# Patient Record
Sex: FEMALE | Race: WHITE | NOT HISPANIC OR LATINO | Employment: FULL TIME | URBAN - METROPOLITAN AREA
[De-identification: names, ages, dates, MRNs, and addresses within clinical notes are randomized per-mention and may not be internally consistent; named-entity substitution may affect disease eponyms.]

---

## 2017-07-05 ENCOUNTER — ALLSCRIPTS OFFICE VISIT (OUTPATIENT)
Dept: OTHER | Facility: OTHER | Age: 22
End: 2017-07-05

## 2017-07-05 DIAGNOSIS — Z34.81 ENCOUNTER FOR SUPERVISION OF OTHER NORMAL PREGNANCY, FIRST TRIMESTER: ICD-10-CM

## 2017-08-02 ENCOUNTER — ALLSCRIPTS OFFICE VISIT (OUTPATIENT)
Dept: OTHER | Facility: OTHER | Age: 22
End: 2017-08-02

## 2017-08-04 ENCOUNTER — GENERIC CONVERSION - ENCOUNTER (OUTPATIENT)
Dept: OTHER | Facility: OTHER | Age: 22
End: 2017-08-04

## 2017-08-04 ENCOUNTER — ALLSCRIPTS OFFICE VISIT (OUTPATIENT)
Dept: PERINATAL CARE | Facility: CLINIC | Age: 22
End: 2017-08-04
Payer: COMMERCIAL

## 2017-08-04 LAB
CHLAMYDIA, LIQUID-BASED (HISTORICAL): NOT DETECTED
GC BY MOL. METHOD (HISTORICAL): NOT DETECTED
PAP (HISTORICAL): NORMAL

## 2017-08-04 PROCEDURE — 76813 OB US NUCHAL MEAS 1 GEST: CPT | Performed by: OBSTETRICS & GYNECOLOGY

## 2017-08-09 ENCOUNTER — LAB CONVERSION - ENCOUNTER (OUTPATIENT)
Dept: OTHER | Facility: OTHER | Age: 22
End: 2017-08-09

## 2017-08-09 LAB
AGE RISK DOWN SYNDROME (HISTORICAL): NORMAL
CALC'D GESTATIONAL AGE (HISTORICAL): 13.4
COLLECTION DATE (HISTORICAL): NORMAL
CROWN RUMP LENGTH (HISTORICAL): 76 MM
CROWN RUMP LENGTH (HISTORICAL): NORMAL MM
DATE OF BIRTH (HISTORICAL): NORMAL
DONOR AGE; EGG RETRIEVAL (HISTORICAL): NORMAL
DONOR EGG (HISTORICAL): NO
EDD DETERMINED BY (HISTORICAL): NORMAL
ESTIMATED DELIVERY DATE (EDD) (HISTORICAL): NORMAL
HCG MOM (HISTORICAL): 0.45
HCG QUANTITATIVE (HISTORICAL): 35.4 IU/ML
HX OF NEURAL TUBE DEFECTS (HISTORICAL): NO
IF TWINS (HISTORICAL): NORMAL
INSULIN DEP. DIABETIC (HISTORICAL): NO
INTERPRETATION (HISTORICAL): NORMAL
MATERNAL WEIGHT (HISTORICAL): 146 LBS
MSS DOWN SYNDROME RISK (HISTORICAL): NORMAL
MSS3 TRISOMY 18 RISK (HISTORICAL): NORMAL
NASAL BONE (HISTORICAL): NORMAL
NASAL BONE (HISTORICAL): PRESENT
NT MOM (HISTORICAL): 0.96
NTQR LOCATION ID (HISTORICAL): NORMAL
NTQR READING PHYS ID (HISTORICAL): NORMAL
NUCHAL TRANSLUCENCY (HISTORICAL): 1.6 MM
NUCHAL TRANSLUCENCY (HISTORICAL): NORMAL MM
NUMBER OF FETUSES (HISTORICAL): 1
PAPP-A (HISTORICAL): 0.84
PAPP-A (HISTORICAL): 998.5 NG/ML
PREV PREGNANCY DOWN SYND (HISTORICAL): NO
RACE/ETHNIC ORIGIN (HISTORICAL): NORMAL
REFERRING PHYSICIAN (HISTORICAL): NORMAL
REFERRING PHYSICIAN NPI (HISTORICAL): NORMAL
REFERRING PHYSICIAN PHONE (HISTORICAL): NORMAL
REPEAT SPECIMEN (HISTORICAL): NO
ULTRASONOGRAPHER ID (HISTORICAL): NORMAL
ULTRASOUND DATE (HISTORICAL): NORMAL

## 2017-08-10 ENCOUNTER — GENERIC CONVERSION - ENCOUNTER (OUTPATIENT)
Dept: OTHER | Facility: OTHER | Age: 22
End: 2017-08-10

## 2017-08-16 ENCOUNTER — GENERIC CONVERSION - ENCOUNTER (OUTPATIENT)
Dept: OTHER | Facility: OTHER | Age: 22
End: 2017-08-16

## 2017-08-30 ENCOUNTER — LAB CONVERSION - ENCOUNTER (OUTPATIENT)
Dept: OTHER | Facility: OTHER | Age: 22
End: 2017-08-30

## 2017-08-30 LAB
AFP (HISTORICAL): 1.16
AFP (HISTORICAL): 40.8 NG/ML
AGE RISK DOWN SYNDROME (HISTORICAL): NORMAL
CALC'D GESTATIONAL AGE (HISTORICAL): 16.4
COLLECTION DATE (HISTORICAL): NORMAL
CROWN RUMP LENGTH (HISTORICAL): 76 MM
DATE OF BIRTH (HISTORICAL): NORMAL
ESTIMATED DELIVERY DATE (EDD) (HISTORICAL): NORMAL
ESTRADIOL, FREE (HISTORICAL): 0.75 NG/ML
ESTRIOL MOM (HISTORICAL): 0.86
HCG MOM (HISTORICAL): 0.3
HCG QUANTITATIVE (HISTORICAL): 9.5 IU/ML
HX OF NEURAL TUBE DEFECTS (HISTORICAL): NO
INHIBIN A (HISTORICAL): 147 PG/ML
INHIBIN A MOM (HISTORICAL): 0.86
INSULIN DEP. DIABETIC (HISTORICAL): NO
INTERPRETATION (HISTORICAL): NORMAL
MATERNAL WEIGHT (HISTORICAL): 146 LBS
MSAFP RISK OPEN NTD (HISTORICAL): NORMAL
MSS DOWN SYNDROME RISK (HISTORICAL): NORMAL
MSS3 TRISOMY 18 RISK (HISTORICAL): NORMAL
NASAL BONE (HISTORICAL): NORMAL
NASAL BONE (HISTORICAL): PRESENT
NT MOM (HISTORICAL): 0.96
NUCHAL TRANSLUCENCY (HISTORICAL): 1.6 MM
NUMBER OF FETUSES (HISTORICAL): 1
PAPP-A (HISTORICAL): 0.84
PAPP-A (HISTORICAL): 998.5 NG/ML
RACE/ETHNIC ORIGIN (HISTORICAL): NORMAL
REFERRING PHYSICIAN (HISTORICAL): NORMAL
REFERRING PHYSICIAN NPI (HISTORICAL): NORMAL
REFERRING PHYSICIAN PHONE (HISTORICAL): NORMAL
REPEAT SPECIMEN (HISTORICAL): NO
SPECIMEN: (HISTORICAL): NORMAL
ULTRASOUND DATE (HISTORICAL): NORMAL

## 2017-08-31 ENCOUNTER — GENERIC CONVERSION - ENCOUNTER (OUTPATIENT)
Dept: OTHER | Facility: OTHER | Age: 22
End: 2017-08-31

## 2017-09-05 ENCOUNTER — GENERIC CONVERSION - ENCOUNTER (OUTPATIENT)
Dept: OTHER | Facility: OTHER | Age: 22
End: 2017-09-05

## 2017-09-18 ENCOUNTER — ALLSCRIPTS OFFICE VISIT (OUTPATIENT)
Dept: OTHER | Facility: OTHER | Age: 22
End: 2017-09-18

## 2017-09-20 ENCOUNTER — ALLSCRIPTS OFFICE VISIT (OUTPATIENT)
Dept: PERINATAL CARE | Facility: CLINIC | Age: 22
End: 2017-09-20
Payer: COMMERCIAL

## 2017-09-20 ENCOUNTER — GENERIC CONVERSION - ENCOUNTER (OUTPATIENT)
Dept: OTHER | Facility: OTHER | Age: 22
End: 2017-09-20

## 2017-09-20 PROCEDURE — 76817 TRANSVAGINAL US OBSTETRIC: CPT | Performed by: OBSTETRICS & GYNECOLOGY

## 2017-09-20 PROCEDURE — 76805 OB US >/= 14 WKS SNGL FETUS: CPT | Performed by: OBSTETRICS & GYNECOLOGY

## 2017-09-21 LAB — CULT RSLT GENITAL (HISTORICAL): ABNORMAL

## 2017-09-22 ENCOUNTER — HOSPITAL ENCOUNTER (OUTPATIENT)
Dept: RADIOLOGY | Age: 22
Discharge: HOME | End: 2017-09-22
Payer: COMMERCIAL

## 2017-09-22 ENCOUNTER — GENERIC CONVERSION - ENCOUNTER (OUTPATIENT)
Dept: OTHER | Facility: OTHER | Age: 22
End: 2017-09-22

## 2017-09-22 ENCOUNTER — ALLSCRIPTS OFFICE VISIT (OUTPATIENT)
Dept: OTHER | Facility: OTHER | Age: 22
End: 2017-09-22

## 2017-09-22 DIAGNOSIS — Z34.90 ENCOUNTER FOR SUPERVISION OF NORMAL PREGNANCY: ICD-10-CM

## 2017-09-22 DIAGNOSIS — R10.11 RIGHT UPPER QUADRANT PAIN: ICD-10-CM

## 2017-09-22 PROCEDURE — 76705 ECHO EXAM OF ABDOMEN: CPT

## 2017-09-23 LAB
A/G RATIO (HISTORICAL): 1.3 (CALC) (ref 1–2.5)
ALBUMIN SERPL BCP-MCNC: 3.8 G/DL (ref 3.6–5.1)
ALP SERPL-CCNC: 70 U/L (ref 33–115)
ALT SERPL W P-5'-P-CCNC: 15 U/L (ref 6–29)
AST SERPL W P-5'-P-CCNC: 15 U/L (ref 10–30)
BASOPHILS # BLD AUTO: 0.2 %
BASOPHILS # BLD AUTO: 18 CELLS/UL (ref 0–200)
BILIRUB SERPL-MCNC: 0.2 MG/DL (ref 0.2–1.2)
BUN SERPL-MCNC: 8 MG/DL (ref 7–25)
BUN/CREA RATIO (HISTORICAL): NORMAL (CALC) (ref 6–22)
CALCIUM SERPL-MCNC: 9.2 MG/DL (ref 8.6–10.2)
CHLORIDE SERPL-SCNC: 103 MMOL/L (ref 98–110)
CO2 SERPL-SCNC: 26 MMOL/L (ref 20–31)
CREAT SERPL-MCNC: 0.59 MG/DL (ref 0.5–1.1)
DEPRECATED RDW RBC AUTO: 12.6 % (ref 11–15)
EGFR AFRICAN AMERICAN (HISTORICAL): 149 ML/MIN/1.73M2
EGFR-AMERICAN CALC (HISTORICAL): 128 ML/MIN/1.73M2
EOSINOPHIL # BLD AUTO: 0.9 %
EOSINOPHIL # BLD AUTO: 83 CELLS/UL (ref 15–500)
GAMMA GLOBULIN (HISTORICAL): 2.9 G/DL (CALC) (ref 1.9–3.7)
GLUCOSE (HISTORICAL): 74 MG/DL (ref 65–99)
HCT VFR BLD AUTO: 34.8 % (ref 35–45)
HGB BLD-MCNC: 11.5 G/DL (ref 11.7–15.5)
LYMPHOCYTES # BLD AUTO: 1619 CELLS/UL (ref 850–3900)
LYMPHOCYTES # BLD AUTO: 17.6 %
MCH RBC QN AUTO: 30.8 PG (ref 27–33)
MCHC RBC AUTO-ENTMCNC: 33 G/DL (ref 32–36)
MCV RBC AUTO: 93.3 FL (ref 80–100)
MONOCYTES # BLD AUTO: 644 CELLS/UL (ref 200–950)
MONOCYTES (HISTORICAL): 7 %
NEUTROPHILS # BLD AUTO: 6836 CELLS/UL (ref 1500–7800)
NEUTROPHILS # BLD AUTO: 74.3 %
PLATELET # BLD AUTO: 194 THOUSAND/UL (ref 140–400)
PMV BLD AUTO: 11.7 FL (ref 7.5–12.5)
POTASSIUM SERPL-SCNC: 4.2 MMOL/L (ref 3.5–5.3)
RBC # BLD AUTO: 3.73 MILLION/UL (ref 3.8–5.1)
SODIUM SERPL-SCNC: 136 MMOL/L (ref 135–146)
TOTAL PROTEIN (HISTORICAL): 6.7 G/DL (ref 6.1–8.1)
WBC # BLD AUTO: 9.2 THOUSAND/UL (ref 3.8–10.8)

## 2017-09-29 ENCOUNTER — ALLSCRIPTS OFFICE VISIT (OUTPATIENT)
Dept: OTHER | Facility: OTHER | Age: 22
End: 2017-09-29

## 2017-09-29 LAB
GLUCOSE (HISTORICAL): NORMAL
HGB UR QL STRIP.AUTO: NORMAL
KETONES UR STRIP-MCNC: NORMAL MG/DL
LEUKOCYTE ESTERASE UR QL STRIP: NORMAL
NITRITE UR QL STRIP: NORMAL
PROT UR STRIP-MCNC: NORMAL MG/DL

## 2017-10-02 LAB — CULTURE RESULT (HISTORICAL): ABNORMAL

## 2017-10-16 ENCOUNTER — ALLSCRIPTS OFFICE VISIT (OUTPATIENT)
Dept: OTHER | Facility: OTHER | Age: 22
End: 2017-10-16

## 2017-10-18 LAB
BACTERIA UR QL AUTO: ABNORMAL
BILIRUB UR QL STRIP: NEGATIVE
CHARACTER, URINE (HISTORICAL): ABNORMAL
COLOR UR: YELLOW
CRYSTAL TYPE (HISTORICAL): ABNORMAL PERHPF
CRYSTALS URNS QL MICRO: ABNORMAL
CULTURE RESULT (HISTORICAL): NO GROWTH
FINE GRAN CASTS URNS QL MICRO: ABNORMAL PERLPF (ref 0–1)
GLUCOSE UR STRIP-MCNC: NEGATIVE MG/DL
HGB UR QL STRIP.AUTO: NEGATIVE
HYALINE CASTS #/AREA URNS LPF: ABNORMAL PERLPF (ref 0–4)
KETONES UR STRIP-MCNC: NEGATIVE MG/DL
LEUKOCYTE ESTERASE UR QL STRIP: ABNORMAL
NITRITE UR QL STRIP: NEGATIVE
NON-SQ EPI CELLS URNS QL MICRO: ABNORMAL
PH UR STRIP.AUTO: 8 [PH] (ref 5–8)
PROT UR-MCNC: NEGATIVE G/DL
RBC CASTS URNS QL MICRO: ABNORMAL PERLPF (ref 0–1)
SP GR UR STRIP.AUTO: 1.02 (ref 1–1.03)
URINE RBC (HISTORICAL): ABNORMAL PERHPF
UROBILINOGEN UR QL STRIP.AUTO: 0.2 MG/DL (ref 0.2–1)
WBC #/AREA URNS AUTO: ABNORMAL PERHPF (ref 0–4)

## 2017-10-30 ENCOUNTER — GENERIC CONVERSION - ENCOUNTER (OUTPATIENT)
Dept: OTHER | Facility: OTHER | Age: 22
End: 2017-10-30

## 2017-10-30 DIAGNOSIS — Z34.90 ENCOUNTER FOR SUPERVISION OF NORMAL PREGNANCY: ICD-10-CM

## 2017-11-16 ENCOUNTER — TRANSCRIBE ORDERS (OUTPATIENT)
Dept: LAB | Facility: HOSPITAL | Age: 22
End: 2017-11-16

## 2017-11-16 ENCOUNTER — APPOINTMENT (OUTPATIENT)
Dept: LAB | Facility: HOSPITAL | Age: 22
End: 2017-11-16
Attending: OBSTETRICS & GYNECOLOGY
Payer: COMMERCIAL

## 2017-11-16 DIAGNOSIS — Z34.90 ENCOUNTER FOR SUPERVISION OF NORMAL PREGNANCY: ICD-10-CM

## 2017-11-16 LAB
BASOPHILS # BLD AUTO: 0.02 THOUSANDS/ΜL (ref 0–0.1)
BASOPHILS NFR BLD AUTO: 0 % (ref 0–1)
EOSINOPHIL # BLD AUTO: 0.06 THOUSAND/ΜL (ref 0–0.61)
EOSINOPHIL NFR BLD AUTO: 1 % (ref 0–6)
ERYTHROCYTE [DISTWIDTH] IN BLOOD BY AUTOMATED COUNT: 13.7 % (ref 11.6–15.1)
GLUCOSE 1H P 50 G GLC PO SERPL-MCNC: 84 MG/DL
HCT VFR BLD AUTO: 33.8 % (ref 34.8–46.1)
HGB BLD-MCNC: 11.3 G/DL (ref 11.5–15.4)
LYMPHOCYTES # BLD AUTO: 1.37 THOUSANDS/ΜL (ref 0.6–4.47)
LYMPHOCYTES NFR BLD AUTO: 16 % (ref 14–44)
MCH RBC QN AUTO: 31.6 PG (ref 26.8–34.3)
MCHC RBC AUTO-ENTMCNC: 33.4 G/DL (ref 31.4–37.4)
MCV RBC AUTO: 94 FL (ref 82–98)
MONOCYTES # BLD AUTO: 0.61 THOUSAND/ΜL (ref 0.17–1.22)
MONOCYTES NFR BLD AUTO: 7 % (ref 4–12)
NEUTROPHILS # BLD AUTO: 6.75 THOUSANDS/ΜL (ref 1.85–7.62)
NEUTS SEG NFR BLD AUTO: 76 % (ref 43–75)
NRBC BLD AUTO-RTO: 0 /100 WBCS
PLATELET # BLD AUTO: 147 THOUSANDS/UL (ref 149–390)
PMV BLD AUTO: 10.1 FL (ref 8.9–12.7)
RBC # BLD AUTO: 3.58 MILLION/UL (ref 3.81–5.12)
WBC # BLD AUTO: 8.86 THOUSAND/UL (ref 4.31–10.16)

## 2017-11-16 PROCEDURE — 82950 GLUCOSE TEST: CPT

## 2017-11-16 PROCEDURE — 36415 COLL VENOUS BLD VENIPUNCTURE: CPT

## 2017-11-16 PROCEDURE — 85025 COMPLETE CBC W/AUTO DIFF WBC: CPT

## 2017-11-24 ENCOUNTER — GENERIC CONVERSION - ENCOUNTER (OUTPATIENT)
Dept: OBGYN CLINIC | Facility: CLINIC | Age: 22
End: 2017-11-24

## 2017-12-14 ENCOUNTER — APPOINTMENT (OUTPATIENT)
Dept: PERINATAL CARE | Facility: CLINIC | Age: 22
End: 2017-12-14
Payer: COMMERCIAL

## 2017-12-14 ENCOUNTER — GENERIC CONVERSION - ENCOUNTER (OUTPATIENT)
Dept: OTHER | Facility: OTHER | Age: 22
End: 2017-12-14

## 2017-12-14 PROCEDURE — G0008 ADMIN INFLUENZA VIRUS VAC: HCPCS | Performed by: OBSTETRICS & GYNECOLOGY

## 2017-12-14 PROCEDURE — 76816 OB US FOLLOW-UP PER FETUS: CPT | Performed by: OBSTETRICS & GYNECOLOGY

## 2017-12-14 PROCEDURE — 90686 IIV4 VACC NO PRSV 0.5 ML IM: CPT

## 2018-01-02 ENCOUNTER — ALLSCRIPTS OFFICE VISIT (OUTPATIENT)
Dept: OTHER | Facility: OTHER | Age: 23
End: 2018-01-02

## 2018-01-02 LAB — EXTERNAL GROUP B STREP ANTIGEN: NEGATIVE

## 2018-01-05 LAB — CULTURE GENITAL-BSB ON (HISTORICAL): NEGATIVE

## 2018-01-10 ENCOUNTER — GENERIC CONVERSION - ENCOUNTER (OUTPATIENT)
Dept: OTHER | Facility: OTHER | Age: 23
End: 2018-01-10

## 2018-01-10 NOTE — PROGRESS NOTES
SEP 20 2017         RE: Justin Randhawa                             To: [facility]   MR#: [id number]                                    [address]   : APR Dózsa Marcia  48 , [facility]   ENC: [phone]                             Fax: [phone]   (Exam #: [id number])      The LMP of this 23year old,  G3, P1-0-1-1 patient was 2017, her   working YUE is 2018 and the current gestational age is 25 weeks 0   days by 1st Trimester Sono  A sonographic examination was performed on SEP   20 2017 using real time equipment  The ultrasound examination was   performed using abdominal & vaginal techniques  The patient has a BMI of   26 1  Her blood pressure today was 108/59  Earliest ultrasound found in her record: 17  9wks  18 YUE      Elizabeth Barrett has no complaints today  She reports fetal movements and denies   vaginal bleeding  Her recently completed Stepwise Sequential Screen   revealed Down syndrome trisomy 18, and ONTD risks each of less than   1:5,000  Evaluation of the individual analyte levels reveals no increased   risk for abnormal placental function        Cardiac motion was observed at 138 bpm       INDICATIONS      fetal anatomical survey      Exam Types      Transvaginal   LEVEL II      RESULTS      Fetus # 1 of 1   Vertex presentation   Fetal growth appeared normal   Placenta Location = Posterior   No placenta previa   Placenta Grade = I      MEASUREMENTS (* Included In Average GA)      AC              14 6 cm        19 weeks 4 days* (43%)   BPD              4 4 cm        19 weeks 2 days* (34%)   HC              17 7 cm        20 weeks 1 day * (51%)   Femur            3 3 cm        20 weeks 4 days* (51%)      NBL              5 8 mm      Humerus          3 0 cm        19 weeks 6 days  (50%)      Biorbit          3 1 cm        20 weeks 1 day      HC/AC           1 22   FL/AC           0 23   FL/BPD          0 75   EFW (Ac/Fl/Hc)   332 grams - 0 lbs 12 oz      THE AVERAGE GESTATIONAL AGE is 19 weeks 6 days +/- 10 days  AMNIOTIC FLUID         Largest Vertical Pocket = 3 7 cm   Amniotic Fluid: Normal      CERVICAL EVALUATION      The cervix appeared normal (Ultrasound Examination)  SUPINE      Cervical Length: 3 90 cm      OTHER TEST RESULTS           Funneling?: No             Dynamic Changes?: No        Resp  To TFP?: No                      Debris?: No      ANATOMY      Head                                    Normal   Face/Neck                               Normal   Th  Cav  Normal   Heart                                   Normal   Abd  Cav  Normal   Stomach                                 Normal   Right Kidney                            Normal   Left Kidney                             Normal   Bladder                                 Normal   Abd  Wall                               Normal   Spine                                   Normal   Extrems                                 Normal   Genitalia                               Normal   Placenta                                Normal   Umbl  Cord                              Normal   Uterus                                  Normal   PCI                                     Normal      ANATOMY DETAILS      Visualized Appearing Sonographically Normal:   HEAD: (Calvarium, BPD Level, Cavum, Lateral Ventricles, Choroid Plexus,   Cerebellum, Cisterna Magna);    FACE/NECK: (Neck, Nuchal Fold, Profile,   Orbits, Nose/Lips, Palate, Face);    TH  CAV  : (Lungs, Diaphragm); HEART: (Four Chamber View, Proximal Left Outflow, Proximal Right Outflow,   3VV, 3 Vessel Trachea, Short Axis of Greater Vessels, Ductal Arch, Aortic   Arch, Interventricular Septum, Interatrial Septum, IVC, SVC, Cardiac Axis,   Cardiac Position);    ABD  CAV : (Liver);    STOMACH, RIGHT KIDNEY, LEFT   KIDNEY, BLADDER, ABD   WALL, SPINE: (Cervical Spine, Thoracic Spine, Lumbar   Spine, Sacrum);    EXTREMS: (Lt Humerus, Rt Humerus, Lt Forearm, Rt   Forearm, Lt Hand, Rt Hand, Lt Femur, Rt Femur, Lt Low Leg, Rt Low Leg, Lt   Foot, Rt Foot);    GENITALIA (Female), PLACENTA, UMBL  CORD, UTERUS, PCI      ADNEXA      The left ovary appeared normal and measured 2 7 x 2 1 x 1 1 cm with a   volume of 3 3 cc  The right ovary appeared normal and measured 2 9 x 1 6 x   1 9 cm with a volume of 4 6 cc  IMPRESSION      Cruz IUP   19 weeks and 6 days by this ultrasound  (YUE=FEB 8 2018)   20 weeks and 0 days by 1st Tri Sono  (YUE=FEB 7 2018)   Vertex presentation   Fetal growth appeared normal   Normal anatomy survey   Regular fetal heart rate of 138 bpm   Posterior placenta   No placenta previa      GENERAL COMMENT      No fetal structural abnormality or ultrasound marker for aneuploidy is   identified on the Level II ultrasound study today  Fetal growth and   amniotic fluid volume are normal   The placenta is normal in appearance  The cervix is normal in appearance by transvaginal sonography  The   cervical length is normal   Cervical debris is not present  Cervical   funneling is not present  Neither provocative nor dynamic change is   appreciated  Today's ultrasound findings and suggested follow-up were discussed in   detail with [facility]  We discussed that prenatal ultrasound cannot rule out   all congenital abnormalities  Her Stepwise Sequential Screen results were   discussed in detail  No further appointment has been scheduled in the   [address] [place] [address] at this time  Follow-up [facility] ultrasound   evaluation is recommended as clinically indicated  The face to face time, in addition to time spent discussing ultrasound   results, was approximately 10 minutes, greater than 50% of which was spent   during counseling and coordination of care  JANIE Quinones M D     Maternal-Fetal Medicine   Electronically signed 09/20/17 12:52

## 2018-01-12 NOTE — RESULT NOTES
Verified Results  (Q) STEPWISE, PART 2 [id number] 02:37PM Matilde Trejo     Test Name Result Flag Reference   INTERPRETATION SEE NOTE     SCREEN NEGATIVE FOR OPEN NTD, DOWN SYNDROME AND TRISOMY 18   NT WAS USED IN THE RISK CALCULATIONS  RISK FOR ONTD <1:5000     AGE RISK DOWN SYNDROME 1:1100     GABRIELA DOWN SYNDROME RISK <1:5000  <1:270   RISK FOR TRISOMY 18 <1:5000  <1:100   CALCULATED GESTATIONAL$AGE 16 4     Crown rump length (CRL) was used to calculate gestational  age  YUE, if provided, was not used for gestational age  dating  AFP, SERUM 40 8 ng/mL     AFP MOM 1 16     Reference Range:                                        <2 50                                        IDD        <1 90                                        TWINS      <4 00                                        TWINS IDD  <3 50                                        TRIPLETS   <4 50   HCG, SERUM 9 5 IU/mL     HCG MOM 0 30     ESTRIOL, FREE 0 75 ng/mL     ESTRIOL MOM 0 86     INHIBIN A, DIMERIC 147 pg/mL     INHIBIN A MOM 0 86     JODI A 998 5 ng/mL     This test was performed using a kit that has not been  cleared or approved by the FDA  The analytical performance  characteristics of this test have been determined by [facility]  This test  should not be used for diagnosis without confirmation by  other medically established means  JODI-A MOM 0 84     NT MOM 0 96     The maternal serum screening results indicate a lower risk  of trisomy 21 in this pregnancy  The nasal bone was assessed  via ultrasound and was present  The combined risk is  therefore likely to be less than the calculated risk  Other  findings later in the pregnancy may change the risk  Nasal bone assessment is best accomplished through a fetal  ultrasound performed between 11 weeks 0 days through 13  weeks 6 days   In assessing the risk for aneuploidy, the  evaluation of the maternal serum markers plus the nuchal  thickness measurement is calculated first  Any potential  change to the patient's risk for aneuploidy depends on the  nuchal thickness, crown-rump length, and the ethnic origin,  and therefore the values generated by the algorithm itself  will not change  Additional information about the assessment  of the fetal nasal bone may be found on the [facility] website at  http://www  fetalmedicine com/fmf/training-certification/cert  jfpbknue-gf-wlnta  tence/11-13-week-scan/assessment-of-the-nasal-bone/  The Sequential Integrated Screen combines JODI-A and hCG  with or without a nuchal translucency measurement in the  first trimester with AFP, unconjugated estriol, intact hCG  and Inhibin A in the second trimester  This provides a  useful screening test for detection of open neural tube  defects, Down syndrome and Trisomy 18  It should be noted  that normal results can never guarantee the birth of a  normal baby and that 2 to 3 percent of newborns have some  type of physical or mental defect, many of which are  undetectable through any known prenatal diagnostic  technique  This is a screening test, not a diagnostic test      This risk assessment is based on demographic data provided  by the ordering physician  Please notify the laboratory  promptly if any data are incorrect  If you have questions concerning this report: For clinical consultation, call [phone]; For technical questions, call [phone] ext [phone]; For recalculations, fax to [phone]     REFERRING PHYSICIAN NAME [address] PHYSICIAN PHONE [phone]     REFERRING PHYSICIAN JOE [phone]     SPECIMEN # FROM PART 1 [id number]     DATE OF BIRTH 1995     COLLECTION DATE 08/25/2017     YUE: 02/07/2018     NUCHAL TRANSLUCENCY 1 6 mm     MOTHER'S ETHNIC ORIGIN      INSULIN DEPEND DIABETIC NO     REPEAT SPECIMEN NO     NUMBER OF FETUSES 1     HX OF NEURAL TUBE DEFECTS NO     MATERNAL WEIGHT 146 lbs     Crown Rump Length 76 mm     Ultrasound Date 08/04/2017     Nasal Bone PRESENT     Twin B Nasal Bone NOT GIVEN     For additional information, please refer to  http://Fiiiling/faq/FAQ74  (This link is provided for more informational/educational  purposes only )

## 2018-01-13 VITALS
DIASTOLIC BLOOD PRESSURE: 68 MMHG | HEIGHT: 64 IN | BODY MASS INDEX: 26.29 KG/M2 | SYSTOLIC BLOOD PRESSURE: 102 MMHG | WEIGHT: 154 LBS

## 2018-01-13 NOTE — PROGRESS NOTES
AUG 4 2017         RE: Marvin Salvage                             To: [facility]   MR#: [id number]                                    [address]   :  [facility], [address] [place] [address]   ENC: [id number]                             Fax: [phone]   (Exam #: [id number])      The LMP of this 23year old,  G3, P1-0-1-1 patient was 2017, her   working YUE is 2018 and the current gestational age is 17 weeks 2   days by 1st Trimester Sono  A sonographic examination was performed on AUG   4 2017 using real time equipment  The ultrasound examination was performed   using abdominal technique  The patient has a BMI of 25 1  Her blood   pressure today was 106/61  Earliest ultrasound found in her record: 17  9wks  18 YUE Multiple   longitudinal and transverse sections revealed a davis intrauterine   pregnancy  The placenta is posterior in implantation, grade 0 in   appearance  Cardiac motion was observed at 143 bpm       INDICATIONS      first trimester genetic screening      Exam Types      sequential screen      RESULTS      Fetus # 1 of 1   Fetal growth appeared normal      MEASUREMENTS (* Included In Average GA)      CRL              7 6 cm        13 weeks 3 days*   Nuchal Trans    1 60 mm      THE AVERAGE GESTATIONAL AGE is 13 weeks 3 days +/- 7 days  ANATOMY COMMENTS      Anatomic detail is limited at this gestational age  The yolk sac was not   noted  The fetal cranium appeared normal in shape and the nuchal   translucency was normal in size (1 6mm)  The nasal bone appears to be   present  The intracranial anatomy was unremarkable  Evaluation of the   spine revealed no obvious evidence for a neural tube defect  Anatomy of   the fetal thorax appeared within normal limits  The cardiac rhythm was   regular  Within the abdomen, stomach & bladder were visualized and the   abdominal wall appeared intact   A three vessel cord appears to be present  Active movement of the fetal body & extremities was seen  There is no   suspicion of a subchorionic bleed  The placental cord insertion was   normal    There is no suspicion of a uterine myoma  Free fluid is not seen   in the posterior cul-de-sac  ADNEXA      The left ovary appeared normal and measured 2 8 x 2 7 x 1 1 cm with a   volume of 4 3 cc  The right ovary appeared normal and measured 3 0 x 2 7 x   1 5 cm with a volume of 6 4 cc  AMNIOTIC FLUID         Largest Vertical Pocket = 3 1 cm   Amniotic Fluid: Normal      IMPRESSION      Cruz IUP   13 weeks and 3 days by this ultrasound  (YUE=FEB 6 2018)   13 weeks and 2 days by 1st Tri Sono  (YUE=FEB 7 2018)   Fetal growth appeared normal   Fetal heart rate of 143 bpm   Posterior placenta      CONSULT COMMENT      Thank you very much for requesting consultation on this very nice patient   for the indication of genetic screening  This is the patient's third   pregnancy  She has a history of one termination of pregnancy and one   full-term vaginal delivery without complications in 2694  She has no   other significant medical or surgical history  She denies the current use   of tobacco, alcohol, or drugs  She currently takes vitamins and has no   significant drug allergies  Her family medical history is significant for   a maternal aunt and cousin with colon cancer and breast cancer  No other   family members appear to have significant congenital birth defects  A   review of systems is otherwise negative  On exam, the patient appears   well, in no acute distress, and her abdomen is nontender  We discussed the options for genetic screening, including but not limited   to first trimester screening, second trimester screening, combined first   and second trimester screening, noninvasive prenatal testing (NIPT) for   patients at high risk and diagnostic screening through the use of CVS and   amniocentesis  We discussed the risks and benefits of each approach   including the sensitivities and false positive rates as well as the   difference between a screening test and a diagnostic test   At the   conclusion of our discussion the patient elected Stepwise Sequential   Screening to delineate her risk for fetal aneuploidy  She was given a   requisition to go to [facility] to have the first trimester blood work drawn  The first trimester portion of the screening results, encompassing age,   nuchal translucency, and biochemistry should be available within one week   of testing and will be reported from [facility]   The second stage of   sequential screening should be completed between the 15th and 21st week of   pregnancy (ideally between 16-18 weeks)  We discussed follow-up in detail and I recommend an anatomy ultrasound be   scheduled for 20 weeks gestation  Thank you very much for allowing us to participate in the care of this   very nice patient  Should you have any questions, please do not hesitate   to contact our office  Please note, in addition to the time spent discussing the results of the   ultrasound, I spent approximately 15 minutes of face-to-face time with the   patient, greater than 50% of which was spent in counseling and the   coordination of care for this patient  Portions of the record may have been created with voice recognition   software  Occasional wrong word or "sound a like" substitutions may have   occurred due to the inherent limitations of voice recognition software  Read the chart carefully and recognize, using context, where substitutions   have occurred  JANIE Rhoades M D     Electronically signed 08/04/17 09:30

## 2018-01-14 VITALS
DIASTOLIC BLOOD PRESSURE: 60 MMHG | BODY MASS INDEX: 25.87 KG/M2 | HEIGHT: 63 IN | SYSTOLIC BLOOD PRESSURE: 102 MMHG | WEIGHT: 146 LBS

## 2018-01-14 VITALS
SYSTOLIC BLOOD PRESSURE: 108 MMHG | BODY MASS INDEX: 25.95 KG/M2 | HEIGHT: 64 IN | DIASTOLIC BLOOD PRESSURE: 59 MMHG | WEIGHT: 152 LBS

## 2018-01-14 VITALS — DIASTOLIC BLOOD PRESSURE: 70 MMHG | BODY MASS INDEX: 27.1 KG/M2 | WEIGHT: 153 LBS | SYSTOLIC BLOOD PRESSURE: 120 MMHG

## 2018-01-14 VITALS
HEIGHT: 64 IN | BODY MASS INDEX: 24.96 KG/M2 | SYSTOLIC BLOOD PRESSURE: 106 MMHG | DIASTOLIC BLOOD PRESSURE: 61 MMHG | WEIGHT: 146.2 LBS

## 2018-01-15 NOTE — MISCELLANEOUS
Message  Demetrius Dc notified part 1 stepwise results are within normal limits   instructions given for part 2 blood draw   TRF mailed      Active Problems    1  Anxiety (300 00) (F41 9)   2  Encounter for supervision of other normal pregnancy in first trimester (V22 1) (Z34 81)   3  Finger injury (959 5) (S69 90XA)   4  Need for vaccination for DTP (V06 1) (Z23)   5  Normal pregnancy (V22 2) (Z34 90)    Current Meds   1  Select-OB+DHA 29-1 & 250 MG Oral Miscellaneous; 1 tablet daily; Therapy: 65Hyd1810 to (Evaluate:41Dwv4278)  Requested for: 07Tqd8723; Last   Rx:[id number] Ordered   2  Vitamin D3 19839 UNIT Oral Capsule; Therapy: (Recorded:59Sle8986) to Recorded    Allergies    1  No Known Drug Allergies    2  No Known Environmental Allergies   3   No Known Food Allergies    Signatures   Electronically signed by : Naima Silver, ; Aug 16 2017  9:01AM EST                       (Author)

## 2018-01-16 NOTE — RESULT NOTES
Verified Results  (Q) STEPWISE, PART 1 [id number] 10:50AM Yashira Johnson     Test Name Result Flag Reference   INTERPRETATION SEE NOTE     This patient's risk does not exceed the first trimester  cut-off for Down syndrome or trisomy 18  The integrated  screen calculation is awaiting the second trimester sample  NT WAS USED IN THE RISK CALCULATIONS  Thank you for submitting this patient's Part 1 specimen  These first trimester values will be incorporated with the  second trimester values as part of the integrated testing  process  Please submit the Part 2 specimen between   08/15/2017-10/09/2017 (15 0 and 22 9 weeks gestation) with   08/15/2017-08/28/2017 (15 0 - 16 9 weeks gestation) being  optimal  When submitting Part 2, please include the  following Specimen # from Part 1:  [id number]   AGE RISK DOWN SYNDROME 1:780     GABRIELA DOWN SYNDROME RISK <1:5000  <1:50   RISK FOR TRISOMY 18 <1:5000  <1:100   CALCULATED GESTATIONAL$AGE 13 4     Crown rump length (CRL) was used to calculate gestational  age  YUE, if provided, was not used for gestational age  dating  JODI-A 998 5 ng/mL     This test was performed using a kit that has not been  cleared or approved by the FDA  The analytical performance  characteristics of this test have been determined by [facility]  This test  should not be used for diagnosis without confirmation by  other medically established means  JODI-A MOM 0 84     HCG 35 4 IU/mL     HCG MOM 0 45     NT MOM 0 96     The maternal serum screening results indicate a lower risk  of trisomy 21 in this pregnancy  The nasal bone was assessed  via ultrasound and was present  The combined risk is  therefore likely to be less than the calculated risk  Other  findings later in the pregnancy may change the risk  Nasal bone assessment is best accomplished through a fetal  ultrasound performed between 11 weeks 0 days through 13  weeks 6 days   In assessing the risk for aneuploidy, the  evaluation of the maternal serum markers plus the nuchal  thickness measurement is calculated first  Any potential  change to the patient's risk for aneuploidy depends on the  nuchal thickness, crown-rump length, and the ethnic origin,  and therefore the values generated by the algorithm itself  will not change  Additional information about the assessment  of the fetal nasal bone may be found on the [facility] website at  http://www  fetalmedicine com/fmf/training-certification/cert  mdfeywzg-yz-udbqu  tence/11-13-week-scan/assessment-of-the-nasal-bone/  Please note that the Sequential Integrated maternal serum  screen for Down syndrome risk assessment was designed by Dr Jaxon Quevedo ([address], et al J Med Screen 2003 v10 p56-104)  to provide a high detection rate and low false positive rate  when a cutoff of 1:50 is used to identify high risk  pregnancies during the first trimester  Use of any other  cutoff for determination of risk in the first trimester will  result in a higher false positive rate for the two-part  screen  All patients whose risk is lower than 1:50 should  have a second sample submitted to complete the screen  This is a screening test, not a diagnostic test      This risk assessment is based on demographic data provided  by the ordering physician  Please notify the laboratory  promptly if any data are incorrect  If you have questions concerning this report: For clinical consultation, call [phone]; For technical questions, call [phone] ext [phone]; For recalculations, fax to [phone]  For additional information, please refer to  http://[employer]/faq/FAQ85  (This link is provided for informational/educational  purposes only )   REFERRING PHYSICIAN NAME [address] [place] [address] PHONE [phone] [address] [facility] [phone]     DATE OF BIRTH 1995     COLLECTION DATE 08/04/2017     ULTRASOUND DATE 08/04/2017     ULTRASONOGRAPHER'S NAME AKHIL YARBROUGH     NTQR ULTRASONOGRAPHER ID# [id number]     NTQR LOCATION ID# NOT GIVEN     NTQR READING PHYS ID# [id number]     [facility] ULTRASONOGRAPHER ID# NOT GIVEN     CROWN RUMP LENGTH 76 mm     NUCHAL TRANSLUCENCY 1 6 mm     IF TWINS NOT GIVEN     TWIN B CRL NOT GIVEN mm     TWIN B NT NOT GIVEN mm     MATERNAL WEIGHT 146 lbs     EST'D DATE OF DELIVERY 02/07/2018     YUE DETERMINED BY ULTRASOUND     MOTHER'S ETHNIC ORIGIN      NUMBER OF FETUSES 1     INSULIN DEPEND DIABETIC NO     REPEAT SPECIMEN NO     HX OF NEURAL TUBE DEFECTS NO     PREV PREG DOWN SYND NO     DONOR EGG NO     DONOR EGG; EGG RETRIEVAL NOT GIVEN     Nasal Bone PRESENT     Twin B Nasal Bone NOT GIVEN

## 2018-01-19 ENCOUNTER — GENERIC CONVERSION - ENCOUNTER (OUTPATIENT)
Dept: OTHER | Facility: OTHER | Age: 23
End: 2018-01-19

## 2018-01-20 ENCOUNTER — OB ABSTRACT (OUTPATIENT)
Dept: OBGYN CLINIC | Facility: CLINIC | Age: 23
End: 2018-01-20

## 2018-01-22 VITALS
HEIGHT: 63 IN | DIASTOLIC BLOOD PRESSURE: 62 MMHG | BODY MASS INDEX: 26.05 KG/M2 | WEIGHT: 147 LBS | SYSTOLIC BLOOD PRESSURE: 108 MMHG

## 2018-01-22 VITALS
SYSTOLIC BLOOD PRESSURE: 120 MMHG | WEIGHT: 161 LBS | BODY MASS INDEX: 27.49 KG/M2 | HEIGHT: 64 IN | DIASTOLIC BLOOD PRESSURE: 72 MMHG

## 2018-01-22 VITALS — DIASTOLIC BLOOD PRESSURE: 70 MMHG | BODY MASS INDEX: 26.95 KG/M2 | SYSTOLIC BLOOD PRESSURE: 120 MMHG | WEIGHT: 157 LBS

## 2018-01-22 VITALS — WEIGHT: 152 LBS | DIASTOLIC BLOOD PRESSURE: 66 MMHG | SYSTOLIC BLOOD PRESSURE: 110 MMHG | BODY MASS INDEX: 26.09 KG/M2

## 2018-01-22 VITALS
BODY MASS INDEX: 25.78 KG/M2 | WEIGHT: 151 LBS | SYSTOLIC BLOOD PRESSURE: 100 MMHG | DIASTOLIC BLOOD PRESSURE: 58 MMHG | HEIGHT: 64 IN

## 2018-01-22 VITALS
SYSTOLIC BLOOD PRESSURE: 108 MMHG | HEIGHT: 64 IN | DIASTOLIC BLOOD PRESSURE: 68 MMHG | WEIGHT: 162 LBS | BODY MASS INDEX: 27.66 KG/M2

## 2018-01-23 VITALS — SYSTOLIC BLOOD PRESSURE: 102 MMHG | BODY MASS INDEX: 27.64 KG/M2 | DIASTOLIC BLOOD PRESSURE: 60 MMHG | WEIGHT: 161 LBS

## 2018-01-23 NOTE — PROGRESS NOTES
DEC 14 2017         RE: Eder Mckeon                             To: [facility]   MR#: [id number]                                    [address]   : APR Dózsa Marcia  48 , [address] [facility]   ENC: [id number]                             Fax: [phone]   (Exam #: [id number])      The LMP of this 23year old,  G3, P1-0-1-1 patient was 2017, her   working YUE is 2018 and the current gestational age is 26 weeks 1   day by 1st Trimester Sono  A sonographic examination was performed on DEC   14 2017 using real time equipment  The ultrasound examination was   performed using abdominal technique  The patient has a BMI of 27 3  Her   blood pressure today was 108/60  Earliest ultrasound found in her record: 17  9wks  18 YUE      Cardiac motion was observed at 123 bpm       INDICATIONS      fetal growth      Exam Types      Level I      RESULTS      Fetus # 1 of 1   Vertex presentation   Fetal growth appeared normal   Placenta Location = Posterior   No placenta previa   Placenta Grade = II      MEASUREMENTS (* Included In Average GA)      AC              27 6 cm        31 weeks 5 days* (38%)   BPD              7 9 cm        31 weeks 6 days* (33%)   HC              28 5 cm        30 weeks 6 days* (9%)   Femur            6 0 cm        31 weeks 2 days* (33%)      Cerebellum       4 0 cm        33 weeks 5 days      HC/AC           1 03   FL/AC           0 22   FL/BPD          0 76   EFW (Ac/Fl/Hc)  1774 grams - 3 lbs 15 oz                 (32%)      THE AVERAGE GESTATIONAL AGE is 31 weeks 4 days +/- 21 days  AMNIOTIC FLUID      Q1: 2 5      Q2: 3 5      Q3: 4 4      Q4: 3 0   JANINE Total = 13 4 cm   Amniotic Fluid: Normal      ANATOMY COMMENTS      Fetal anatomy has been previously assessed and documented in our Center   and no anomalies were identified  No fetal structural abnormality is   identified on the Level I survey today   Follow up intracranial anatomy   (calvarium, cavum, lateral ventricles, and cerebellum), cardiac anatomy (,   LVOT, RVOT), diaphragm, stomach, kidneys, and bladder appear normal       IMPRESSION      Cruz IUP   31 weeks and 4 days by this ultrasound  (YUE=FEB 11 2018)   32 weeks and 1 day by 1st Tri Sono  (YUE=FEB 7 2018)   Vertex presentation   Fetal growth appeared normal   Regular fetal heart rate of 123 bpm   Posterior placenta   No placenta previa      GENERAL COMMENT      Ms Lunsford is here for growth  There is a single live intrauterine pregnancy with growth at the 32nd   percentile  No gross anomalies were identified on limited views  Amniotic fluid is within normal limits  Evaluation and Management:   The patient was counseled regarding the above findings  A total of 10   minutes were spent in this encounter with >50% of the time spent in   face-to-face counseling and in coordination of care  The limitations of   ultrasound were reviewed  She can continue pregnancy with expectant management without further   ultrasounds unless an additional indication or concern arises  I strongly recommended consideration of influenza vaccination in pregnancy   and this was administered prior to her departure today  At the conclusion of today's encounter, all questions were answered to her   satisfaction  Thank you very much for this kind referral and please do   not hesitate to contact me with any further questions or concerns  JANIE Ordaz M D     Maternal Fetal Medicine   Electronically signed 12/14/17 12:29

## 2018-01-24 VITALS
HEIGHT: 64 IN | WEIGHT: 164 LBS | SYSTOLIC BLOOD PRESSURE: 102 MMHG | BODY MASS INDEX: 28 KG/M2 | DIASTOLIC BLOOD PRESSURE: 64 MMHG

## 2018-01-24 VITALS
BODY MASS INDEX: 28.34 KG/M2 | WEIGHT: 166 LBS | HEIGHT: 64 IN | DIASTOLIC BLOOD PRESSURE: 62 MMHG | SYSTOLIC BLOOD PRESSURE: 110 MMHG

## 2018-01-24 VITALS
BODY MASS INDEX: 27.31 KG/M2 | DIASTOLIC BLOOD PRESSURE: 62 MMHG | SYSTOLIC BLOOD PRESSURE: 100 MMHG | WEIGHT: 160 LBS | HEIGHT: 64 IN

## 2018-01-24 VITALS
WEIGHT: 159 LBS | SYSTOLIC BLOOD PRESSURE: 108 MMHG | HEIGHT: 64 IN | DIASTOLIC BLOOD PRESSURE: 60 MMHG | BODY MASS INDEX: 27.14 KG/M2

## 2018-01-26 ENCOUNTER — ROUTINE PRENATAL (OUTPATIENT)
Dept: OBGYN CLINIC | Facility: CLINIC | Age: 23
End: 2018-01-26

## 2018-01-26 VITALS — BODY MASS INDEX: 28.32 KG/M2 | SYSTOLIC BLOOD PRESSURE: 118 MMHG | DIASTOLIC BLOOD PRESSURE: 64 MMHG | WEIGHT: 165 LBS

## 2018-01-26 DIAGNOSIS — Z3A.38 38 WEEKS GESTATION OF PREGNANCY: Primary | ICD-10-CM

## 2018-01-26 PROCEDURE — PNV: Performed by: OBSTETRICS & GYNECOLOGY

## 2018-01-26 RX ORDER — ASCORBIC ACID, CHOLECALCIFEROL, .ALPHA.-TOCOPHEROL ACETATE, DL-, PYRIDOXINE, FOLIC ACID, CYANOCOBALAMIN, CALCIUM, FERROUS FUMARATE, MAGNESIUM, DOCONEXENT 85; 200; 10; 25; 1; 12; 140; 27; 45; 300 [IU]/1; [IU]/1; [IU]/1; [IU]/1; MG/1; UG/1; MG/1; MG/1; MG/1; MG/1
1 CAPSULE, GELATIN COATED ORAL DAILY
Refills: 11 | COMMUNITY
Start: 2017-12-31 | End: 2019-04-02 | Stop reason: ALTCHOICE

## 2018-01-26 NOTE — PROGRESS NOTES
Patient reports mild nausea and cramping, some change in quality of movement but overall good movement good fm, no v, headache, bleeding, loss of fluid, edema, dom violence, or smoking    gaudencio pnv

## 2018-02-02 ENCOUNTER — ROUTINE PRENATAL (OUTPATIENT)
Dept: OBGYN CLINIC | Facility: CLINIC | Age: 23
End: 2018-02-02

## 2018-02-02 VITALS — BODY MASS INDEX: 28.67 KG/M2 | WEIGHT: 167 LBS | DIASTOLIC BLOOD PRESSURE: 68 MMHG | SYSTOLIC BLOOD PRESSURE: 100 MMHG

## 2018-02-02 DIAGNOSIS — Z3A.38 38 WEEKS GESTATION OF PREGNANCY: ICD-10-CM

## 2018-02-02 PROCEDURE — PNV: Performed by: OBSTETRICS & GYNECOLOGY

## 2018-02-08 ENCOUNTER — ROUTINE PRENATAL (OUTPATIENT)
Dept: OBGYN CLINIC | Facility: CLINIC | Age: 23
End: 2018-02-08

## 2018-02-08 VITALS — DIASTOLIC BLOOD PRESSURE: 74 MMHG | SYSTOLIC BLOOD PRESSURE: 116 MMHG | BODY MASS INDEX: 28.67 KG/M2 | WEIGHT: 167 LBS

## 2018-02-08 DIAGNOSIS — Z34.83 PRENATAL CARE, SUBSEQUENT PREGNANCY, THIRD TRIMESTER: Primary | ICD-10-CM

## 2018-02-08 PROBLEM — B37.31 YEAST VAGINITIS: Status: ACTIVE | Noted: 2017-09-18

## 2018-02-08 PROBLEM — B37.3 YEAST VAGINITIS: Status: ACTIVE | Noted: 2017-09-18

## 2018-02-08 PROCEDURE — PNV: Performed by: NURSE PRACTITIONER

## 2018-02-08 NOTE — PROGRESS NOTES
OFFICE VISIT: Pt reports mild cramping and headaches  Denies LOF, VB,  N/V, Edema, Domestic Violence, Smoking, + FM  Tolerating PNV  Appointment for IOL scheduled for Thursday Feb 15th at  [facility]  m , pt aware of date and time  Pt to report to [facility]  GBS negative  Reviewed labor precautions

## 2018-02-09 ENCOUNTER — TELEPHONE (OUTPATIENT)
Dept: OBGYN CLINIC | Facility: CLINIC | Age: 23
End: 2018-02-09

## 2018-02-10 ENCOUNTER — ANESTHESIA EVENT (INPATIENT)
Dept: LABOR AND DELIVERY | Facility: HOSPITAL | Age: 23
End: 2018-02-10
Payer: COMMERCIAL

## 2018-02-10 ENCOUNTER — ANESTHESIA (INPATIENT)
Dept: LABOR AND DELIVERY | Facility: HOSPITAL | Age: 23
End: 2018-02-10
Payer: COMMERCIAL

## 2018-02-10 ENCOUNTER — HOSPITAL ENCOUNTER (INPATIENT)
Facility: HOSPITAL | Age: 23
LOS: 2 days | Discharge: HOME | End: 2018-02-12
Attending: OBSTETRICS & GYNECOLOGY | Admitting: OBSTETRICS & GYNECOLOGY
Payer: COMMERCIAL

## 2018-02-10 DIAGNOSIS — Z3A.40 40 WEEKS GESTATION OF PREGNANCY: ICD-10-CM

## 2018-02-10 DIAGNOSIS — O47.1 FALSE LABOR AFTER 37 WEEKS OF GESTATION WITHOUT DELIVERY: ICD-10-CM

## 2018-02-10 LAB
ABO GROUP BLD: NORMAL
BASE EXCESS BLDCOA CALC-SCNC: -0.1 MMOL/L (ref 3–11)
BASE EXCESS BLDCOV CALC-SCNC: -1.2 MMOL/L (ref 1–9)
BASOPHILS # BLD AUTO: 0.01 THOUSANDS/ΜL (ref 0–0.1)
BASOPHILS NFR BLD AUTO: 0 % (ref 0–1)
BLD GP AB SCN SERPL QL: NEGATIVE
EOSINOPHIL # BLD AUTO: 0.07 THOUSAND/ΜL (ref 0–0.61)
EOSINOPHIL NFR BLD AUTO: 1 % (ref 0–6)
ERYTHROCYTE [DISTWIDTH] IN BLOOD BY AUTOMATED COUNT: 14.1 % (ref 11.6–15.1)
HCO3 BLDCOA-SCNC: 25.4 MMOL/L (ref 17.3–27.3)
HCO3 BLDCOV-SCNC: 23.9 MMOL/L (ref 12.2–28.6)
HCT VFR BLD AUTO: 35.9 % (ref 34.8–46.1)
HGB BLD-MCNC: 12.3 G/DL (ref 11.5–15.4)
LYMPHOCYTES # BLD AUTO: 1.85 THOUSANDS/ΜL (ref 0.6–4.47)
LYMPHOCYTES NFR BLD AUTO: 17 % (ref 14–44)
MCH RBC QN AUTO: 31.9 PG (ref 26.8–34.3)
MCHC RBC AUTO-ENTMCNC: 34.3 G/DL (ref 31.4–37.4)
MCV RBC AUTO: 93 FL (ref 82–98)
MONOCYTES # BLD AUTO: 0.69 THOUSAND/ΜL (ref 0.17–1.22)
MONOCYTES NFR BLD AUTO: 6 % (ref 4–12)
NEUTROPHILS # BLD AUTO: 8.37 THOUSANDS/ΜL (ref 1.85–7.62)
NEUTS SEG NFR BLD AUTO: 76 % (ref 43–75)
NRBC BLD AUTO-RTO: 0 /100 WBCS
O2 CT VFR BLDCOA CALC: 14.4 ML/DL
OXYHGB MFR BLDCOA: 58.7 %
OXYHGB MFR BLDCOV: 59.4 %
PCO2 BLDCOA: 44.3 MM[HG] (ref 30–60)
PCO2 BLDCOV: 41.5 MM HG (ref 27–43)
PH BLDCOA: 7.38 [PH] (ref 7.23–7.43)
PH BLDCOV: 7.38 [PH] (ref 7.19–7.49)
PLATELET # BLD AUTO: 125 THOUSANDS/UL (ref 149–390)
PMV BLD AUTO: 10.3 FL (ref 8.9–12.7)
PO2 BLDCOA: 23.7 MM HG (ref 5–25)
PO2 BLDCOV: 24.9 MM HG (ref 15–45)
RBC # BLD AUTO: 3.86 MILLION/UL (ref 3.81–5.12)
RH BLD: POSITIVE
SAO2 % BLDCOV: 14.3 ML/DL
SPECIMEN EXPIRATION DATE: NORMAL
WBC # BLD AUTO: 11.03 THOUSAND/UL (ref 4.31–10.16)

## 2018-02-10 PROCEDURE — 86900 BLOOD TYPING SEROLOGIC ABO: CPT | Performed by: OBSTETRICS & GYNECOLOGY

## 2018-02-10 PROCEDURE — 86592 SYPHILIS TEST NON-TREP QUAL: CPT | Performed by: OBSTETRICS & GYNECOLOGY

## 2018-02-10 PROCEDURE — 99204 OFFICE O/P NEW MOD 45 MIN: CPT

## 2018-02-10 PROCEDURE — 59400 OBSTETRICAL CARE: CPT | Performed by: OBSTETRICS & GYNECOLOGY

## 2018-02-10 PROCEDURE — 82805 BLOOD GASES W/O2 SATURATION: CPT | Performed by: OBSTETRICS & GYNECOLOGY

## 2018-02-10 PROCEDURE — 86850 RBC ANTIBODY SCREEN: CPT | Performed by: OBSTETRICS & GYNECOLOGY

## 2018-02-10 PROCEDURE — 86901 BLOOD TYPING SEROLOGIC RH(D): CPT | Performed by: OBSTETRICS & GYNECOLOGY

## 2018-02-10 PROCEDURE — G0463 HOSPITAL OUTPT CLINIC VISIT: HCPCS

## 2018-02-10 PROCEDURE — 85025 COMPLETE CBC W/AUTO DIFF WBC: CPT | Performed by: OBSTETRICS & GYNECOLOGY

## 2018-02-10 RX ORDER — DOCUSATE SODIUM 100 MG/1
100 CAPSULE, LIQUID FILLED ORAL 2 TIMES DAILY PRN
Status: DISCONTINUED | OUTPATIENT
Start: 2018-02-10 | End: 2018-02-12 | Stop reason: HOSPADM

## 2018-02-10 RX ORDER — BUPIVACAINE HYDROCHLORIDE 2.5 MG/ML
INJECTION, SOLUTION INFILTRATION; PERINEURAL AS NEEDED
Status: DISCONTINUED | OUTPATIENT
Start: 2018-02-10 | End: 2018-02-10 | Stop reason: SURG

## 2018-02-10 RX ORDER — ONDANSETRON 2 MG/ML
4 INJECTION INTRAMUSCULAR; INTRAVENOUS EVERY 6 HOURS PRN
Status: DISCONTINUED | OUTPATIENT
Start: 2018-02-10 | End: 2018-02-10

## 2018-02-10 RX ORDER — FENTANYL CITRATE 50 UG/ML
INJECTION, SOLUTION INTRAMUSCULAR; INTRAVENOUS
Status: COMPLETED
Start: 2018-02-10 | End: 2018-02-10

## 2018-02-10 RX ORDER — DIPHENHYDRAMINE HCL 25 MG
25 TABLET ORAL EVERY 6 HOURS PRN
Status: DISCONTINUED | OUTPATIENT
Start: 2018-02-10 | End: 2018-02-12 | Stop reason: HOSPADM

## 2018-02-10 RX ORDER — FENTANYL CITRATE 50 UG/ML
INJECTION, SOLUTION INTRAMUSCULAR; INTRAVENOUS AS NEEDED
Status: DISCONTINUED | OUTPATIENT
Start: 2018-02-10 | End: 2018-02-10 | Stop reason: SURG

## 2018-02-10 RX ORDER — SODIUM CHLORIDE, SODIUM LACTATE, POTASSIUM CHLORIDE, CALCIUM CHLORIDE 600; 310; 30; 20 MG/100ML; MG/100ML; MG/100ML; MG/100ML
125 INJECTION, SOLUTION INTRAVENOUS CONTINUOUS
Status: DISCONTINUED | OUTPATIENT
Start: 2018-02-10 | End: 2018-02-12 | Stop reason: HOSPADM

## 2018-02-10 RX ORDER — ACETAMINOPHEN 325 MG/1
650 TABLET ORAL EVERY 6 HOURS PRN
Status: DISCONTINUED | OUTPATIENT
Start: 2018-02-10 | End: 2018-02-12 | Stop reason: HOSPADM

## 2018-02-10 RX ORDER — DIAPER,BRIEF,INFANT-TODD,DISP
1 EACH MISCELLANEOUS AS NEEDED
Status: DISCONTINUED | OUTPATIENT
Start: 2018-02-10 | End: 2018-02-12 | Stop reason: HOSPADM

## 2018-02-10 RX ORDER — IBUPROFEN 600 MG/1
600 TABLET ORAL EVERY 6 HOURS PRN
Status: DISCONTINUED | OUTPATIENT
Start: 2018-02-10 | End: 2018-02-12 | Stop reason: HOSPADM

## 2018-02-10 RX ORDER — OXYCODONE HYDROCHLORIDE AND ACETAMINOPHEN 5; 325 MG/1; MG/1
1 TABLET ORAL EVERY 4 HOURS PRN
Status: DISCONTINUED | OUTPATIENT
Start: 2018-02-10 | End: 2018-02-12 | Stop reason: HOSPADM

## 2018-02-10 RX ORDER — OXYTOCIN/RINGER'S LACTATE 30/500 ML
PLASTIC BAG, INJECTION (ML) INTRAVENOUS
Status: COMPLETED
Start: 2018-02-10 | End: 2018-02-10

## 2018-02-10 RX ADMIN — SODIUM CHLORIDE, SODIUM LACTATE, POTASSIUM CHLORIDE, AND CALCIUM CHLORIDE 125 ML/HR: .6; .31; .03; .02 INJECTION, SOLUTION INTRAVENOUS at 08:57

## 2018-02-10 RX ADMIN — IBUPROFEN 600 MG: 600 TABLET ORAL at 23:06

## 2018-02-10 RX ADMIN — BUPIVACAINE HYDROCHLORIDE 1 ML: 2.5 INJECTION, SOLUTION INFILTRATION; PERINEURAL at 08:36

## 2018-02-10 RX ADMIN — Medication 30 UNITS: at 14:20

## 2018-02-10 RX ADMIN — SODIUM CHLORIDE, SODIUM LACTATE, POTASSIUM CHLORIDE, AND CALCIUM CHLORIDE 125 ML/HR: .6; .31; .03; .02 INJECTION, SOLUTION INTRAVENOUS at 08:10

## 2018-02-10 RX ADMIN — IBUPROFEN 600 MG: 600 TABLET ORAL at 17:26

## 2018-02-10 RX ADMIN — FENTANYL CITRATE 10 MCG: 50 INJECTION, SOLUTION INTRAMUSCULAR; INTRAVENOUS at 08:36

## 2018-02-10 RX ADMIN — ROPIVACAINE HYDROCHLORIDE: 2 INJECTION, SOLUTION EPIDURAL; INFILTRATION at 08:45

## 2018-02-10 NOTE — OB LABOR/OXYTOCIN SAFETY PROGRESS
Labor Progress Note - Milka Holland 23 y o  female MRN: [id number]    Unit/Bed#:  303-01 Encounter: [id number]    Obstetric History       T1      L1     SAB0   TAB0   Ectopic0   Multiple0   Live Births1      Gestational Age: 44w3d     Contraction Frequency (minutes): 2-3  Contraction Quality: Moderate  Tachysystole: No   Dilation: 6        Effacement (%): 90  Station: 0  Baseline Rate: 110 bpm  Fetal Heart Rate: 115 BPM  FHR Category: Category I      fetal head OA    Notes/comments:   Patient feeling itching  Mostly torso, sudden onset, no rash  Will switch to bands and observe  Expectant management            Betty Palma MD 2/10/2018 10:17 AM

## 2018-02-10 NOTE — DISCHARGE SUMMARY
Discharge Summary - Yadira Gracia 23 y o  female MRN: [id number]    Unit/Bed#: -01 Encounter: [id number]    Admission Date: 2/10/2018     Discharge Date: 2018    Admitting Diagnosis:   1  Pregnancy at 40w3d  2  SROM  3  Labor  4  Anxiety  5  External hemorrhoids    Discharge Diagnosis: same, delivered    Procedures: spontaneous vaginal delivery    Attending: Placido Miller MD    Hospital Course:     Yadira Gracia is a 23 y o   at 40w3d wks who was initially admitted for SROM and early labor  Pt presented to L&D for r/o labor initially, however, reported a gush of fluid at 0530  Pt was noted to have positive pooling, nitrazine and ferning  Initial exam 2-3/70/-3  Pt received an epidural for anesthesia  She was managed expectantly  Pt progressed to complete @1409 and pushing at 1414    She delivered a viable female  on 2/10/18 at 26  Weight 6lbs 10oz via spontaneous vaginal delivery  Apgars were 9 (1 min) and 9 (5 min)  Mom and baby recovered in room together  Her postpartum course was uncomplicated  Her postpartum pain was well controlled with oral analgesics  On day of discharge, she was ambulating and able to reasonably perform all ADLs  She was voiding and had appropriate bowel function  Pain was well controlled  She was discharged home on postpartum day #2 without complications  Patient was instructed to follow up with her OB as an outpatient and was given appropriate warnings to call provider if she develops signs of infection or uncontrolled pain  Complications: none apparent    Condition at discharge: good     Discharge instructions/Information to patient and family:   See after visit summary for information provided to patient and family  Provisions for Follow-Up Care:  See after visit summary for information related to follow-up care and any pertinent home health orders        Disposition: Home    Planned Readmission: Eloise Hernandez MD  2/12/2018

## 2018-02-10 NOTE — OB LABOR/OXYTOCIN SAFETY PROGRESS
Labor Progress Note - Adrián Dubose 23 y o  female MRN: [id number]    Unit/Bed#: -01 Encounter: [id number]    Obstetric History       T1      L1     SAB0   TAB0   Ectopic0   Multiple0   Live Births1      Gestational Age: 44w3d     Contraction Frequency (minutes): 2-4  Contraction Quality: Moderate  Tachysystole: No   Dilation: 8        Effacement (%): 90  Station: 0  Baseline Rate: 110 bpm  Fetal Heart Rate: 115 BPM  FHR Category: Category I          Notes/comments:      Patient comfortable  Expectant management         Mario Choudhury MD 2/10/2018 12:09 PM

## 2018-02-10 NOTE — ANESTHESIA PROCEDURE NOTES
CSE Block    Patient location during procedure: OB  Start time: 2/10/2018 8:30 AM  Reason for block: procedure for pain and at surgeon's request  Staffing  Anesthesiologist: Elisabet Chandler  Performed: anesthesiologist   Preanesthetic Checklist  Completed: patient identified, surgical consent, pre-op evaluation, timeout performed, IV checked, risks and benefits discussed and monitors and equipment checked  CSE  Patient position: sitting  Prep: Betadine and site prepped and draped  Patient monitoring: heart rate, continuous pulse ox and frequent blood pressure checks  Approach: midline  Spinal Needle  Needle type: pencil-tip   Needle gauge: 27 G  Needle length: 10 cm  Epidural Needle  Injection technique: MAGALIS saline  Needle type: Tuohy   Epidural needle gauge: 17 g  Location: lumbar (1-5) (L3/4)  Catheter  Catheter type: side hole  Catheter size: 19 g springwound  Catheter at skin depth: 11 cm  AssessmentInjection Assessment:  negative aspiration for heme, no pain on injection, no paresthesia on injection and positive aspiration for clear CSF  Additional Notes  Pt positioned sitting, timeout, sterile prep and drape  Placement 1 attempt/pass at L 3/4 with MAGALIS to NS  Needle through needle CSE with + CSF, easy injection, + relief  Cath threaded easily, negative aspiration  Patient laid supine with ISABELLA, PCEA initiated

## 2018-02-10 NOTE — OB LABOR/OXYTOCIN SAFETY PROGRESS
Labor Progress Note - Gretel Castanon 23 y o  female MRN: [id number]    Unit/Bed#: -01 Encounter: [id number]    Obstetric History       T1      L1     SAB0   TAB0   Ectopic0   Multiple0   Live Births1      Gestational Age: 44w3d     Contraction Frequency (minutes): 2-3  Contraction Quality: Moderate  Tachysystole: No   Dilation: 4-5        Effacement (%): 90  Station: -1  Baseline Rate: 105 bpm  Fetal Heart Rate: 126 BPM  FHR Category: Category I          Notes/comments:      Patient comfortable with epidural   Expectant management         Davi Rose MD 2/10/2018 9:28 AM

## 2018-02-10 NOTE — DISCHARGE INSTRUCTIONS
Vaginal Delivery   WHAT YOU SHOULD KNOW:   A vaginal delivery is the birth of your baby through your vagina (birth canal)  AFTER YOU LEAVE:   Medicines:  · NSAIDs  help decrease swelling and pain or fever  This medicine is available with or without a doctor's order  NSAIDs can cause stomach bleeding or kidney problems in certain people  If you take blood thinner medicine, always ask your healthcare provider if NSAIDs are safe for you  Always read the medicine label and follow directions  · Take your medicine as directed  Call your healthcare provider if you think your medicine is not helping or if you have side effects  Tell him if you are allergic to any medicine  Keep a list of the medicines, vitamins, and herbs you take  Include the amounts, and when and why you take them  Bring the list or the pill bottles to follow-up visits  Carry your medicine list with you in case of an emergency  Follow up with your primary healthcare provider:  Most women need to return 6 weeks after a vaginal delivery  Ask about how to care for your wounds or stitches  Write down your questions so you remember to ask them during your visits  Activity:  Rest as much as possible  Try to keep all activities short  You may be able to do some exercise soon after you have your baby  Talk with your primary healthcare provider before you start exercising  If you work outside the home, ask when you can return to your job  Kegel exercises:  Kegel exercises may help your vaginal and rectal muscles heal faster  You can do Kegel exercises by tightening and relaxing the muscles around your vagina  Kegel exercises help make the muscles stronger  Breast care:  When your milk comes in, your breasts may feel full and hard  Ask how to care for your breasts, even if you are not breastfeeding  Constipation:  Do not try to push the bowel movement out if it is too hard   High-fiber foods, extra liquids, and regular exercise can help you prevent constipation  Examples of high-fiber foods are fruit and bran  Prune juice and water are good liquids to drink  Regular exercise helps your digestive system work  You may also be told to take over-the-counter fiber and stool softener medicines  Take these items as directed  Hemorrhoids:  Pregnancy can cause severe hemorrhoids  You may have rectal pain because of the hemorrhoids  Ask how to prevent or treat hemorrhoids  Perineum care: Your perineum is the area between your vagina and anus  Keep the area clean and dry to help it heal and to prevent infection  Wash the area gently with soap and water when you bathe or shower  Rinse your perineum with warm water when you use the toilet  Your primary healthcare provider may suggest you use a warm sitz bath to help decrease pain  A sitz bath is a bathtub or basin filled to hip level  Stay in the sitz bath for 20 to 30 minutes, or as directed  Vaginal discharge: You will have vaginal discharge, called lochia, after your delivery  The lochia is bright red the first day or two after the birth  By the fourth day, the amount decreases, and it turns red-brown  Use a sanitary pad rather than a tampon to prevent a vaginal infection  It is normal to have lochia up to 8 weeks after your baby is born  Monthly periods: Your period may start again within 7 to 12 weeks after your baby is born  If you are breastfeeding, it may take longer for your period to start again  You can still get pregnant again even though you do not have your monthly period  Talk with your primary healthcare provider about a birth control method that will be good for you if you do not want to get pregnant  Mood changes: Many new mothers have some kind of mood changes after delivery  Some of these changes occur because of lack of sleep, hormone changes, and caring for a new baby  Some mood changes can be more serious, such as postpartum depression   Talk with your primary healthcare provider if you feel unable to care for yourself or your baby  Sexual activity:  You may need to avoid sex for 6 to 7 weeks after you have your baby  You may notice you have a decreased desire for sex, or sex may be painful  You may need to use a vaginal lubricant (gel) to help make sex more comfortable  Contact your primary healthcare provider if:   · You have heavy vaginal bleeding that fills 1 or more sanitary pads in 1 hour  · You have a fever  · Your pain does not go away, or gets worse  · The skin between your vagina and rectum is swollen, warm, or red  · You have swollen, hard, or painful breasts  · You feel very sad or depressed  · You feel more tired than usual      · You have questions or concerns about your condition or care  Seek care immediately or call 911 if:   · You have pus or yellow drainage coming from your vagina or wound  · You are urinating very little, or not at all  · Your arm or leg feels warm, tender, and painful  It may look swollen and red  · You feel lightheaded, have sudden and worsening chest pain, or trouble breathing  You may have more pain when you take deep breaths or cough, or you may cough up blood  © 2014 [address] is for End User's use only and may not be sold, redistributed or otherwise used for commercial purposes  All illustrations and images included in CareNotes® are the copyrighted property of [employer]  or [employer]  The above information is an  only  It is not intended as medical advice for individual conditions or treatments  Talk to your doctor, nurse or pharmacist before following any medical regimen to see if it is safe and effective for you

## 2018-02-10 NOTE — ANESTHESIA POSTPROCEDURE EVALUATION
Post-Op Assessment Note      CV Status:  Stable    Mental Status:  Alert and awake    Hydration Status:  Euvolemic    PONV Controlled:  Controlled    Airway Patency:  Patent    Post Op Vitals Reviewed: Yes          Staff: Anesthesiologist     Post-op block assessment: catheter intact and no complications        /52 (02/10/18 1445)    Temp      Pulse 86 (02/10/18 1445)   Resp      SpO2

## 2018-02-10 NOTE — PROGRESS NOTES
Triage Note - OB  Chichi Mayer 23 y o  female MRN: [id number]  Unit/Bed#: LD Triage 3 Encounter: [id number]    OB TRIAGE NOTE  Chichi Mayer  [id number]  2/10/2018  4:31 AM  LD Triage 3/ Triage 3-*    ASSESS:  23 y o   40w3d in early labor  PLAN  #1  Rule out labor:    0430:  2-3/70/-3  #2  SROM at 0530: admitted to L&D see H&P    D/w Dr Vito Lewis  ______________    SUBJECTIVE    YUE: Estimated Date of Delivery: 18    HPI Chronology:  23 y o  Amanuel Pardo presents with complaint of contractions that began around 0030  She reports them as moderate in intensity, every 3-6 minutes  Contractions:  Present, q 3-6 minutes  Leakage:  None  Bleeding:  None  Fetal Movement:  Present  Pelvic pain:  Present    Her pregnancy is complicated by anxiety and external hemorroids  Vitals:   /63 (BP Location: Right arm)   Pulse 87   Temp 97 7 °F (36 5 °C) (Oral)   Resp 20   Ht 5' 3" (1 6 m)   Wt 75 8 kg (167 lb)   LMP 2017 (Exact Date)   SpO2 97%   BMI 29 58 kg/m²   Body mass index is 29 58 kg/m²  Review of Systems   Constitutional: Negative  Respiratory: Negative  Cardiovascular: Negative  Gastrointestinal: Negative  Genitourinary: Positive for pelvic pain  Physical Exam   Constitutional: She is oriented to person, place, and time  She appears well-developed and well-nourished  Cardiovascular: Normal rate, regular rhythm and normal heart sounds  Exam reveals no gallop and no friction rub  No murmur heard  Pulmonary/Chest: Effort normal and breath sounds normal  No respiratory distress  She has no wheezes  She has no rales  Abdominal: Soft  Bowel sounds are normal    Gravid uterus   Neurological: She is alert and oriented to person, place, and time          Cervix: 2-3/70/-3    FHT:  Variability: Moderate 6-25 bpm  FHR Category: Category I   Baseline: 120  Accelerations: 15x15, episodic  Decelerations: absent    TOCO:   Irritability    Labs: No results found for this or any previous visit (from the past 24 hour(s))  Lab, Imaging and other studies: I have personally reviewed pertinent reports  Gale Bonilla MD  2/10/2018  4:31 AM        Addendum:    Pt was noted to be positive for SROM with SVE 3/70/-3  Admitted to L&D after discussion with Dr Loretta Treadwell to take coverage at [address] [place] [address], MD  OBKYLAHN, PGY-1  2/10/2018

## 2018-02-10 NOTE — L&D DELIVERY NOTE
Delivery Note    Obstetrician:   Brett Amezcua    Assistant: none    Pre-Delivery Diagnosis: Term pregnancy, Spontaneous labor or Single fetus    Post-Delivery Diagnosis: Same as above - Delivered    Procedure: SAVD     Episiotomy: none    Laceration: none    Estimated Blood Loss:  350 mL           Complications:  none    Cord Gas: venous 7 38 BE -1 2 Arterial 7 38 BE -0 1      Details: Patient delivered a viable Female  over intact perineum  After delivery of the  and appropriate delay, the umbilical cord was doubly clamped and cut and the  was passed to staff for routine care  Umbilical cord blood and umbilical artery and venous gases were collected  Active management of the third stage of labor was undertaken with IV pitocin  Bleeding was noted to be under control  Placenta delivered intact with 3-v cord and trailing membranes  Inspection of the perineum revealed intact perineum  Uterus was massaged and firming well and all showed excellant hemostasis  Mother and baby are currently recovering nicely in stable condition             Attending Attestation: I was present for the entire procedure

## 2018-02-10 NOTE — H&P
H&P Exam - Obstetrics   Avis Sanchez 23 y o  female MRN: [id number]  Unit/Bed#: LD Triage 3 Encounter: [id number]    >2 Midnights    INPATIENT     History of Present Illness   Chief Complaint: SROM    HPI:  Avis Sanchez is a 23 y o   female with an YUE of 2018, by Ultrasound at 40w3d weeks gestation who is being admitted for SROM, early labor  She presented initially for r/o labor and during that time, pt stated she felt a gush of fluid around 0530  After which, her contractions became stronger  Contractions: Frequency: Every 5 minutes  Leakage of fluid: onset: 0530 and color: clear  Bleeding: None  Fetal movement: present  Her current obstetrical history is significant for Anxiety and external hemorrhoids  Review of Systems   Constitutional: Negative for chills and fever  Eyes: Negative for visual disturbance  Respiratory: Negative for cough, shortness of breath and wheezing  Cardiovascular: Negative for chest pain and palpitations  Gastrointestinal: Negative for abdominal pain, constipation, diarrhea, nausea and vomiting  Genitourinary: Negative for vaginal bleeding and vaginal discharge  Neurological: Negative for weakness, light-headedness and headaches         Historical Information   OB History    Para Term  AB Living   3 1 1   1 1   SAB TAB Ectopic Multiple Live Births           1      # Outcome Date GA Lbr Jeff/2nd Weight Sex Delivery Anes PTL Lv   3 Current            2 Term 13 40w0d   M Vag-Spont EPI  JG   1 AB                 Baby complications/comments: EFW 7lbs by leopolds  Past Medical History:   Diagnosis Date    Anxiety     last assessed - [id number]    Chest pain     last assessed - [id number]    Continuous RUQ abdominal pain     last assessed - [id number]    External hemorrhoid     last assessed - [id number]    Finger injury     last assessed - [id number]    Varicella     childhood    Yeast vaginitis     last assessed - [id number] Past Surgical History:   Procedure Laterality Date    DENTAL SURGERY      INDUCED       WISDOM TOOTH EXTRACTION       Social History   History   Alcohol Use No     Comment: Social alcohol use     History   Drug Use No     History   Smoking Status    Former Smoker    Quit date: 2017   Smokeless Tobacco    Never Used     Family History: non-contributory    Meds/Allergies   {  Prescriptions Prior to Admission   Medication    Prenat w/o Y-LP-Nedpotw-FA-DHA (PNV-DHA) 27-0 6-0 4-300 MG CAPS     No Known Allergies    Objective   Vitals: Blood pressure 115/61, pulse 72, temperature 97 7 °F (36 5 °C), temperature source Oral, resp  rate 20, height 5' 3" (1 6 m), weight 75 8 kg (167 lb), last menstrual period 2017, SpO2 97 %, currently breastfeeding  Body mass index is 29 58 kg/m²  Invasive Devices          No matching active lines, drains, or airways          Physical Exam   Constitutional: She is oriented to person, place, and time  She appears well-developed and well-nourished  No distress  HENT:   Head: Normocephalic and atraumatic  Cardiovascular: Normal rate, regular rhythm and normal heart sounds  Exam reveals no gallop and no friction rub  No murmur heard  Pulmonary/Chest: Effort normal and breath sounds normal  No respiratory distress  She has no wheezes  She has no rales  Abdominal: Soft  Bowel sounds are normal    Gravid uterus   Genitourinary: Vagina normal    Neurological: She is alert and oriented to person, place, and time  Skin: She is not diaphoretic  Psychiatric: She has a normal mood and affect   Her behavior is normal      Vaginal Exam  Dilation: 3  Effacement (%): 70  Cervical Characteristics: Posterior, Moderate  Station: -3  Presentation: Vertex  Position: Unknown  Method: Manual  OB Examiner: Khoa Fitzgerald  Membranes: Ruptured, positive pooling, nitrazine and ferning  FHR: Baseline: 110 bpm, Variability: Moderate 6 - 25 bpm, Accelerations: Reactive, Decelerations: Absent and Category 1    [place]: Frequency: Every 2-5 minutes    Prenatal Labs: I have personally reviewed pertinent reports  , Blood Type:   Lab Results   Component Value Date/Time    ABO Grouping O 07/10/2017     , D (Rh type): positive  , Antibody Screen: negative , Varicella: positive history    , Rubella: immune     , VDRL/RPR: non-reactive   , Hep B:   Lab Results   Component Value Date/Time    External Hepatitis B Surface Ag neg 07/10/2017     , HIV:   Lab Results   Component Value Date/Time    External HIV-1 Antibody neg 07/10/2017     , Chlamydia: negative  , Gonorrhea: negative  , Group B Strep:    Lab Results   Component Value Date/Time    External Strep Group B Ag Negative 2018          Imaging, EKG, Pathology, and Other Studies: I have personally reviewed pertinent reports        Assessment/Plan     Assessment:  IUP at 40w3d with SROM and early labor  Plan:  1) Admit to L&D  2) CBC, T&S, RPR  3) Analgesia and/or epidural upon request  4) Expectant management  5) Anticipate     D/w Dr Ninette Gilford, MD  OBGYN, PGY-1  2/10/2018 7:22 AM

## 2018-02-11 RX ADMIN — DOCUSATE SODIUM 100 MG: 100 CAPSULE, LIQUID FILLED ORAL at 18:00

## 2018-02-11 RX ADMIN — IBUPROFEN 600 MG: 600 TABLET ORAL at 17:59

## 2018-02-11 RX ADMIN — IBUPROFEN 600 MG: 600 TABLET ORAL at 06:28

## 2018-02-11 RX ADMIN — BENZOCAINE AND MENTHOL: 20; .5 SPRAY TOPICAL at 10:47

## 2018-02-11 RX ADMIN — DOCUSATE SODIUM 100 MG: 100 CAPSULE, LIQUID FILLED ORAL at 10:47

## 2018-02-11 RX ADMIN — IBUPROFEN 600 MG: 600 TABLET ORAL at 10:47

## 2018-02-11 NOTE — PROGRESS NOTES
Progress Note - OB/GYN   Quinlan Carolina 23 y o  female MRN: [id number]  Unit/Bed#: -01 Encounter: [id number]    Assessment:  Term pregnancy, s/p , PPD#1    Plan:  Continue routine postpartum care  Encourage ambulation  Encourage increased PO water intake  Pain control as needed  Pt desires early discharge to home today    Subjective/Objective   Chief Complaint: I'm doing okay    Subjective: Pt reports feeling well today  Ambulating  Tolerating PO  Voiding without difficulty  No BM, no flatus yet  Lochia improving  Denies HA, CP, SOB, extremity pain  Objective:   Vitals: Blood pressure (!) 104/48, pulse 74, temperature 98 1 °F (36 7 °C), temperature source Oral, resp  rate 16, height 5' 3" (1 6 m), weight 75 8 kg (167 lb), last menstrual period 2017, SpO2 98 %, currently breastfeeding  ,Body mass index is 29 58 kg/m²  Intake/Output Summary (Last 24 hours) at 18 0658  Last data filed at 02/10/18 1912   Gross per 24 hour   Intake                0 ml   Output             1750 ml   Net            -1750 ml       Invasive Devices          No matching active lines, drains, or airways          Physical Exam:   Gen: AaOx3, NAD, pleasant  Pulm: No increased work of breathing  Abd: Soft, nontender, nondistended  : Fundus firm, appropriately tender, located at -1cm below umbilicus  Extremities: No edema, nontender, Negative Leighton's bilaterally      Lab, Imaging and other studies: I have personally reviewed pertinent reports              Zandra Amaya DO  OB/GYN, PGY2  2018, 6:59 AM

## 2018-02-11 NOTE — LACTATION NOTE
This note was copied from a baby's chart  CONSULT - LACTATION  Baby Girl  [facility]) [place] 1 days female MRN: [id number]    [facility] Room / Bed: (N)/(N) Encounter: [id number]    Maternal Information     MOTHER:  Yulisa Mast  Maternal Age: 23 y o    OB History: #: 1, Date: None, Sex: None, Weight: None, GA: None, Delivery: None, Apgar1: None, Apgar5: None, Living: None, Birth Comments: None    #: 2, Date: 13, Sex: Male, Weight: None, GA: 40w0d, Delivery: Vaginal, Spontaneous Delivery, Apgar1: None, Apgar5: None, Living: Living, Birth Comments: None    #: 3, Date: None, Sex: None, Weight: None, GA: None, Delivery: None, Apgar1: None, Apgar5: None, Living: None, Birth Comments: None   Previouse breast reduction surgery? No    Lactation history:   Has patient previously breast fed: No   How long had patient previously breast fed:     Previous breast feeding complications:       Past Surgical History:   Procedure Laterality Date    DENTAL SURGERY      INDUCED       WISDOM TOOTH EXTRACTION         Birth information:  YOB: 2020   Time of birth: 2:15 PM   Sex: female   Delivery type: Vaginal, Spontaneous Delivery   Birth Weight: 3005 g (6 lb 10 oz)   Percent of Weight Change: -2%     Gestational Age: 44w3d   [unfilled]      Feeding recommendations:  breast feed on demand       Met with mother  Provided mother with Ready, Set, Baby booklet  Discussed Skin to Skin contact an benefits to mom and baby  Talked about the delay of the first bath until baby has adjusted  Feeding on cue and what that means for recognizing infant's hunger  Avoidance of pacifiers for the first month discussed  Talked about exclusive breastfeeding for the first 6 months  Positioning and latch reviewed as well as showing images of other feeding positions  Discussed the properties of a good latch in any position   Reviewed hand/manual expression  Discussed s/s that baby is getting enough milk and some s/s that breastfeeding dyad may need further help  Gave information on common concerns, what to expect the first few weeks after delivery, preparing for other caregivers, and how partners can help  Resources for support also provided  C/O sore nipples  Information given about sore nipples and how to correct with positioning techniques  Discussed maneuvers to latch infant on properly to avoid nipple pain and promote healing  Discussed treatments that could be utilized to promote healing  Hydro gel dressings given with instruction and verbalization of understanding of cleaning and duration of use  Mom interested in DC today  DC book giving and pumping log started  Signs that BF is going well and when to call for support discussed with Mom  Mom reports sore nipples and pain throughout a feeding  Encouraged to call [facility] when infant wakes for next feed to help with postioning and latch, as well as to call for any needs PTD        Soha Atkins RN 2/11/2018 12:31 PM

## 2018-02-11 NOTE — LACTATION NOTE
This note was copied from a baby's chart  Reviewed positioning and latch with mom at this time  Mom latching infant independently after review  Reporting pain with latching and throughout feed  Comfort gels provided earlier encouraged to express milk and allow to dry before applying pads  Advised to only nurse on one side each time to give the other side a longer break  Also showed mom football hold to encourage changing latch to help as well  Risks of bottle feeding reviewed with Mom as well  Enc to call with any other needs

## 2018-02-12 VITALS
RESPIRATION RATE: 20 BRPM | BODY MASS INDEX: 29.59 KG/M2 | DIASTOLIC BLOOD PRESSURE: 69 MMHG | SYSTOLIC BLOOD PRESSURE: 106 MMHG | WEIGHT: 167 LBS | TEMPERATURE: 98.1 F | HEART RATE: 76 BPM | HEIGHT: 63 IN | OXYGEN SATURATION: 98 %

## 2018-02-12 LAB — RPR SER QL: NORMAL

## 2018-02-12 RX ORDER — DIAPER,BRIEF,INFANT-TODD,DISP
1 EACH MISCELLANEOUS AS NEEDED
Qty: 30 G | Refills: 0 | Status: SHIPPED | OUTPATIENT
Start: 2018-02-12 | End: 2018-03-13 | Stop reason: ALTCHOICE

## 2018-02-12 RX ORDER — DOCUSATE SODIUM 100 MG/1
100 CAPSULE, LIQUID FILLED ORAL 2 TIMES DAILY PRN
Qty: 10 CAPSULE | Refills: 0 | Status: SHIPPED | OUTPATIENT
Start: 2018-02-12 | End: 2018-03-13 | Stop reason: ALTCHOICE

## 2018-02-12 RX ORDER — ACETAMINOPHEN 325 MG/1
TABLET ORAL
Qty: 30 TABLET | Refills: 0 | Status: SHIPPED | OUTPATIENT
Start: 2018-02-12 | End: 2018-03-13 | Stop reason: ALTCHOICE

## 2018-02-12 RX ORDER — IBUPROFEN 600 MG/1
600 TABLET ORAL EVERY 6 HOURS PRN
Qty: 30 TABLET | Refills: 0 | Status: SHIPPED | OUTPATIENT
Start: 2018-02-12 | End: 2018-03-13 | Stop reason: ALTCHOICE

## 2018-02-12 RX ADMIN — OXYCODONE HYDROCHLORIDE AND ACETAMINOPHEN 1 TABLET: 5; 325 TABLET ORAL at 07:57

## 2018-02-12 RX ADMIN — DOCUSATE SODIUM 100 MG: 100 CAPSULE, LIQUID FILLED ORAL at 07:57

## 2018-02-12 RX ADMIN — IBUPROFEN 600 MG: 600 TABLET ORAL at 03:14

## 2018-02-12 RX ADMIN — IBUPROFEN 600 MG: 600 TABLET ORAL at 13:58

## 2018-02-12 NOTE — PROGRESS NOTES
Progress Note - OB/GYN   Gerline Button 23 y o  female MRN: [id number]  Unit/Bed#: -01 Encounter: [id number]    Assessment:  23 y o  [id number] s/p Spontaneous Vaginal Delivery Postpartum day  2  Possible costochondritis or MSK pain of chest wall  Patient recovering well, Stable  Disposition: Anticipated discharge today, 2/12/2018    Plan:  Continue routine post partum care  Pain management PRN  Encourage ambulation  Encourage breastfeeding    Subjective/Objective   Chief Complaint:    Postpartum state    Subjective:   Patient doing well with some complaints of mild chest soreness this morning over sternum- denies fever, chills, cough, palpitations and states it "hurts when she presses on chest"  Other than this has no complaints  Discussed reasons to call her provider postpartum including bleeding more than 1 pad 1-2 hours, signs of infection or uncontrolled severe pain    Pain: yes, cramping, improved with meds  Tolerating PO: yes  Voiding: yes  Flatus: yes  BM: no  Ambulating: yes  Breastfeeding:  yes  Chest pain: soreness, no sharp pain, no radiation, just soreness when she palpates  Shortness of breath: no  Leg pain: no  Lochia: minimal    Objective:     Vitals: Temp:  [97 6 °F (36 4 °C)-98 1 °F (36 7 °C)] 97 9 °F (36 6 °C)  HR:  [68-80] 68  Resp:  [16-18] 18  BP: ()/(44-62) 98/56     Physical Exam:   General: NAD, alert, oriented  Cardio: Regular rate and rhythm, no murmur, mild tenderness to palpation  Resp: nonlabored breathing, clear to auscultation bilaterally  Abdomen: Soft, no distension/rebound/guarding/tenderness   Fundus: Firm, non-tender, fundus: 2 cm below umbilicus  G/U: minimal lochia noted on pad  Lower Extremities: Non-tender, no palpable cords    Medications:  Current Facility-Administered Medications   Medication Dose Route Frequency    acetaminophen (TYLENOL) tablet 650 mg  650 mg Oral Q6H PRN    benzocaine-menthol-lanolin-aloe (DERMOPLAST) 20-0 5 % topical spray   Topical 4x Daily PRN    diphenhydrAMINE (BENADRYL) tablet 25 mg  25 mg Oral Q6H PRN    docusate sodium (COLACE) capsule 100 mg  100 mg Oral BID PRN    hydrocortisone 1 % cream 1 application  1 application Topical PRN    ibuprofen (MOTRIN) tablet 600 mg  600 mg Oral Q6H PRN    lactated ringers infusion  125 mL/hr Intravenous Continuous    oxyCODONE-acetaminophen (PERCOCET) 5-325 mg per tablet 1 tablet  1 tablet Oral Q4H PRN    witch hazel-glycerin (TUCKS) topical pad 1 pad  1 pad Topical PRN         Lisa Lyons  2/12/2018  6:30 AM

## 2018-02-12 NOTE — SOCIAL WORK
Breast pump consult  Per pt request, Ameda pump ordered from [facility] via [facility] for d/c today  No other CM needs noted

## 2018-02-13 NOTE — CASE MANAGEMENT
Notification of Maternity Inpatient Admission/Maternity Inpatient Authorization Request  This is a Notification of Maternity Inpatient Admission/Maternity Inpatient Authorization Request to our facility [facility]  Please be advised that this patient is currently in our facility under Inpatient Status  Below you will find the Birth/ Summary, Attending Physician and Facilitys information including NPI# and contact for the Utilization  assigned to the [facility] where the patient is receiving services  Please feel free to contact the Utilization Review Department with any questions  Mothers Information:  Farhad Christine  MRN: [id number]  YOB: 1995  Admission Date: 2/10/2018  3:50 AM  Discharge Date: 2018  2:15 PM  Disposition: Home/Self Care  Admitting Diagnosis: Encounter for full-term uncomplicated delivery [B03]  40 weeks gestation of pregnancy [Z3A 40]  [place] Information:  Estimated Date of Delivery: 18  Information for the patient's :  Maged Azizan Girl  Skyler Narayan) [[id number]]     [place] Delivery Information:  Sex: female  Delivered 2/10/2018 2:15 PM by Vaginal, Spontaneous Delivery; Gestational Age: 44w3d     Measurements:  Weight: 6 lb 10 oz (3005 g); Height: 19 5"    APGAR 1 minute 5 minutes 10 minutes   Totals: 9 9      OB History      Para Term  AB Living    3 2 2   1 2    SAB TAB Ectopic Multiple Live Births          0 2        Attending Physician:  DARY Montalvo  Specialty- Obstetrics and Gynecology  [facility] ID- [id number]  Primary [address]  Phone 1: [phone]  Fax: [phone] [facility]  [address]  [phone]  Tax ID: [id number]  NPI: [id number]    [facility] in the [facility] [employer] for 2017  Network Utilization Review Department  Phone: [phone]; Fax [phone]  ATTENTION: The Network Utilization Review Department is now centralized for our 7 Facilities  Make a note that we have a new phone and fax numbers for our Department  Please call with any questions or concerns to [phone] and carefully follow the prompts so that you are directed to the right person  All voicemails are confidential  Fax any determinations, approvals, denials, and requests for initial or continue stay review clinical to [phone]  Due to HIGH CALL volume, it would be easier if you could please send faxed requests to expedite your requests and in part, help us provide discharge notifications faster

## 2018-02-15 ENCOUNTER — TELEPHONE (OUTPATIENT)
Dept: OBGYN CLINIC | Facility: CLINIC | Age: 23
End: 2018-02-15

## 2018-02-15 ENCOUNTER — TRANSCRIBE ORDERS (OUTPATIENT)
Dept: OBGYN CLINIC | Facility: CLINIC | Age: 23
End: 2018-02-15

## 2018-02-15 DIAGNOSIS — E86.0 ANTEPARTUM DEHYDRATION: Primary | ICD-10-CM

## 2018-02-15 DIAGNOSIS — O26.899 ANTEPARTUM DEHYDRATION: Primary | ICD-10-CM

## 2018-02-15 NOTE — TELEPHONE ENCOUNTER
Per Dr Adal Matias, review that she may be a little dehydrated, increase fluid intake at least 8-10 glasses of water up to 2L a day    Send for CBC-D and CMP and schedule a f/u in the office

## 2018-02-15 NOTE — TELEPHONE ENCOUNTER
Reviewed with patient, she is going to lab tomorrow morning for b/w, keep this task open to f/u with b/w

## 2018-02-16 ENCOUNTER — APPOINTMENT (OUTPATIENT)
Dept: LAB | Facility: HOSPITAL | Age: 23
End: 2018-02-16
Attending: PHYSICIAN ASSISTANT
Payer: COMMERCIAL

## 2018-02-16 DIAGNOSIS — E86.0 ANTEPARTUM DEHYDRATION: ICD-10-CM

## 2018-02-16 DIAGNOSIS — O26.899 ANTEPARTUM DEHYDRATION: ICD-10-CM

## 2018-02-16 DIAGNOSIS — R10.11 RIGHT UPPER QUADRANT PAIN: ICD-10-CM

## 2018-02-16 DIAGNOSIS — Z34.90 ENCOUNTER FOR SUPERVISION OF NORMAL PREGNANCY: ICD-10-CM

## 2018-02-16 DIAGNOSIS — Z34.81 ENCOUNTER FOR SUPERVISION OF OTHER NORMAL PREGNANCY, FIRST TRIMESTER: ICD-10-CM

## 2018-02-16 LAB
ALBUMIN SERPL BCP-MCNC: 3.2 G/DL (ref 3.5–5)
ALP SERPL-CCNC: 123 U/L (ref 46–116)
ALT SERPL W P-5'-P-CCNC: 25 U/L (ref 12–78)
ANION GAP SERPL CALCULATED.3IONS-SCNC: 9 MMOL/L (ref 4–13)
AST SERPL W P-5'-P-CCNC: 14 U/L (ref 5–45)
BASOPHILS # BLD AUTO: 0.03 THOUSANDS/ΜL (ref 0–0.1)
BASOPHILS NFR BLD AUTO: 0 % (ref 0–1)
BILIRUB SERPL-MCNC: 0.27 MG/DL (ref 0.2–1)
BUN SERPL-MCNC: 16 MG/DL (ref 5–25)
CALCIUM SERPL-MCNC: 9 MG/DL (ref 8.3–10.1)
CHLORIDE SERPL-SCNC: 105 MMOL/L (ref 100–108)
CO2 SERPL-SCNC: 27 MMOL/L (ref 21–32)
CREAT SERPL-MCNC: 0.7 MG/DL (ref 0.6–1.3)
EOSINOPHIL # BLD AUTO: 0.2 THOUSAND/ΜL (ref 0–0.61)
EOSINOPHIL NFR BLD AUTO: 2 % (ref 0–6)
ERYTHROCYTE [DISTWIDTH] IN BLOOD BY AUTOMATED COUNT: 13.2 % (ref 11.6–15.1)
GFR SERPL CREATININE-BSD FRML MDRD: 122 ML/MIN/1.73SQ M
GLUCOSE SERPL-MCNC: 86 MG/DL (ref 65–140)
HCT VFR BLD AUTO: 40.1 % (ref 34.8–46.1)
HGB BLD-MCNC: 13.4 G/DL (ref 11.5–15.4)
LYMPHOCYTES # BLD AUTO: 2.06 THOUSANDS/ΜL (ref 0.6–4.47)
LYMPHOCYTES NFR BLD AUTO: 25 % (ref 14–44)
MCH RBC QN AUTO: 31.3 PG (ref 26.8–34.3)
MCHC RBC AUTO-ENTMCNC: 33.4 G/DL (ref 31.4–37.4)
MCV RBC AUTO: 94 FL (ref 82–98)
MONOCYTES # BLD AUTO: 0.57 THOUSAND/ΜL (ref 0.17–1.22)
MONOCYTES NFR BLD AUTO: 7 % (ref 4–12)
NEUTROPHILS # BLD AUTO: 5.31 THOUSANDS/ΜL (ref 1.85–7.62)
NEUTS SEG NFR BLD AUTO: 66 % (ref 43–75)
NRBC BLD AUTO-RTO: 0 /100 WBCS
PLATELET # BLD AUTO: 221 THOUSANDS/UL (ref 149–390)
PMV BLD AUTO: 10.1 FL (ref 8.9–12.7)
POTASSIUM SERPL-SCNC: 3.9 MMOL/L (ref 3.5–5.3)
PROT SERPL-MCNC: 7.7 G/DL (ref 6.4–8.2)
RBC # BLD AUTO: 4.28 MILLION/UL (ref 3.81–5.12)
SODIUM SERPL-SCNC: 141 MMOL/L (ref 136–145)
WBC # BLD AUTO: 8.18 THOUSAND/UL (ref 4.31–10.16)

## 2018-02-16 PROCEDURE — 80053 COMPREHEN METABOLIC PANEL: CPT

## 2018-02-16 PROCEDURE — 85025 COMPLETE CBC W/AUTO DIFF WBC: CPT

## 2018-02-16 PROCEDURE — 36415 COLL VENOUS BLD VENIPUNCTURE: CPT

## 2018-02-23 LAB — PLACENTA IN STORAGE: NORMAL

## 2018-03-13 ENCOUNTER — POSTPARTUM VISIT (OUTPATIENT)
Dept: OBGYN CLINIC | Facility: CLINIC | Age: 23
End: 2018-03-13

## 2018-03-13 ENCOUNTER — TELEPHONE (OUTPATIENT)
Dept: BEHAVIORAL/MENTAL HEALTH CLINIC | Facility: CLINIC | Age: 23
End: 2018-03-13

## 2018-03-13 VITALS
WEIGHT: 151 LBS | DIASTOLIC BLOOD PRESSURE: 80 MMHG | HEIGHT: 63 IN | BODY MASS INDEX: 26.75 KG/M2 | SYSTOLIC BLOOD PRESSURE: 110 MMHG

## 2018-03-13 DIAGNOSIS — Z91.89 BREASTFEEDING PROBLEM: ICD-10-CM

## 2018-03-13 DIAGNOSIS — Z30.41 ENCOUNTER FOR SURVEILLANCE OF CONTRACEPTIVE PILLS: ICD-10-CM

## 2018-03-13 PROBLEM — Z3A.38 38 WEEKS GESTATION OF PREGNANCY: Status: RESOLVED | Noted: 2018-01-26 | Resolved: 2018-03-13

## 2018-03-13 PROBLEM — Z3A.40 40 WEEKS GESTATION OF PREGNANCY: Status: RESOLVED | Noted: 2018-02-10 | Resolved: 2018-03-13

## 2018-03-13 PROCEDURE — 99024 POSTOP FOLLOW-UP VISIT: CPT | Performed by: NURSE PRACTITIONER

## 2018-03-13 RX ORDER — SERTRALINE HYDROCHLORIDE 25 MG/1
25 TABLET, FILM COATED ORAL DAILY
Qty: 30 TABLET | Refills: 1 | Status: SHIPPED | OUTPATIENT
Start: 2018-03-13 | End: 2019-04-02 | Stop reason: ALTCHOICE

## 2018-03-13 RX ORDER — ACETAMINOPHEN AND CODEINE PHOSPHATE 120; 12 MG/5ML; MG/5ML
1 SOLUTION ORAL DAILY
Qty: 84 TABLET | Refills: 3 | Status: SHIPPED | OUTPATIENT
Start: 2018-03-13 | End: 2019-04-02 | Stop reason: ALTCHOICE

## 2018-03-13 RX ORDER — METOCLOPRAMIDE 10 MG/1
TABLET ORAL
Qty: 35 TABLET | Refills: 0 | Status: SHIPPED | OUTPATIENT
Start: 2018-03-13 | End: 2018-03-27

## 2018-03-13 NOTE — TELEPHONE ENCOUNTER
----- Message from [address] [place] [address],[facility] sent at 3/13/2018 11:04 AM EDT -----  Regarding: PPD referal  Please contact patient  Was started on Zoloft 25mg daily   second child denies any history of PPD with first child    EPDS: 25

## 2018-03-13 NOTE — TELEPHONE ENCOUNTER
[facility]    Referred by: Bishnu Pinzon    Check with provider before scheduling    Are there any developmental disabilities? No    Does the patient have hearing impairment? No    Does the patient have ICM or CTT? No    Taking injectable psychiatric medications? NoIf yes, patient can not be seen here  Has the patient ever seen or currently see a psychiatrist? No If yes who/when? Has the patient ever seen or currently see a therapist? Yes If yes who/when? 2 yrs ago,unable to recall name ,for anger management    How many visits did the pt have for previous psychiatric treatment? N/A     History    Has the patient served in the [place] [address]? No    If yes, have you had combat services? No    Was the patient activated into federal active duty as a member of the national guard or reserve? No    Minor Child    Who has custody of the child? N/A    Is there a custody agreement? N/A    If there is a custody agreement remind parent that they must bring a copy to the first appt or they will not be seen  Behavorial Health Outpatient Intake History     Presenting Problem (in patient's words) POST PARTUM DEPRESSION    Substance Abuse:No concerns of substance abuse are reported  Has the patient been seen here previously, either inpatient or outpatient? No outpatient    If seen as outpatient, what provider(s) did the patient see? N/A    A member of the patient's family has been in therapy here with NO    ACCEPTED as a patient Yes Appointment Date: 18 AT 2PM AZEEM UNGER & 18 AT 2PM DR MIKE BILLINGS    Referred Elsewhere?  No    Primary Care Physician: Brandin Mercado,     PCP telephone number: [phone]    SUB: Hugo Cartagena      : 68  EMPLOYER: [employer]  ADD: [address] ,[place] [address]  TEL: [phone]  INS: BLUE CROSS  Id: [id number]  SECONDARY:  [facility]  Id:

## 2018-03-13 NOTE — PROGRESS NOTES
Assessment/Plan:    Reviewed with patient normal exam today  Reviewed patient may resume normal activity including exercise, and intercourse  Advised water based lubrication  Pt states going to dentist for tooth infection, asked about antibiotics  Advised patient to tell dentist she is currently breastfeeding and if she is unsure if medication is safe she can call office and speak to Dinorah Oconnor or myself  Reviewed Tylenol and Motrin safe to take for pain while breastfeeding  Reviwed with patient pumping/feeding infant every 2-3 hours  Staying well hydrated  Rx for Reglan sent to pharmacy  Reviewed how to take, as well as side effects  Reviewed postpartum depression with patient  Spoke about how she is feeling and if she notices anything that is triggering her depression  Pt unsure just does not feel like herself, feels depressed all the time and is crying frequently  Pt thought is was normal and would go away but it has been getting worse  Pt denies any thoughts of harming herself or anyone else  Reviewed options to help with postpartum depression  Pt open to counseling and medication  Referral for postpartum depression to [facility] placed  Rx for Zoloft 25mg sent to pharmacy with instructions to take 25mg daily for 2 weeks and if still not feeling any better can increase dose to 50mg daily  Side effects reviewed with patient  Recommended baby and me postpartum support group  Reviewed with patient PPD can last a year  Pt to RTO in 4 weeks for recheck on postpartum depression  Advised pt if at any time she feels like harming herself, her children or anyone else I instructed her to immediately report to the nearest ER  Encounter for postpartum visit  Reviewed with patient may resume all normal activity  Advised lubrication for intercourse         Diagnoses and all orders for this visit:    Encounter for postpartum visit    Encounter for surveillance of contraceptive pills  - norethindrone (MICRONOR) 0 35 MG tablet; Take 1 tablet (0 35 mg total) by mouth daily    Postpartum depression  -     sertraline (ZOLOFT) 25 mg tablet; Take 1 tablet (25 mg total) by mouth daily    Breastfeeding problem  -     metoclopramide (REGLAN) 10 mg tablet; Day 1 Take one tablet by mouth  Day 2 Take one tablet by mouth twice a day  Day 3-11 Take one tablet by mouth three times a day  Day 12 Take one tablet by mouth twice a day  Day 13 Take one tablet by mouth          Subjective:      Patient ID: Che Bailey is a 23 y o  female  Pt presents today for postpartum exam  Pt was a vaginal delivery on 2/10/18  Pt delivered a female  weighing 6lb  10oz  19 5in  With Apgars of 9/9  Pt is currently breastfeeding and supplementing with formula due to supply issues  Pt states has tried all of the OTC remedies, such as lactation cookies, fenugreek, etc  Reviewed with patient to feed/pump every 2-3 hours around the clock  Stay well hydrated  Reviewed can try prescription Reglan along with pumping and staying well hydrated  Reviewed side effects, how to take medication  Will send over script  Pt states bleeding is minimal with occasional gushes  Pt denies any pain  Pt states has retruned to normal activity with no problems  Appropriate contraceptive methods reviewed with patient and patient would like progestin only pill  Reviewed with patient must take same time each day  Back up method for first month after starting  Pt states feeling different after this child  She has a 3year old son and denies any issues with postpartum depression with him  Pt states she is just more tearful, with frequent crying  Not her usual happy self  Pt states her mother and  have also noticed a difference in her  Pt denies any thoughts of harming herself or her children         The following portions of the patient's history were reviewed and updated as appropriate: allergies, current medications, past family history, past medical history, past social history, past surgical history and problem list     Review of Systems   Genitourinary: Positive for vaginal bleeding (minimal)  All other systems reviewed and are negative  Objective:    /80 (BP Location: Left arm, Patient Position: Sitting, Cuff Size: Standard)   Ht 5' 3" (1 6 m)   Wt 68 5 kg (151 lb)   LMP 04/22/2017 (Exact Date)   BMI 26 75 kg/m²      Physical Exam   Constitutional: She is oriented to person, place, and time  She appears well-developed and well-nourished  HENT:   Head: Normocephalic  Neck: Normal range of motion  Neck supple  No tracheal deviation present  No thyromegaly present  Cardiovascular: Normal rate, regular rhythm and normal heart sounds  Pulmonary/Chest: Effort normal and breath sounds normal  Right breast exhibits no inverted nipple, no mass, no nipple discharge, no skin change and no tenderness  Left breast exhibits no inverted nipple, no mass, no nipple discharge, no skin change and no tenderness  Breasts are symmetrical    Abdominal: Soft  Bowel sounds are normal  She exhibits no distension and no mass  There is no tenderness  There is no rebound and no guarding  Genitourinary: Vagina normal and uterus normal  No breast swelling, tenderness, discharge or bleeding  No labial fusion  There is no rash, tenderness, lesion or injury on the right labia  There is no rash, tenderness, lesion or injury on the left labia  Cervix exhibits no motion tenderness (closed), no discharge and no friability  Right adnexum displays no mass, no tenderness and no fullness  Left adnexum displays no mass, no tenderness and no fullness  Musculoskeletal: Normal range of motion  Neurological: She is alert and oriented to person, place, and time  Skin: Skin is warm and dry  Psychiatric: She has a normal mood and affect   Her behavior is normal  Judgment and thought content normal

## 2018-03-15 DIAGNOSIS — Z91.89 BREASTFEEDING PROBLEM: ICD-10-CM

## 2018-04-11 ENCOUNTER — POSTPARTUM VISIT (OUTPATIENT)
Dept: OBGYN CLINIC | Facility: CLINIC | Age: 23
End: 2018-04-11
Payer: COMMERCIAL

## 2018-04-11 VITALS
WEIGHT: 154 LBS | SYSTOLIC BLOOD PRESSURE: 108 MMHG | DIASTOLIC BLOOD PRESSURE: 70 MMHG | BODY MASS INDEX: 27.29 KG/M2 | HEIGHT: 63 IN

## 2018-04-11 DIAGNOSIS — Z91.89 BREASTFEEDING PROBLEM: Primary | ICD-10-CM

## 2018-04-11 PROCEDURE — 99212 OFFICE O/P EST SF 10 MIN: CPT | Performed by: NURSE PRACTITIONER

## 2018-04-11 RX ORDER — METOCLOPRAMIDE 10 MG/1
10 TABLET ORAL 4 TIMES DAILY
Qty: 35 TABLET | Refills: 0 | Status: SHIPPED | OUTPATIENT
Start: 2018-04-11 | End: 2019-04-02 | Stop reason: ALTCHOICE

## 2018-04-11 NOTE — PROGRESS NOTES
Assessment/Plan:    Postpartum depression  Pt to continue to follow up with psychiatry for postpartum depression  Can start Zoloft if feels the need to  Call office for follow up if things seem worse  Pt currently states she is managing fine and denies any thoughts of harming herself or anyone else  Reviewed if she does to report to ER immediately  Pt states has lots of support at home  RTO for annual exam or sooner as needed    Breastfeeding problem  Rx for Reglan given  Reviewed stay well hydrated  Pump or feed every 2 hours  Follow up with lactation consultant        Diagnoses and all orders for this visit:    Breastfeeding problem  -     metoclopramide (REGLAN) 10 mg tablet; Take 1 tablet (10 mg total) by mouth 4 (four) times a day Day 1 Take one tablet by mouth  Day 2 Take one tablet by mouth twice a day  Day 3-11 Take one tablet by mouth three times a day  Day 12 Take one tablet by mouth twice a day  Day 13 Take one tablet by mouth    Postpartum depression        Subjective:      Patient ID: Marlon Palacios is a 23 y o  female  Pt here for a follow up for postpartum depression  Pt is a  with no prior history of depression or postpartum depression  Pt was seen for routine postpartum follow up after delivery and EPDS score at the time was 18  After speaking with patient the decision was made in conjunction and agreement that the patient would start medication to help with depression  Pt was also referred to [facility] for postpartum depression  Pt states she never took the medication but is feeling better  Pt states life has settled down a bit  Reports she never took the Zoloft, pt states she was worried about side effects  Pt states she did have a phone intake with psychiatry and has an upcoming appointment 18  Pt states she has the medication at home in case she feels as though she needs it but is ok at this point   Pt states she will continue to follow up with psychiatry  Current EPDS score 7 prior EPDS score 18  Pt states still having difficulty with milk supply  Pt did take Reglan regimen which did increase her supply but since stopping pt reports her supply is now down again and even worse this time  Pt states she is staying really hydrated and is currently latching daughter to breast as well as supplementing with formula  Pt states when she took the Reglan she did not need to supplement and had a great supply  Reviewed will prescribe again but strongly encouraged patient to follow up with lactation consult if continues  Reviewed pumping or feeding every 2-3 hours  Staying well hydrated  The following portions of the patient's history were reviewed and updated as appropriate: allergies, current medications, past family history, past medical history, past social history, past surgical history and problem list     Review of Systems   All other systems reviewed and are negative  Objective:    /70 (BP Location: Left arm, Cuff Size: Standard)   Ht 5' 3" (1 6 m)   Wt 69 9 kg (154 lb)   BMI 27 28 kg/m²      Physical Exam   Constitutional: She is oriented to person, place, and time  She appears well-developed and well-nourished  HENT:   Head: Normocephalic and atraumatic  Eyes: Conjunctivae are normal  Pupils are equal, round, and reactive to light  Neck: Normal range of motion  Neck supple  Cardiovascular: Normal rate, regular rhythm and normal heart sounds  Pulmonary/Chest: Effort normal and breath sounds normal    Abdominal: Soft  Bowel sounds are normal    Musculoskeletal: Normal range of motion  Neurological: She is alert and oriented to person, place, and time  Skin: Skin is warm and dry  Psychiatric: She has a normal mood and affect   Her behavior is normal  Judgment and thought content normal

## 2018-04-11 NOTE — ASSESSMENT & PLAN NOTE
Pt to continue to follow up with psychiatry for postpartum depression  Can start Zoloft if feels the need to  Call office for follow up if things seem worse  Pt currently states she is managing fine and denies any thoughts of harming herself or anyone else  Reviewed if she does to report to ER immediately  Pt states has lots of support at home    RTO for annual exam or sooner as needed

## 2018-04-11 NOTE — ASSESSMENT & PLAN NOTE
Rx for Reglan given  Reviewed stay well hydrated  Pump or feed every 2 hours  Follow up with lactation consultant

## 2018-05-18 ENCOUNTER — TELEPHONE (OUTPATIENT)
Dept: BEHAVIORAL/MENTAL HEALTH CLINIC | Facility: CLINIC | Age: 23
End: 2018-05-18
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
## 2018-06-12 DIAGNOSIS — Z30.9 ENCOUNTER FOR CONTRACEPTIVE MANAGEMENT, UNSPECIFIED TYPE: Primary | ICD-10-CM

## 2018-06-21 ENCOUNTER — OFFICE VISIT (OUTPATIENT)
Dept: OBGYN CLINIC | Facility: CLINIC | Age: 23
End: 2018-06-21
Payer: COMMERCIAL

## 2018-06-21 VITALS
SYSTOLIC BLOOD PRESSURE: 110 MMHG | HEIGHT: 63 IN | DIASTOLIC BLOOD PRESSURE: 80 MMHG | WEIGHT: 148 LBS | BODY MASS INDEX: 26.22 KG/M2

## 2018-06-21 DIAGNOSIS — N30.01 ACUTE CYSTITIS WITH HEMATURIA: Primary | ICD-10-CM

## 2018-06-21 LAB
SL AMB  POCT GLUCOSE, UA: NEGATIVE
SL AMB LEUKOCYTE ESTERASE,UA: ABNORMAL
SL AMB POCT BLOOD,UA: ABNORMAL
SL AMB POCT CLARITY,UA: CLEAR
SL AMB POCT COLOR,UA: YELLOW
SL AMB POCT KETONES,UA: NEGATIVE
SL AMB POCT NITRITE,UA: NEGATIVE
SL AMB POCT PH,UA: 5
SL AMB POCT SPECIFIC GRAVITY,UA: 1.01
SL AMB POCT URINE PROTEIN: ABNORMAL

## 2018-06-21 PROCEDURE — 81002 URINALYSIS NONAUTO W/O SCOPE: CPT | Performed by: NURSE PRACTITIONER

## 2018-06-21 PROCEDURE — 99213 OFFICE O/P EST LOW 20 MIN: CPT | Performed by: NURSE PRACTITIONER

## 2018-06-21 RX ORDER — NITROFURANTOIN 25; 75 MG/1; MG/1
100 CAPSULE ORAL 2 TIMES DAILY
Qty: 14 CAPSULE | Refills: 0 | Status: SHIPPED | OUTPATIENT
Start: 2018-06-21 | End: 2018-06-28

## 2018-06-21 NOTE — LETTER
To whom it may concern,    Gianluca SHAW: 1995 was seen in my office today 18  Macrobid (antibiotic) was prescribed for patient to take twice a day for 7 days  Any questions please do not hesitate to call         Sincerely,        LYNNE Dorsey

## 2018-06-22 NOTE — PROGRESS NOTES
Assessment/Plan:    Acute cystitis with hematuria  Urine dip in office revealed + leukocytes and blood in urine  Based on symptoms and urine dip in office will treat for UTI  Rx for macrobid sent to pharmacy, reviewed instructions on taking and common side effects  Will send out urine for culture  Note given stating patient currently taking antibiotic, given per patient request  When asked why she wanted a note, and who she was giving it to  Pt was very vague, and just stated she wanted it in case someone asked  Pt continuing to follow with psych for postpartum depression  States going well, Is not taking Zoloft and declines needing it at this time  RTO for annual exam      Diagnoses and all orders for this visit:    Acute cystitis with hematuria  -     nitrofurantoin (MACROBID) 100 mg capsule; Take 1 capsule (100 mg total) by mouth 2 (two) times a day for 7 days  -     POCT urine dip  -     GP Urine Culture          Subjective:      Patient ID: Loren Harding is a 20 y o  female  Pt presents today with complaints of dysuria, frequency, urgency and hesitancy  Symptoms have been present for 2 days and have stayed the same over the last two days  Denies any fever/chills/ malaise  Denies any back pain  Denies any blood in urine or foul smelling urine  Has not taken anything for the pain  Denies any new medications  Denies any abnormal vaginal discharge, itching, or odor  Denies any pelvic pain or dyspareunia  Denies the need for STD testing  Pt is 4 months postpartum  No longer breastfeeding  Menses are monthly   LMP: 6/11/18  Contraception: Ortho Evra Patch  Following with psych for postpartum depression  Script was given at previous appointment for Zoloft, but pt denies taking it as states she does not need it at this point  States since returning to work things have been much better since she can have interaction with someone other than children all day           The following portions of the patient's history were reviewed and updated as appropriate: allergies, current medications, past family history, past medical history, past social history, past surgical history and problem list     Review of Systems   Genitourinary: Positive for dysuria, frequency and urgency  All other systems reviewed and are negative  Objective:  /80 (BP Location: Left arm, Patient Position: Sitting, Cuff Size: Standard)   Ht 5' 3" (1 6 m)   Wt 67 1 kg (148 lb)   LMP 06/11/2018 (Exact Date)   BMI 26 22 kg/m²      Physical Exam   Constitutional: She is oriented to person, place, and time  She appears well-developed and well-nourished  HENT:   Head: Normocephalic and atraumatic  Cardiovascular: Normal rate, regular rhythm and normal heart sounds  Pulmonary/Chest: Effort normal and breath sounds normal    Abdominal: Soft  Bowel sounds are normal  She exhibits no distension and no mass  There is no tenderness  There is no rebound, no guarding and no CVA tenderness  Musculoskeletal: Normal range of motion  Neurological: She is alert and oriented to person, place, and time  Skin: Skin is warm and dry  Psychiatric: She has a normal mood and affect   Her behavior is normal  Judgment and thought content normal

## 2018-06-22 NOTE — ASSESSMENT & PLAN NOTE
Urine dip in office revealed + leukocytes and blood in urine  Based on symptoms and urine dip in office will treat for UTI  Rx for macrobid sent to pharmacy, reviewed instructions on taking and common side effects  Will send out urine for culture  Note given stating patient currently taking antibiotic, given per patient request  When asked why she wanted a note, and who she was giving it to  Pt was very vague, and just stated she wanted it in case someone asked  Pt continuing to follow with psych for postpartum depression  States going well, Is not taking Zoloft and declines needing it at this time     RTO for annual exam

## 2018-06-24 LAB — BACTERIA UR CULT: ABNORMAL

## 2018-06-25 ENCOUNTER — TELEPHONE (OUTPATIENT)
Dept: PSYCHIATRY | Facility: CLINIC | Age: 23
End: 2018-06-25

## 2018-06-26 ENCOUNTER — TELEPHONE (OUTPATIENT)
Dept: PSYCHIATRY | Facility: CLINIC | Age: 23
End: 2018-06-26

## 2018-06-27 ENCOUNTER — TELEPHONE (OUTPATIENT)
Dept: BEHAVIORAL/MENTAL HEALTH CLINIC | Facility: CLINIC | Age: 23
End: 2018-06-27

## 2018-07-10 DIAGNOSIS — Z30.9 ENCOUNTER FOR CONTRACEPTIVE MANAGEMENT, UNSPECIFIED TYPE: ICD-10-CM

## 2018-08-13 ENCOUNTER — TELEPHONE (OUTPATIENT)
Dept: PSYCHIATRY | Facility: CLINIC | Age: 23
End: 2018-08-13

## 2018-08-14 ENCOUNTER — ANNUAL EXAM (OUTPATIENT)
Dept: OBGYN CLINIC | Facility: CLINIC | Age: 23
End: 2018-08-14
Payer: COMMERCIAL

## 2018-08-14 VITALS
WEIGHT: 147 LBS | BODY MASS INDEX: 26.05 KG/M2 | HEIGHT: 63 IN | SYSTOLIC BLOOD PRESSURE: 110 MMHG | DIASTOLIC BLOOD PRESSURE: 78 MMHG

## 2018-08-14 DIAGNOSIS — R10.2 PELVIC PAIN: ICD-10-CM

## 2018-08-14 DIAGNOSIS — Z01.419 ENCNTR FOR GYN EXAM (GENERAL) (ROUTINE) W/O ABN FINDINGS: Primary | ICD-10-CM

## 2018-08-14 DIAGNOSIS — N94.10 DYSPAREUNIA, FEMALE: ICD-10-CM

## 2018-08-14 PROCEDURE — S0612 ANNUAL GYNECOLOGICAL EXAMINA: HCPCS | Performed by: NURSE PRACTITIONER

## 2018-08-14 NOTE — PROGRESS NOTES
Assessment/Plan   Diagnoses and all orders for this visit:    Dyspareunia, female  -     US pelvis complete w transvaginal; Future    Pelvic pain  -     US pelvis complete w transvaginal; Future        Discussion    Reviewed with patient normal exam today  Pap deferred today  Rx for pelvic ultrasound to assess for new onset dyspareunia and pelvic pain  Normal breast exam today  Monthly SBEs advised  Contraception: Ortho Evra Patch, rx for refill sent  Vitamin D and Calcim Supplements advised  Exercise most days of the week  Follow with PCP for regular check-ups and blood work  RTO 1 year for annual or sooner as needed  Subjective     Ale Oconnell is a 20 y o  female who presents for annual well woman exam    Pt is 6 months postpartum  Following with Psychiatry for pp depression  Declined meds  States things are better every day  Last exam 17 Pap Normal GC/CT negative   Pap guidelines reviewed with patient  Pap deferred today  Pt denies any abnormal vaginal discharge, itching, or odor  Pt in a mutually exclusive relationship () with a male partner and denies the need for STD testing today  Pt complains of pelvic pain and pain with intercourse that is new in origin  Symptoms began 2-3 months ago  Have not noticed anything that makes pain worse or better  Declines any resolve with changing of positions  Declines any problems with vaginal dryness  Menstrual Cycle:  LMP: 18  Period Cycle (Days): 30  Period Duration (Days): 5-7  Period Pattern: Regular  Menstrual Flow: Heavy  Menstrual Control: Tampon  Menstrual Control Change Freq (Hours): 3-4  Dysmenorrhea: None  OB History      Para Term  AB Living    3 2 2   1 2    SAB TAB Ectopic Multiple Live Births          0 2    Contraception: Ortho Evra Patch, working well would like to continue  Practices monthly SBEs, no breast complaints today  Denies any bowel or bladder issues    Pt follows with PCP for regular check-ups and blood work  Review of Systems   Genitourinary: Positive for dyspareunia and pelvic pain  All other systems reviewed and are negative  The following portions of the patient's history were reviewed and updated as appropriate: allergies, current medications, past family history, past medical history, past social history, past surgical history and problem list       Past Medical History:   Diagnosis Date    Anxiety     last assessed - 2018    Chest pain     last assessed - [id number]    Continuous RUQ abdominal pain     last assessed - [id number]    External hemorrhoid     last assessed - [id number]    Finger injury     last assessed - [id number]    Varicella     childhood    Yeast vaginitis     last assessed - [id number]       Past Surgical History:   Procedure Laterality Date    DENTAL SURGERY      INDUCED       WISDOM TOOTH EXTRACTION         Family History   Problem Relation Age of Onset    Cancer Maternal Aunt     No Known Problems Mother     Colon cancer Cousin     Diabetes Other     Breast cancer Maternal Aunt        Social History     Social History    Marital status: Single     Spouse name: N/A    Number of children: N/A    Years of education: N/A     Occupational History    Not on file       Social History Main Topics    Smoking status: Former Smoker     Quit date: 2017    Smokeless tobacco: Never Used    Alcohol use No      Comment: Social alcohol use    Drug use: No    Sexual activity: Yes     Partners: Male     Other Topics Concern    Not on file     Social History Narrative    No narrative on file         Current Outpatient Prescriptions:     metoclopramide (REGLAN) 10 mg tablet, Take 1 tablet (10 mg total) by mouth 4 (four) times a day Day 1 Take one tablet by mouth Day 2 Take one tablet by mouth twice a day Day 3-11 Take one tablet by mouth three times a day Day 12 Take one tablet by mouth twice a day Day 13 Take one tablet by mouth, Disp: 35 tablet, Rfl: 0    norelgestromin-ethinyl estradiol (ORTHO EVRA) 150-35 MCG/24HR, Place 1 patch on the skin once a week, Disp: 12 patch, Rfl: 3    norethindrone (MICRONOR) 0 35 MG tablet, Take 1 tablet (0 35 mg total) by mouth daily, Disp: 84 tablet, Rfl: 3    Prenat w/o U-MZ-Bjqmbpu-FA-DHA (PNV-DHA) 27-0 6-0 4-300 MG CAPS, Take 1 capsule by mouth daily, Disp: , Rfl: 11    sertraline (ZOLOFT) 25 mg tablet, Take 1 tablet (25 mg total) by mouth daily, Disp: 30 tablet, Rfl: 1    No Known Allergies    Objective   Vitals:    08/14/18 1134   BP: 110/78   BP Location: Left arm   Patient Position: Sitting   Cuff Size: Standard   Weight: 66 7 kg (147 lb)   Height: 5' 3" (1 6 m)     Physical Exam   Constitutional: She is oriented to person, place, and time  She appears well-developed and well-nourished  HENT:   Head: Normocephalic  Neck: Normal range of motion  Neck supple  No tracheal deviation present  No thyromegaly present  Cardiovascular: Normal rate, regular rhythm and normal heart sounds  Pulmonary/Chest: Effort normal and breath sounds normal  Right breast exhibits no inverted nipple, no mass, no nipple discharge, no skin change and no tenderness  Left breast exhibits no inverted nipple, no mass, no nipple discharge, no skin change and no tenderness  Breasts are symmetrical    Abdominal: Soft  Bowel sounds are normal  She exhibits no distension and no mass  There is no tenderness  There is no rebound and no guarding  Genitourinary: Vagina normal and uterus normal  No breast swelling, tenderness, discharge or bleeding  No labial fusion  There is no rash, tenderness, lesion or injury on the right labia  There is no rash, tenderness, lesion or injury on the left labia  Cervix exhibits no motion tenderness, no discharge and no friability  Right adnexum displays no mass, no tenderness and no fullness  Left adnexum displays no mass, no tenderness and no fullness  Musculoskeletal: Normal range of motion  Neurological: She is alert and oriented to person, place, and time  Skin: Skin is warm and dry  Psychiatric: She has a normal mood and affect   Her behavior is normal  Judgment and thought content normal

## 2018-09-06 ENCOUNTER — HOSPITAL ENCOUNTER (OUTPATIENT)
Dept: RADIOLOGY | Age: 23
Discharge: HOME | End: 2018-09-06
Payer: COMMERCIAL

## 2018-09-06 DIAGNOSIS — R10.2 PELVIC PAIN: ICD-10-CM

## 2018-09-06 DIAGNOSIS — N94.10 DYSPAREUNIA, FEMALE: ICD-10-CM

## 2018-09-06 PROCEDURE — 76856 US EXAM PELVIC COMPLETE: CPT

## 2018-09-06 PROCEDURE — 76830 TRANSVAGINAL US NON-OB: CPT

## 2018-10-04 ENCOUNTER — HOSPITAL ENCOUNTER (EMERGENCY)
Facility: HOSPITAL | Age: 23
Discharge: HOME | End: 2018-10-04
Attending: EMERGENCY MEDICINE | Admitting: EMERGENCY MEDICINE
Payer: COMMERCIAL

## 2018-10-04 VITALS
OXYGEN SATURATION: 95 % | TEMPERATURE: 98.7 F | BODY MASS INDEX: 26.17 KG/M2 | RESPIRATION RATE: 18 BRPM | HEART RATE: 114 BPM | SYSTOLIC BLOOD PRESSURE: 115 MMHG | WEIGHT: 147.71 LBS | DIASTOLIC BLOOD PRESSURE: 68 MMHG

## 2018-10-04 DIAGNOSIS — F32.A DEPRESSION: Primary | ICD-10-CM

## 2018-10-04 LAB
ETHANOL EXG-MCNC: 0.11 MG/DL
ETHANOL EXG-MCNC: 0.11 MG/DL

## 2018-10-04 PROCEDURE — 82075 ASSAY OF BREATH ETHANOL: CPT | Performed by: EMERGENCY MEDICINE

## 2018-10-04 PROCEDURE — 99284 EMERGENCY DEPT VISIT MOD MDM: CPT

## 2018-10-04 PROCEDURE — 82075 ASSAY OF BREATH ETHANOL: CPT
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
## 2018-10-04 NOTE — DISCHARGE INSTRUCTIONS
Pt is cleared medically for incarceration      Depression   WHAT YOU NEED TO KNOW:   Depression is a medical condition that causes feelings of sadness or hopelessness that do not go away  Depression may cause you to lose interest in things you used to enjoy  These feelings may interfere with your daily life  DISCHARGE INSTRUCTIONS:   Call 911 for any of the following:   · You think about harming yourself or someone else  Contact your healthcare provider if:   · Your symptoms do not improve  · You cannot make it to your next appointment  · You have new symptoms  · You have questions or concerns about your condition or care  Medicines:   · Antidepressants  may be given to improve or balance your mood  You may need to take this medicine for several weeks before you begin to feel better  · Take your medicine as directed  Contact your healthcare provider if you think your medicine is not helping or if you have side effects  Tell him of her if you are allergic to any medicine  Keep a list of the medicines, vitamins, and herbs you take  Include the amounts, and when and why you take them  Bring the list or the pill bottles to follow-up visits  Carry your medicine list with you in case of an emergency  Therapy  may be used to treat your depression  A therapist will help you learn to cope with your thoughts and feelings  This can be done alone or in a group  It may also be done with family members or a significant other  Self-care:   · Get regular physical activity  Try to exercise for 30 minutes, 3 to 5 days a week  Work with your healthcare provider to develop an exercise plan that you enjoy  Physical activity may improve your symptoms  · Get enough sleep  Create a routine to help you relax before bed  You can listen to music, read, or do yoga  Try to go to bed and wake up at the same time every day  Sleep is important for emotional health       · Eat a variety of healthy foods from all of the food groups  A healthy meal plan is low in fat, salt, and added sugar  Ask your healthcare provider for more information about a meal plan that is right for you  · Do not drink alcohol or use drugs  Alcohol and drugs can make your symptoms worse  Follow up with your healthcare provider as directed: Your healthcare provider will monitor your progress at follow-up visits  He or she will also monitor your medicine if you take antidepressants  Your healthcare provider will ask if the medicine is helping  Tell him or her about any side effects or problems you may have with your medicine  The type or amount of medicine may need to be changed  Write down your questions so you remember to ask them during your visits  © 2017 [facility] Information is for End User's use only and may not be sold, redistributed or otherwise used for commercial purposes  All illustrations and images included in CareNotes® are the copyrighted property of [employer]  or [employer]  The above information is an  only  It is not intended as medical advice for individual conditions or treatments  Talk to your doctor, nurse or pharmacist before following any medical regimen to see if it is safe and effective for you
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
## 2018-10-04 NOTE — ED NOTES
Pt belongings are in Zone 5  Shelf C - Med Room  One bag - labeled     Fransico Akhil  10/04/18 1135

## 2019-04-02 ENCOUNTER — OFFICE VISIT (OUTPATIENT)
Dept: OBGYN CLINIC | Facility: CLINIC | Age: 24
End: 2019-04-02
Payer: COMMERCIAL

## 2019-04-02 VITALS
WEIGHT: 144 LBS | SYSTOLIC BLOOD PRESSURE: 100 MMHG | HEIGHT: 63 IN | BODY MASS INDEX: 25.52 KG/M2 | DIASTOLIC BLOOD PRESSURE: 60 MMHG

## 2019-04-02 DIAGNOSIS — R10.2 VAGINAL PAIN: ICD-10-CM

## 2019-04-02 DIAGNOSIS — N94.10 DYSPAREUNIA IN FEMALE: ICD-10-CM

## 2019-04-02 DIAGNOSIS — R10.2 PELVIC PAIN: Primary | ICD-10-CM

## 2019-04-02 PROBLEM — N30.01 ACUTE CYSTITIS WITH HEMATURIA: Status: RESOLVED | Noted: 2018-06-21 | Resolved: 2019-04-02

## 2019-04-02 PROBLEM — Z91.89 BREASTFEEDING PROBLEM: Status: RESOLVED | Noted: 2018-04-11 | Resolved: 2019-04-02

## 2019-04-02 PROCEDURE — 99213 OFFICE O/P EST LOW 20 MIN: CPT | Performed by: NURSE PRACTITIONER

## 2019-04-03 ENCOUNTER — OFFICE VISIT (OUTPATIENT)
Dept: BEHAVIORAL/MENTAL HEALTH CLINIC | Facility: CLINIC | Age: 24
End: 2019-04-03
Payer: COMMERCIAL

## 2019-04-03 DIAGNOSIS — F41.9 ANXIETY: Primary | ICD-10-CM

## 2019-04-03 PROCEDURE — 90791 PSYCH DIAGNOSTIC EVALUATION: CPT | Performed by: SOCIAL WORKER

## 2019-04-04 LAB
DEPRECATED C TRACH RRNA XXX QL PRB: NOT DETECTED
N GONORRHOEA DNA UR QL NAA+PROBE: NOT DETECTED

## 2019-04-05 LAB — SL AMB GENITAL CULTURE: NORMAL

## 2019-04-08 ENCOUNTER — OFFICE VISIT (OUTPATIENT)
Dept: URGENT CARE | Age: 24
End: 2019-04-08
Payer: COMMERCIAL

## 2019-04-08 VITALS
OXYGEN SATURATION: 98 % | SYSTOLIC BLOOD PRESSURE: 117 MMHG | BODY MASS INDEX: 23.9 KG/M2 | RESPIRATION RATE: 18 BRPM | HEART RATE: 91 BPM | WEIGHT: 140 LBS | HEIGHT: 64 IN | TEMPERATURE: 97.8 F | DIASTOLIC BLOOD PRESSURE: 70 MMHG

## 2019-04-08 DIAGNOSIS — R00.2 PALPITATIONS: Primary | ICD-10-CM

## 2019-04-08 DIAGNOSIS — R42 DIZZINESS AND GIDDINESS: ICD-10-CM

## 2019-04-08 LAB
ATRIAL RATE: 81 BPM
P AXIS: 60 DEGREES
PR INTERVAL: 154 MS
QRS AXIS: 59 DEGREES
QRSD INTERVAL: 78 MS
QT INTERVAL: 356 MS
QTC INTERVAL: 413 MS
T WAVE AXIS: 43 DEGREES
VENTRICULAR RATE: 81 BPM

## 2019-04-08 PROCEDURE — 93005 ELECTROCARDIOGRAM TRACING: CPT | Performed by: NURSE PRACTITIONER

## 2019-04-08 PROCEDURE — 99213 OFFICE O/P EST LOW 20 MIN: CPT | Performed by: FAMILY MEDICINE

## 2019-04-08 PROCEDURE — 93010 ELECTROCARDIOGRAM REPORT: CPT | Performed by: INTERNAL MEDICINE

## 2019-04-08 RX ORDER — AMOXICILLIN AND CLAVULANATE POTASSIUM 875; 125 MG/1; MG/1
1 TABLET, FILM COATED ORAL EVERY 12 HOURS SCHEDULED
COMMUNITY
End: 2019-09-25

## 2019-05-01 ENCOUNTER — EVALUATION (OUTPATIENT)
Dept: PHYSICAL THERAPY | Facility: REHABILITATION | Age: 24
End: 2019-05-01
Payer: COMMERCIAL

## 2019-05-01 DIAGNOSIS — R10.2 PELVIC PAIN: ICD-10-CM

## 2019-05-01 PROCEDURE — 97530 THERAPEUTIC ACTIVITIES: CPT | Performed by: PHYSICAL THERAPIST

## 2019-05-01 PROCEDURE — 97162 PT EVAL MOD COMPLEX 30 MIN: CPT | Performed by: PHYSICAL THERAPIST

## 2019-05-08 ENCOUNTER — OFFICE VISIT (OUTPATIENT)
Dept: PHYSICAL THERAPY | Facility: REHABILITATION | Age: 24
End: 2019-05-08
Payer: COMMERCIAL

## 2019-05-08 DIAGNOSIS — R10.2 PELVIC PAIN: Primary | ICD-10-CM

## 2019-05-08 PROCEDURE — 97140 MANUAL THERAPY 1/> REGIONS: CPT | Performed by: PHYSICAL THERAPIST

## 2019-05-08 PROCEDURE — 97112 NEUROMUSCULAR REEDUCATION: CPT | Performed by: PHYSICAL THERAPIST

## 2019-05-20 ENCOUNTER — TELEPHONE (OUTPATIENT)
Dept: BEHAVIORAL/MENTAL HEALTH CLINIC | Facility: CLINIC | Age: 24
End: 2019-05-20

## 2019-05-20 ENCOUNTER — DOCUMENTATION (OUTPATIENT)
Dept: BEHAVIORAL/MENTAL HEALTH CLINIC | Facility: CLINIC | Age: 24
End: 2019-05-20

## 2019-06-03 ENCOUNTER — DOCUMENTATION (OUTPATIENT)
Dept: BEHAVIORAL/MENTAL HEALTH CLINIC | Facility: CLINIC | Age: 24
End: 2019-06-03

## 2019-06-03 ENCOUNTER — TELEPHONE (OUTPATIENT)
Dept: BEHAVIORAL/MENTAL HEALTH CLINIC | Facility: CLINIC | Age: 24
End: 2019-06-03

## 2019-06-11 ENCOUNTER — DOCUMENTATION (OUTPATIENT)
Dept: BEHAVIORAL/MENTAL HEALTH CLINIC | Facility: CLINIC | Age: 24
End: 2019-06-11

## 2019-07-15 ENCOUNTER — TELEPHONE (OUTPATIENT)
Dept: OBGYN CLINIC | Facility: CLINIC | Age: 24
End: 2019-07-15

## 2019-07-17 ENCOUNTER — OFFICE VISIT (OUTPATIENT)
Dept: OBGYN CLINIC | Facility: CLINIC | Age: 24
End: 2019-07-17
Payer: COMMERCIAL

## 2019-07-17 VITALS
WEIGHT: 144 LBS | DIASTOLIC BLOOD PRESSURE: 70 MMHG | SYSTOLIC BLOOD PRESSURE: 108 MMHG | BODY MASS INDEX: 25.52 KG/M2 | HEIGHT: 63 IN

## 2019-07-17 DIAGNOSIS — N64.4 MASTODYNIA: Primary | ICD-10-CM

## 2019-07-17 PROCEDURE — 99214 OFFICE O/P EST MOD 30 MIN: CPT | Performed by: PHYSICIAN ASSISTANT

## 2019-07-17 NOTE — PROGRESS NOTES
Marylin Kimball is an 30 y o  female who presents for evaluation of a breast mass  Change was noted several week ago, and has been unchanged since first identified  Patient does routinely do self breast exams  Patient denies nipple discharge  Breast cancer risk factors include: family hx on mother's side  The following portions of the patient's history were reviewed and updated as appropriate: allergies, current medications, past family history, past medical history, past social history, past surgical history and problem list     Review of Systems  Pertinent items are noted in HPI       Objective     /70 (BP Location: Left arm, Patient Position: Sitting, Cuff Size: Standard)   Ht 5' 3" (1 6 m)   Wt 65 3 kg (144 lb)   BMI 25 51 kg/m²     General Appearance:    Alert, cooperative, no distress, appears stated age   Head:    Normocephalic, without obvious abnormality, atraumatic   Eyes:    PERRL, conjunctiva/corneas clear, EOM's intact, fundi     benign, both eyes   Ears:    Normal TM's and external ear canals, both ears   Nose:   Nares normal, septum midline, mucosa normal, no drainage    or sinus tenderness   Throat:   Lips, mucosa, and tongue normal; teeth and gums normal   Neck:   Supple, symmetrical, trachea midline, no adenopathy;     thyroid:  no enlargement/tenderness/nodules; no carotid    bruit or JVD   Back:     Symmetric, no curvature, ROM normal, no CVA tenderness   Lungs:     Clear to auscultation bilaterally, respirations unlabored   Chest Wall:    No tenderness or deformity    Heart:    Regular rate and rhythm, S1 and S2 normal, no murmur, rub   or gallop   Breast Exam:    No tenderness, masses, or nipple abnormality   Abdomen:     Soft, non-tender, bowel sounds active all four quadrants,     no masses, no organomegaly   Genitalia:    Normal female without lesion, discharge or tenderness   Rectal:    Normal tone, no masses or tenderness; guaiac negative stool   Extremities: Extremities normal, atraumatic, no cyanosis or edema   Pulses:   2+ and symmetric all extremities   Skin:   Skin color, texture, turgor normal, no rashes or lesions   Lymph nodes:   Cervical, supraclavicular, and axillary nodes normal   Neurologic:   CNII-XII intact, normal strength, sensation and reflexes     throughout     Assessment/Plan     mastalgia, likely due to fibrocystic changes  Discussed fibrocystic disease and alleviating measures   Assessment/Plan:    Breast pain  Advised to plan breast US  Advised vitamin E as well as evening primrose oil  Advised avoidance of caffeine, alcohol and tobacco         Diagnoses and all orders for this visit:    Kuuse 53 breast right limited (diagnostic); Future          Subjective:      Patient ID: Tommy Velez is a 30 y o  female  Pt with complaints of breast pain  Began with pain in her right breast, right side  Hurts more in the morning  Began aprox 1 mth ago  No changes in the past month  No changes in relation to periods, pt has No been on this [facility] for awhile  No changes in diet or exercise habits  No caffeine or alcohol  No lumps noted  No redness  M aunt w breast cancer- at 55, diagnosed 36 y/o  M 2nd cousin  age 34 due to colon ca  The following portions of the patient's history were reviewed and updated as appropriate: allergies, current medications, past family history, past medical history, past social history, past surgical history and problem list     Review of Systems   Constitutional: Negative  Respiratory: Negative  Genitourinary: Negative  Objective:      /70 (BP Location: Left arm, Patient Position: Sitting, Cuff Size: Standard)   Ht 5' 3" (1 6 m)   Wt 65 3 kg (144 lb)   BMI 25 51 kg/m²          Physical Exam   Constitutional: She is oriented to person, place, and time  She appears well-developed and well-nourished  Cardiovascular: Normal rate and regular rhythm  Pulmonary/Chest: Effort normal and breath sounds normal  Right breast exhibits tenderness  Right breast exhibits no inverted nipple, no mass, no nipple discharge and no skin change  Left breast exhibits no inverted nipple, no mass, no nipple discharge and no skin change  There is breast tenderness  No breast swelling, discharge or bleeding  Breasts are symmetrical        Neurological: She is alert and oriented to person, place, and time  Skin: Skin is warm and dry  Patient Instructions   Advised to plan breast US  Advised vitamin E as well as evening primrose oil     Advised avoidance of caffeine, alcohol and tobacco

## 2019-07-17 NOTE — PATIENT INSTRUCTIONS
Advised to plan breast US  Advised vitamin E as well as evening primrose oil     Advised avoidance of caffeine, alcohol and tobacco

## 2019-07-18 ENCOUNTER — HOSPITAL ENCOUNTER (OUTPATIENT)
Dept: ULTRASOUND IMAGING | Facility: CLINIC | Age: 24
Discharge: HOME | End: 2019-07-18
Payer: COMMERCIAL

## 2019-07-18 VITALS — WEIGHT: 144 LBS | BODY MASS INDEX: 25.52 KG/M2 | HEIGHT: 63 IN

## 2019-07-18 DIAGNOSIS — N64.4 MASTODYNIA: ICD-10-CM

## 2019-07-18 PROCEDURE — 76642 ULTRASOUND BREAST LIMITED: CPT

## 2019-07-22 DIAGNOSIS — N60.41 DUCT ECTASIA OF BREAST, RIGHT: Primary | ICD-10-CM

## 2019-07-23 DIAGNOSIS — Z30.9 ENCOUNTER FOR CONTRACEPTIVE MANAGEMENT, UNSPECIFIED TYPE: ICD-10-CM

## 2019-07-23 RX ORDER — NORELGESTROMIN AND ETHINYL ESTRADIOL 150; 35 UG/D; UG/D
PATCH TRANSDERMAL
Qty: 3 PATCH | Refills: 1 | Status: SHIPPED | OUTPATIENT
Start: 2019-07-23 | End: 2019-09-25 | Stop reason: SDUPTHER

## 2019-09-09 PROBLEM — Z01.419 WELL WOMAN EXAM: Status: ACTIVE | Noted: 2019-09-09

## 2019-09-09 PROBLEM — B37.3 YEAST VAGINITIS: Status: RESOLVED | Noted: 2017-09-18 | Resolved: 2019-09-09

## 2019-09-09 PROBLEM — B37.31 YEAST VAGINITIS: Status: RESOLVED | Noted: 2017-09-18 | Resolved: 2019-09-09

## 2019-09-25 ENCOUNTER — ANNUAL EXAM (OUTPATIENT)
Dept: OBGYN CLINIC | Facility: CLINIC | Age: 24
End: 2019-09-25
Payer: COMMERCIAL

## 2019-09-25 VITALS
HEIGHT: 63 IN | BODY MASS INDEX: 25.62 KG/M2 | DIASTOLIC BLOOD PRESSURE: 66 MMHG | SYSTOLIC BLOOD PRESSURE: 104 MMHG | WEIGHT: 144.6 LBS

## 2019-09-25 DIAGNOSIS — E01.0 THYROMEGALY: ICD-10-CM

## 2019-09-25 DIAGNOSIS — Z30.9 ENCOUNTER FOR CONTRACEPTIVE MANAGEMENT, UNSPECIFIED TYPE: ICD-10-CM

## 2019-09-25 DIAGNOSIS — N64.4 BREAST PAIN, RIGHT: ICD-10-CM

## 2019-09-25 DIAGNOSIS — Z01.419 ENCOUNTER FOR GYNECOLOGICAL EXAMINATION (GENERAL) (ROUTINE) WITHOUT ABNORMAL FINDINGS: Primary | ICD-10-CM

## 2019-09-25 DIAGNOSIS — N64.52 NIPPLE DISCHARGE IN FEMALE: ICD-10-CM

## 2019-09-25 PROCEDURE — 99395 PREV VISIT EST AGE 18-39: CPT | Performed by: PHYSICIAN ASSISTANT

## 2019-09-25 PROCEDURE — G0145 SCR C/V CYTO,THINLAYER,RESCR: HCPCS | Performed by: PHYSICIAN ASSISTANT

## 2019-09-25 NOTE — PROGRESS NOTES
Assessment/Plan   Diagnoses and all orders for this visit:    Encounter for gynecological examination (general) (routine) without abnormal findings  -     Liquid-based pap, screening  The current ASCCP guidelines were reviewed  Patient's last pap was 8/2/17 - WNL and therefore, a pap is not indicated at this time  I emphasized the importance of an annual pelvic and breast exam  Patient opts to have a pap done today  Breast pain, right  -     US breast left limited (diagnostic); Future  -     US breast right limited (diagnostic); Future  Nipple discharge in female  -     TSH, 3rd generation; Future  -     T4, free; Future  -     Prolactin; Future  -     hCG, quantitative; Future  -     US breast left limited (diagnostic); Future  -     US breast right limited (diagnostic); Future  Considering new symptoms of B/L nipple discharge and persistent tenderness of right breast - will plan follow-up B/L breast ultrasounds at this time along with labs ordered  Thyromegaly  -     TSH, 3rd generation; Future  -     T4, free; Future  -     US thyroid; Future  Reviewed enlarged thyroid on exam today - will plan thyroid studies and an ultrasound to evaluate    Encounter for contraceptive management, unspecified type  -     norelgestromin-ethinyl estradiol Kingsbury Negus) 150-35 MCG/24HR; Place 1 patch on the skin once a week x 6 weeks, then no patch on week 7 for menstrual migraines  Reviewed taking the patch continuously to try to minimize menstrual migraines  Will plan a period every other month  Can also consider trial of alternate [facility] options as well  Patient to give a fair trial and call with any further questions/concerns  HPV vaccine counseling  The patient has not had the Gardasil vaccine series, which is recommended for patients from 545 years of age   Strongly encourage; pt to check with insurance for coverage and call if desires    Discussion  I have discussed the importance of monthly self-breast exams, exercise and healthy diet as well as adequate intake of calcium and vitamin D  Encourage MVI q day and r/ancelmo importance of folic acid; Encourage 30-40 min weight bearing exercise most days of week  Encourage safe sexual practices; STD testing - declines  All questions have been answered to her satisfaction  RTO for APE or sooner if needed    Subjective     HPI   Deejay Garcia is a 30 y o  female who presents for annual well woman exam    Menarche - 10; LMP - 9/10/19; Periods are reg q month and last 5-7 days; Last month did get period in middle of month which was out of the ordinary; No excessive bleeding; No intermenstrual bleeding or spotting; Cramps are tolerable  (+) menstrual migraine - day 2 of cycle for the past 5 months - takes one excedrin which helps after a few hours  No vulvar itch/burn; No vaginal itch/burn; No abn discharge or odor; No urinary sx - burning/pain/frequency/hematuria  (+) SBEs - no breast masses, asymmetry, changes in skin of breast; After most recent period - she experienced milky discharge from B/L nipples that lasted 2 days - noticed when came out of the shower  Still gets occasional right breast pain - upper outer quadrant for which she is due for a follow-up ultrasound in January- typically tenderness in the AM - not everyday, comes and goes - does not associate with period; 7/18/19- Right breast ultrasound for mastodynia - minimal ductal ectasia - recommend 6 month follow-up breast ultrasound; (+) nipple discharge - B/L  No abd/pelvic pain or HAs;   Pt is sexually active in a mutually monog sexual relationship x 5 yrs; No issues with intercourse; She declines std/hiv/hep testing; Feels safe at home  Current contraception: OE patch  Condom use: no  Gardasil - no  (+) PCP for routine Bw/care;     Last Pap - 8/2/17 - WNL  History of abnormal Pap smear: no  Last STD screen - 4/2/19 - C/G and genital culture WNL    Review of Systems   Constitutional: Negative for activity change, fatigue, fever and unexpected weight change  HENT: Negative for congestion, dental problem, sinus pressure and sinus pain  Eyes: Negative for visual disturbance  Respiratory: Negative for cough, shortness of breath and wheezing  Cardiovascular: Negative for chest pain and leg swelling  Gastrointestinal: Negative for abdominal distention, abdominal pain, blood in stool, constipation, diarrhea, nausea and vomiting  Endocrine: Negative for polydipsia  Genitourinary: Negative for difficulty urinating, dyspareunia, dysuria, frequency, hematuria, menstrual problem, pelvic pain, urgency, vaginal bleeding, vaginal discharge and vaginal pain  Musculoskeletal: Negative for arthralgias and back pain  Allergic/Immunologic: Negative for environmental allergies  Neurological: Positive for headaches (menstrual migraine the past 5 months)  Negative for dizziness and seizures  Psychiatric/Behavioral: Negative for dysphoric mood and sleep disturbance  The patient is not nervous/anxious          The following portions of the patient's history were reviewed and updated as appropriate: allergies, current medications, past family history, past medical history, past social history, past surgical history and problem list          OB History        3    Para   2    Term   2            AB   1    Living   2       SAB        TAB        Ectopic        Multiple   0    Live Births   2           Obstetric Comments   :   M;   F             Past Medical History:   Diagnosis Date    Anxiety     last assessed - 2018    Chest pain     last assessed - [id number]    Continuous RUQ abdominal pain     last assessed - [id number]    External hemorrhoid     last assessed - [id number]    Finger injury     last assessed - [id number]    Varicella     childhood    Yeast vaginitis     last assessed -        Past Surgical History:   Procedure Laterality Date    DENTAL SURGERY      INDUCED       WISDOM TOOTH EXTRACTION         Family History   Problem Relation Age of Onset    No Known Problems Maternal Aunt     No Known Problems Mother     Colon cancer Cousin     Diabetes Other     No Known Problems Maternal Aunt     No Known Problems Father     No Known Problems Sister     No Known Problems Daughter     No Known Problems Maternal Grandmother     No Known Problems Maternal Grandfather     No Known Problems Paternal Grandmother     No Known Problems Paternal Grandfather     No Known Problems Paternal Aunt     No Known Problems Paternal Aunt     No Known Problems Paternal Aunt        Social History     Socioeconomic History    Marital status: Single     Spouse name: Not on file    Number of children: Not on file    Years of education: Not on file    Highest education level: Not on file   Occupational History    Not on file   Social Needs    Financial resource strain: Not on file    Food insecurity:     Worry: Not on file     Inability: Not on file    Transportation needs:     Medical: Not on file     Non-medical: Not on file   Tobacco Use    Smoking status: Current Every Day Smoker     Last attempt to quit: 2017     Years since quittin 2    Smokeless tobacco: Never Used    Tobacco comment: 5 cigs - working on quitting   Substance and Sexual Activity    Alcohol use: No     Comment: Social alcohol use    Drug use: No    Sexual activity: Yes     Partners: Male     Birth control/protection: Patch   Lifestyle    Physical activity:     Days per week: Not on file     Minutes per session: Not on file    Stress: Not on file   Relationships    Social connections:     Talks on phone: Not on file     Gets together: Not on file     Attends [employer] service: Not on file     Active member of club or organization: Not on file     Attends meetings of clubs or organizations: Not on file     Relationship status: Not on file    Intimate partner violence:     Fear of current or ex partner: Not on file     Emotionally abused: Not on file     Physically abused: Not on file     Forced sexual activity: Not on file   Other Topics Concern    Not on file   Social History Narrative    Not on file         Current Outpatient Medications:     norelgestromin-ethinyl estradiol Ozell Sour) 150-35 MCG/24HR, Place 1 patch on the skin once a week x 6 weeks, then no patch on week 7 for menstrual migraines, Disp: 3 patch, Rfl: 11    No Known Allergies    Objective   Vitals:    09/25/19 1057   BP: 104/66   BP Location: Left arm   Patient Position: Sitting   Cuff Size: Standard   Weight: 65 6 kg (144 lb 9 6 oz)   Height: 5' 3" (1 6 m)     Physical Exam   Constitutional: She appears well-developed and well-nourished  No distress  Neck: Thyromegaly present  Cardiovascular: Normal rate, regular rhythm and normal heart sounds  No murmur heard  Pulmonary/Chest: Effort normal and breath sounds normal  No respiratory distress  She has no wheezes  Right breast exhibits no inverted nipple, no mass, no nipple discharge, no skin change and no tenderness  Left breast exhibits no inverted nipple, no mass, no nipple discharge, no skin change and no tenderness  Breasts are symmetrical    Increase density appreciated in upper outer quadrant of the right breast at sight of tenderness/discomfort - no mass or lumps   Abdominal: Soft  She exhibits no distension and no mass  There is no tenderness  Genitourinary: Vagina normal and uterus normal  Pelvic exam was performed with patient supine  There is no rash, tenderness or lesion on the right labia  There is no rash, tenderness or lesion on the left labia  Uterus is not deviated, not enlarged, not fixed and not tender  Cervix exhibits no motion tenderness, no discharge and no friability  Right adnexum displays no mass, no tenderness and no fullness  Left adnexum displays no mass, no tenderness and no fullness  No erythema, tenderness or bleeding in the vagina   No foreign body in the vagina  No vaginal discharge found  Musculoskeletal: She exhibits no edema  Lymphadenopathy:     She has no cervical adenopathy  She has no axillary adenopathy  Right: No inguinal adenopathy present  Left: No inguinal adenopathy present  Neurological: She is alert  Skin: Skin is warm  She is not diaphoretic  Psychiatric: She has a normal mood and affect  Her behavior is normal    Vitals reviewed

## 2019-09-25 NOTE — ASSESSMENT & PLAN NOTE
The current ASCCP guidelines were reviewed  Patient's last pap was 8/2/17 - WNL and therefore, a pap is not indicated at this time  I emphasized the importance of an annual pelvic and breast exam  Patient opts to have a pap done today

## 2019-09-26 LAB
B-HCG SERPL-ACNC: <2 MIU/ML
PROLACTIN SERPL-MCNC: 6.8 NG/ML
TSH SERPL-ACNC: 1.6 MIU/L

## 2019-09-27 ENCOUNTER — HOSPITAL ENCOUNTER (OUTPATIENT)
Dept: RADIOLOGY | Age: 24
Discharge: HOME | End: 2019-09-27
Payer: COMMERCIAL

## 2019-09-27 DIAGNOSIS — E01.0 THYROMEGALY: ICD-10-CM

## 2019-09-27 PROCEDURE — 76536 US EXAM OF HEAD AND NECK: CPT

## 2019-10-01 LAB
LAB AP GYN PRIMARY INTERPRETATION: NORMAL
Lab: NORMAL

## 2019-10-02 ENCOUNTER — APPOINTMENT (OUTPATIENT)
Dept: ULTRASOUND IMAGING | Facility: CLINIC | Age: 24
End: 2019-10-02
Payer: COMMERCIAL

## 2019-10-02 ENCOUNTER — HOSPITAL ENCOUNTER (OUTPATIENT)
Dept: ULTRASOUND IMAGING | Facility: CLINIC | Age: 24
Discharge: HOME | End: 2019-10-02
Payer: COMMERCIAL

## 2019-10-02 VITALS — HEIGHT: 63 IN | BODY MASS INDEX: 25.52 KG/M2 | WEIGHT: 144 LBS

## 2019-10-02 DIAGNOSIS — N64.4 BREAST PAIN: Primary | ICD-10-CM

## 2019-10-02 DIAGNOSIS — N64.4 BREAST PAIN, RIGHT: ICD-10-CM

## 2019-10-02 DIAGNOSIS — R92.8 ABNORMAL ULTRASOUND OF BREAST: ICD-10-CM

## 2019-10-02 DIAGNOSIS — N64.52 NIPPLE DISCHARGE IN FEMALE: ICD-10-CM

## 2019-10-02 PROCEDURE — 76642 ULTRASOUND BREAST LIMITED: CPT

## 2019-10-02 NOTE — PROGRESS NOTES
Right breast pain, abnormal breast ultrasound and B/L nipple discharge - referred to breast specialist

## 2019-10-03 ENCOUNTER — TELEPHONE (OUTPATIENT)
Dept: HEMATOLOGY ONCOLOGY | Facility: CLINIC | Age: 24
End: 2019-10-03

## 2019-10-03 NOTE — TELEPHONE ENCOUNTER
New Patient Encounter    New Patient Intake Form   Patient Details:  Marcel Sierra  1995  [id number]    Background Information:  [address] starts by opening a telephone encounter and gathering the following information   Who is calling to schedule? If not self, relationship to patient? Self    Referring Provider Valarie Lei   What is the diagnosis? Abn u/s of right breast   When was the diagnosis? Last week of Sept    Is patient aware of diagnosis? Yes   Reason for visit? NP DX   Have you had any testing done? If so: when, where? Yes   Are records in [facility]? yes   Was the patient told to bring a disk? no   Scheduling Information:   Preferred [place]:  [place]     Requesting Specific Provider? Ellyn Kocher   Are there any dates/time the patient cannot be seen? no   Counseling Pre-Screen:  If the patient answers YES to any of the below questions, please route to the appropriate location specific counselor    Have you felt anxious or worried about cancer and the treatment you are receiving? Yes   Has your diagnosis caused physical, emotional, or financial hardship for you? No   Note: Do not ask the patient about transportation issues/needs  Please notate if the patient brings it up and the counselor will schedule accordingly  Miscellaneous: n/a   After completing the above information, please route to Financial Counselor and the appropriate Nurse Navigator for review

## 2019-10-04 NOTE — TELEPHONE ENCOUNTER
Thank you for the referral; I have forwarded this to our Breast Coordinator, Dayna Pedroza for review  I have also forwarded to one of the Breast RN navigators, Everardo Galarza

## 2019-10-04 NOTE — TELEPHONE ENCOUNTER
Thank you  Please note that I begin navigating patients from time of confirmed malignancy  Please let me know if I can help   Thanks, Susan Sellers

## 2019-10-18 PROBLEM — Z01.419 WELL WOMAN EXAM: Status: RESOLVED | Noted: 2019-09-09 | Resolved: 2019-10-18

## 2019-10-18 PROBLEM — N64.52 NIPPLE DISCHARGE IN FEMALE: Status: ACTIVE | Noted: 2019-10-18

## 2019-10-23 ENCOUNTER — TELEPHONE (OUTPATIENT)
Dept: SURGICAL ONCOLOGY | Facility: CLINIC | Age: 24
End: 2019-10-23

## 2019-10-23 ENCOUNTER — CONSULT (OUTPATIENT)
Dept: SURGICAL ONCOLOGY | Facility: CLINIC | Age: 24
End: 2019-10-23
Payer: COMMERCIAL

## 2019-10-23 VITALS
HEART RATE: 82 BPM | HEIGHT: 63 IN | WEIGHT: 150 LBS | DIASTOLIC BLOOD PRESSURE: 70 MMHG | TEMPERATURE: 98 F | SYSTOLIC BLOOD PRESSURE: 110 MMHG | RESPIRATION RATE: 16 BRPM | BODY MASS INDEX: 26.58 KG/M2

## 2019-10-23 DIAGNOSIS — N64.52 NIPPLE DISCHARGE IN FEMALE: ICD-10-CM

## 2019-10-23 DIAGNOSIS — R92.8 ABNORMAL FINDING ON BREAST IMAGING: ICD-10-CM

## 2019-10-23 DIAGNOSIS — N64.4 MASTODYNIA OF RIGHT BREAST: Primary | ICD-10-CM

## 2019-10-23 PROCEDURE — 99243 OFF/OP CNSLTJ NEW/EST LOW 30: CPT | Performed by: SURGERY

## 2019-10-23 RX ORDER — LORAZEPAM 0.5 MG/1
TABLET ORAL EVERY 8 HOURS PRN
COMMUNITY
End: 2022-06-26

## 2019-10-23 NOTE — PROGRESS NOTES
Surgical Oncology Consult Note       [address]  [facility]  [address] [facility]  [facility] [address]    Yulisa Mast  1995  [id number]  [address] [facility]  [facility]  [address] [address]      Chief Complaint:     Chief Complaint   Patient presents with    Consult     Breast pain       Assessment and Plan:   Assessment/Plan   The patient presents with a new diagnosis of intermittent right nipple discharge as well as right mastodynia  She has had an ultrasound which demonstrated a relatively benign-appearing but carious lesion and they have recommended a six-month follow-up ultrasound  This is currently scheduled  Clinical exam demonstrates slightly increased parenchymal tissue in the upper outer quadrant right breast with the patient has pain  I have recommended evening Primrose oil and six-month follow-up  She is agreeable to see our advanced practitioner at that time  Oncology History:      No history exists  History of Present Illness: This is a 49-year-old woman who states that she has had nipple discharge on the right side which is been off and on is occurred approximately 3 times in the last time was just before she had her ultrasound  She has also complaining of right breast pain particular in the upper outer quadrant  There are no precipitating or alleviating factors  She would rate it is a scale of 5/10  She presents now for an opinion regarding further management  Review of Systems:   Review of Systems   All other systems reviewed and are negative        Past Medical History:      Patient Active Problem List   Diagnosis    Anxiety    Postpartum depression    Pelvic pain    Mastodynia of right breast    Encounter for gynecological examination (general) (routine) without abnormal findings    Nipple discharge in female        Past Medical History: Diagnosis Date    Anxiety     last assessed - [id number]   Emaline Folds Chest pain     last assessed - [id number]    Continuous RUQ abdominal pain     last assessed - [id number]   Emaline Folds External hemorrhoid     last assessed - [id number]    Finger injury     last assessed - [id number]    Varicella     childhood    Yeast vaginitis     last assessed - [id number]        Past Surgical History:   Procedure Laterality Date    DENTAL SURGERY      INDUCED       WISDOM TOOTH EXTRACTION          Family History   Problem Relation Age of Onset    No Known Problems Maternal Aunt     No Known Problems Mother     Colon cancer Cousin     Diabetes Other     No Known Problems Maternal Aunt     No Known Problems Father     No Known Problems Sister     No Known Problems Daughter     No Known Problems Maternal Grandmother     No Known Problems Maternal Grandfather     No Known Problems Paternal Grandmother     No Known Problems Paternal Grandfather     No Known Problems Paternal Aunt     No Known Problems Paternal Aunt     No Known Problems Paternal Aunt         Social History     Socioeconomic History    Marital status: Single     Spouse name: Not on file    Number of children: Not on file    Years of education: Not on file    Highest education level: Not on file   Occupational History    Not on file   Social Needs    Financial resource strain: Not on file    Food insecurity:     Worry: Not on file     Inability: Not on file    Transportation needs:     Medical: Not on file     Non-medical: Not on file   Tobacco Use    Smoking status: Former Smoker     Types: Cigarettes     Last attempt to quit: 10/1/2019     Years since quittin 0    Smokeless tobacco: Never Used   Substance and Sexual Activity    Alcohol use:  Yes     Alcohol/week: 1 0 standard drinks     Types: 1 Standard drinks or equivalent per week    Drug use: No    Sexual activity: Yes     Partners: Male     Birth control/protection: Patch   Lifestyle  Physical activity:     Days per week: Not on file     Minutes per session: Not on file    Stress: Not on file   Relationships    Social connections:     Talks on phone: Not on file     Gets together: Not on file     Attends Islam service: Not on file     Active member of club or organization: Not on file     Attends meetings of clubs or organizations: Not on file     Relationship status: Not on file    Intimate partner violence:     Fear of current or ex partner: Not on file     Emotionally abused: Not on file     Physically abused: Not on file     Forced sexual activity: Not on file   Other Topics Concern    Not on file   Social History Narrative    Not on file        Current Outpatient Medications:     LORazepam (ATIVAN) 0 5 mg tablet, Take by mouth every 8 (eight) hours as needed for anxiety, Disp: , Rfl:     norelgestromin-ethinyl estradiol Nikky Va) 150-35 MCG/24HR, Place 1 patch on the skin once a week x 6 weeks, then no patch on week 7 for menstrual migraines (Patient not taking: Reported on 10/2/2019), Disp: 3 patch, Rfl: 11   No Known Allergies    Physical Exam:     Vitals:    10/23/19 1442   BP: 110/70   Pulse: 82   Resp: 16   Temp: 98 °F (36 7 °C)     Physical Exam   Pulmonary/Chest:   Examination of the left breast in the sitting and supine position demonstrate no skin changes nipple discharge dominant masses or axillary adenopathy  Examination of the right breast demonstrates no skin changes no nipple discharge no dominant masses  The patient does have slightly increased parenchymal tissue in the area outlined on the diagram this is mildly tender to compression  Results:   I reviewed her ultrasound images as well as videos  I concur with Dr Fercho De La Garza is interpretation  The etiology of this imaging abnormality remains uncertain  I concur with his recommendation for six-month follow-up      Discussion/Summary:   The patient's area of concern is mostly the mastodynia in the upper outer quadrant  The etiology for this is uncertain  In addition her nipple discharge has stopped  Her ultrasound images remain on clear concur with a six-month follow-up ultrasound we have placed an order for that will coordinate for her  We will have her see our advanced practitioner in 6 months following the imaging for follow-up  Patient is agreeable to this  Advance Care Planning/Advance Directives:  I discussed the disease status, treatment plans and follow-up with the patient

## 2020-02-25 ENCOUNTER — OFFICE VISIT (OUTPATIENT)
Dept: OBGYN CLINIC | Facility: CLINIC | Age: 25
End: 2020-02-25
Payer: COMMERCIAL

## 2020-02-25 VITALS
HEIGHT: 63 IN | WEIGHT: 149 LBS | BODY MASS INDEX: 26.4 KG/M2 | SYSTOLIC BLOOD PRESSURE: 108 MMHG | DIASTOLIC BLOOD PRESSURE: 74 MMHG

## 2020-02-25 DIAGNOSIS — Z30.011 ENCOUNTER FOR INITIAL PRESCRIPTION OF CONTRACEPTIVE PILLS: ICD-10-CM

## 2020-02-25 DIAGNOSIS — N89.8 VAGINAL DISCHARGE: ICD-10-CM

## 2020-02-25 DIAGNOSIS — B96.89 BACTERIAL VAGINOSIS: Primary | ICD-10-CM

## 2020-02-25 DIAGNOSIS — N76.0 BACTERIAL VAGINOSIS: Primary | ICD-10-CM

## 2020-02-25 LAB
BV WHIFF TEST VAG QL: ABNORMAL
CLUE CELLS SPEC QL WET PREP: POSITIVE
PH SMN: ABNORMAL [PH]
SL AMB POCT WET MOUNT: ABNORMAL
T VAGINALIS VAG QL WET PREP: NEGATIVE
YEAST VAG QL WET PREP: ABNORMAL

## 2020-02-25 PROCEDURE — 99214 OFFICE O/P EST MOD 30 MIN: CPT | Performed by: PHYSICIAN ASSISTANT

## 2020-02-25 PROCEDURE — 87210 SMEAR WET MOUNT SALINE/INK: CPT | Performed by: PHYSICIAN ASSISTANT

## 2020-02-25 RX ORDER — NORETHINDRONE ACETATE AND ETHINYL ESTRADIOL 1MG-20(21)
KIT ORAL
Qty: 28 TABLET | Refills: 3 | Status: SHIPPED | OUTPATIENT
Start: 2020-02-25 | End: 2020-05-26

## 2020-02-25 RX ORDER — METRONIDAZOLE 500 MG/1
500 TABLET ORAL EVERY 12 HOURS SCHEDULED
Qty: 14 TABLET | Refills: 0 | Status: SHIPPED | OUTPATIENT
Start: 2020-02-25 | End: 2020-03-03

## 2020-02-25 NOTE — ASSESSMENT & PLAN NOTE
Reviewed birth control options including OCP, NuvaRing, Patch, Depo, Nexplanon  Patient decided on OCP  Reviewed when to start, what to do if misses pill  Recommend using condoms for the 1st month on the pill, if misses more than 2 pills in the pack, if on antibiotics and for STD prevention  Reviewed common side effects of the pill including nausea, vomiting, breast pain, bloating, fatigue, mood swings, weight gain, and increased acne  Reassured side effects typically diminish in the first month or two on the pill  Reviewed clotting risk and signs and symptoms of pulmonary embolism, DVT, myocardial infarction, stroke  Will plan to start Loestrin 1/20  Will return to office in 3 months for pill check

## 2020-02-25 NOTE — PROGRESS NOTES
Assessment/Plan:    Encounter for initial prescription of contraceptive pills  Reviewed birth control options including OCP, NuvaRing, Patch, Depo, Nexplanon  Patient decided on OCP  Reviewed when to start, what to do if misses pill  Recommend using condoms for the 1st month on the pill, if misses more than 2 pills in the pack, if on antibiotics and for STD prevention  Reviewed common side effects of the pill including nausea, vomiting, breast pain, bloating, fatigue, mood swings, weight gain, and increased acne  Reassured side effects typically diminish in the first month or two on the pill  Reviewed clotting risk and signs and symptoms of pulmonary embolism, DVT, myocardial infarction, stroke  Will plan to start Loestrin 1/20  Will return to office in 3 months for pill check  Bacterial vaginosis  Reviewed BV with patient in length  Will plan to treat with Metronidazole 500mg BID x 7 days  Aware no alcohol with treatment  For prevention: Recommend acidophilus or yogurt daily, avoid irritating soaps or lotions, no tight fitted pants or underwear, avoid bubble baths, do not stay in wet swimsuit  Problem List Items Addressed This Visit        Genitourinary    Bacterial vaginosis - Primary     Reviewed BV with patient in length  Will plan to treat with Metronidazole 500mg BID x 7 days  Aware no alcohol with treatment  For prevention: Recommend acidophilus or yogurt daily, avoid irritating soaps or lotions, no tight fitted pants or underwear, avoid bubble baths, do not stay in wet swimsuit  Relevant Medications    metroNIDAZOLE (FLAGYL) 500 mg tablet       Other    Encounter for initial prescription of contraceptive pills     Reviewed birth control options including OCP, NuvaRing, Patch, Depo, Nexplanon  Patient decided on OCP  Reviewed when to start, what to do if misses pill    Recommend using condoms for the 1st month on the pill, if misses more than 2 pills in the pack, if on antibiotics and for STD prevention  Reviewed common side effects of the pill including nausea, vomiting, breast pain, bloating, fatigue, mood swings, weight gain, and increased acne  Reassured side effects typically diminish in the first month or two on the pill  Reviewed clotting risk and signs and symptoms of pulmonary embolism, DVT, myocardial infarction, stroke  Will plan to start Loestrin 1/20  Will return to office in 3 months for pill check  Relevant Medications    norethindrone-ethinyl estradiol (JUNEL FE 1/20) 1-20 MG-MCG per tablet      Other Visit Diagnoses     Vaginal discharge        Relevant Orders    POCT wet mount (Completed)            Subjective:      Patient ID: Quan Barton is a 30 y o  female  HPI  29 yo seen for seen for vaginal discharge  Reports worse over the last 2-3 weeks  Thick white discharge  No vaginal itching, admits to fishy odor  Denies bowel or bladder issues, fever, chills, pelvic pain  No STD concerns  Denies any new partners  Patient also reports having more acne outbreaks on her face in the last 2-3 weeks  Would also like to discuss new birth control method  Reports currently on the patch, has been getting rash around the site so stopped using  Would like to discuss other options  Patient smokes daily less then half a pack a day  The following portions of the patient's history were reviewed and updated as appropriate:   She  has a past medical history of Anxiety, Chest pain, Continuous RUQ abdominal pain, External hemorrhoid, Finger injury, Varicella, and Yeast vaginitis    She   Patient Active Problem List    Diagnosis Date Noted    Bacterial vaginosis 02/25/2020    Encounter for initial prescription of contraceptive pills 02/25/2020    Nipple discharge in female 10/18/2019    Encounter for gynecological examination (general) (routine) without abnormal findings 09/25/2019    Mastodynia of right breast 07/17/2019    Pelvic pain 04/02/2019    Postpartum depression 2018    Anxiety 11/15/2013     She  has a past surgical history that includes Dental surgery; Induced ; and [place] tooth extraction  Her family history includes Colon cancer in her cousin; Diabetes in her other; No Known Problems in her daughter, father, maternal aunt, maternal aunt, maternal grandfather, maternal grandmother, mother, paternal aunt, paternal aunt, paternal aunt, paternal grandfather, paternal grandmother, and sister  She  reports that she quit smoking about 4 months ago  Her smoking use included cigarettes  She has never used smokeless tobacco  She reports that she drinks about 1 0 standard drinks of alcohol per week  She reports that she does not use drugs  Current Outpatient Medications   Medication Sig Dispense Refill    LORazepam (ATIVAN) 0 5 mg tablet Take by mouth every 8 (eight) hours as needed for anxiety      metroNIDAZOLE (FLAGYL) 500 mg tablet Take 1 tablet (500 mg total) by mouth every 12 (twelve) hours for 7 days 14 tablet 0    norethindrone-ethinyl estradiol (JUNEL FE 1/20) 1-20 MG-MCG per tablet Take 1 tablet by mouth daily for 112 days, THEN 1 tablet daily  28 tablet 3     No current facility-administered medications for this visit  She has No Known Allergies       Review of Systems   Constitutional: Negative for fatigue, fever and unexpected weight change  HENT: Negative for dental problem and sinus pressure  Eyes: Negative for visual disturbance  Respiratory: Negative for cough, shortness of breath and wheezing  Cardiovascular: Negative for chest pain  Gastrointestinal: Negative for abdominal pain, blood in stool, constipation, diarrhea, nausea and vomiting  Endocrine: Negative for polydipsia  Genitourinary: Positive for vaginal discharge  Negative for difficulty urinating, dyspareunia, dysuria, frequency, hematuria, pelvic pain and urgency  Musculoskeletal: Negative for arthralgias and back pain     Neurological: Negative for dizziness, seizures, light-headedness and headaches  Psychiatric/Behavioral: Negative for suicidal ideas  The patient is not nervous/anxious  Objective:      /74 (BP Location: Left arm, Patient Position: Sitting, Cuff Size: Standard)   Ht 5' 3" (1 6 m)   Wt 67 6 kg (149 lb)   LMP 02/09/2020 (Approximate)   BMI 26 39 kg/m²          Physical Exam   Constitutional: She is oriented to person, place, and time  She appears well-developed and well-nourished  Neck: Neck supple  No thyroid mass and no thyromegaly present  Cardiovascular: Normal rate, regular rhythm and normal heart sounds  Exam reveals no gallop and no friction rub  No murmur heard  Pulmonary/Chest: Effort normal and breath sounds normal  No respiratory distress  She has no wheezes  She has no rales  Abdominal: Soft  Normal appearance and bowel sounds are normal  She exhibits no distension and no mass  There is no tenderness  There is no rebound and no guarding  Genitourinary: There is no rash, tenderness, lesion or injury on the right labia  There is no rash, tenderness, lesion or injury on the left labia  Uterus is not deviated, not enlarged and not tender  Cervix exhibits no motion tenderness, no discharge and no friability  Right adnexum displays no mass, no tenderness and no fullness  Left adnexum displays no mass, no tenderness and no fullness  No erythema, tenderness or bleeding in the vagina  No signs of injury around the vagina  Vaginal discharge (thin white watery discharge) found  Lymphadenopathy:     She has no cervical adenopathy  Right: No inguinal and no supraclavicular adenopathy present  Left: No inguinal and no supraclavicular adenopathy present  Neurological: She is alert and oriented to person, place, and time  Skin: Skin is warm and dry  No rash noted  No erythema  No pallor  Psychiatric: She has a normal mood and affect   Her behavior is normal  Judgment and thought content normal        Wet mount: Clue cells throughout   No trichomonads or yeast

## 2020-02-25 NOTE — ASSESSMENT & PLAN NOTE
Reviewed BV with patient in length  Will plan to treat with Metronidazole 500mg BID x 7 days  Aware no alcohol with treatment  For prevention: Recommend acidophilus or yogurt daily, avoid irritating soaps or lotions, no tight fitted pants or underwear, avoid bubble baths, do not stay in wet swimsuit

## 2020-04-23 ENCOUNTER — HOSPITAL ENCOUNTER (OUTPATIENT)
Dept: ULTRASOUND IMAGING | Facility: CLINIC | Age: 25
Discharge: HOME | End: 2020-04-23
Payer: COMMERCIAL

## 2020-04-23 VITALS — BODY MASS INDEX: 26.4 KG/M2 | WEIGHT: 149 LBS | HEIGHT: 63 IN

## 2020-04-23 DIAGNOSIS — R92.8 ABNORMAL FINDING ON BREAST IMAGING: ICD-10-CM

## 2020-04-23 PROCEDURE — 76642 ULTRASOUND BREAST LIMITED: CPT

## 2020-04-24 ENCOUNTER — TELEPHONE (OUTPATIENT)
Dept: SURGICAL ONCOLOGY | Facility: CLINIC | Age: 25
End: 2020-04-24

## 2020-04-28 ENCOUNTER — OFFICE VISIT (OUTPATIENT)
Dept: SURGICAL ONCOLOGY | Facility: CLINIC | Age: 25
End: 2020-04-28
Payer: COMMERCIAL

## 2020-04-28 VITALS
SYSTOLIC BLOOD PRESSURE: 118 MMHG | WEIGHT: 147 LBS | HEIGHT: 63 IN | TEMPERATURE: 97.8 F | RESPIRATION RATE: 16 BRPM | HEART RATE: 72 BPM | BODY MASS INDEX: 26.05 KG/M2 | DIASTOLIC BLOOD PRESSURE: 78 MMHG

## 2020-04-28 DIAGNOSIS — N64.4 MASTODYNIA OF RIGHT BREAST: Primary | ICD-10-CM

## 2020-04-28 DIAGNOSIS — N64.52 NIPPLE DISCHARGE IN FEMALE: ICD-10-CM

## 2020-04-28 PROCEDURE — 99213 OFFICE O/P EST LOW 20 MIN: CPT | Performed by: NURSE PRACTITIONER

## 2020-05-26 ENCOUNTER — TELEMEDICINE (OUTPATIENT)
Dept: OBGYN CLINIC | Facility: CLINIC | Age: 25
End: 2020-05-26
Payer: COMMERCIAL

## 2020-05-26 DIAGNOSIS — Z30.016 ENCOUNTER FOR INITIAL PRESCRIPTION OF TRANSDERMAL PATCH HORMONAL CONTRACEPTIVE DEVICE: Primary | ICD-10-CM

## 2020-05-26 PROBLEM — B96.89 BACTERIAL VAGINOSIS: Status: RESOLVED | Noted: 2020-02-25 | Resolved: 2020-05-26

## 2020-05-26 PROBLEM — N76.0 BACTERIAL VAGINOSIS: Status: RESOLVED | Noted: 2020-02-25 | Resolved: 2020-05-26

## 2020-05-26 PROCEDURE — 99213 OFFICE O/P EST LOW 20 MIN: CPT | Performed by: PHYSICIAN ASSISTANT

## 2021-06-03 ENCOUNTER — TELEPHONE (OUTPATIENT)
Dept: OBGYN CLINIC | Facility: CLINIC | Age: 26
End: 2021-06-03

## 2021-06-03 NOTE — TELEPHONE ENCOUNTER
Pt states discomfort started Tuesday vaginal discharge - white thicker, odor - bad, fish and foul, with swelling  Denies itch, lumps, or pelvic pain  Sexually active, no concerns for STD  Pt states she is uncomfortable all the time  Has had BV and a yeast infection in the past and what she is experiencing does not remind her of either

## 2021-06-03 NOTE — TELEPHONE ENCOUNTER
I would recommend trial of OTC monistat 7   Also have her start on a probiotic with acidophilus BID and we can follow-up at her appt on 6/16

## 2021-06-03 NOTE — TELEPHONE ENCOUNTER
Patient is having discomfort  Went in a hot tub on [place] day  No discharge no odor  Pain when urinating

## 2021-06-12 ENCOUNTER — OFFICE VISIT (OUTPATIENT)
Dept: URGENT CARE | Age: 26
End: 2021-06-12
Payer: COMMERCIAL

## 2021-06-12 VITALS
WEIGHT: 145 LBS | RESPIRATION RATE: 18 BRPM | TEMPERATURE: 97.9 F | BODY MASS INDEX: 25.69 KG/M2 | OXYGEN SATURATION: 99 % | DIASTOLIC BLOOD PRESSURE: 59 MMHG | HEIGHT: 63 IN | HEART RATE: 94 BPM | SYSTOLIC BLOOD PRESSURE: 118 MMHG

## 2021-06-12 DIAGNOSIS — F41.9 ANXIETY: Primary | ICD-10-CM

## 2021-06-12 PROCEDURE — 99213 OFFICE O/P EST LOW 20 MIN: CPT | Performed by: NURSE PRACTITIONER

## 2021-06-12 PROCEDURE — 93005 ELECTROCARDIOGRAM TRACING: CPT | Performed by: NURSE PRACTITIONER

## 2021-06-12 RX ORDER — HYDROXYZINE HYDROCHLORIDE 25 MG/1
25 TABLET, FILM COATED ORAL EVERY 6 HOURS PRN
Qty: 30 TABLET | Refills: 0 | Status: SHIPPED | OUTPATIENT
Start: 2021-06-12 | End: 2021-06-16 | Stop reason: ALTCHOICE

## 2021-06-12 NOTE — LETTER
June 12, 2021     Patient: Simon Cardoso   YOB: 1995   Date of Visit: 6/12/2021       To Whom It May Concern: It is my medical opinion that Simon Cardoso may return to work on 6/14/2021  If you have any questions or concerns, please don't hesitate to call           Sincerely,        LYNNE Stein    CC: No Recipients

## 2021-06-12 NOTE — PATIENT INSTRUCTIONS
You appear to be having an anxiety attack  You have been prescribed hydroxyzine for anxiety - take as prescribed  You are to follow up with your PCP  Avoid alcohol with this medication -  It may cause drowsiness  Go to the ED if symptoms worsen    Anxiety   WHAT YOU NEED TO KNOW:   Anxiety is a condition that causes you to feel extremely worried or nervous  The feelings are so strong that they can cause problems with your daily activities or sleep  Anxiety may be triggered by something you fear, or it may happen without a cause  Family or work stress, smoking, caffeine, and alcohol can increase your risk for anxiety  Certain medicines or health conditions can also increase your risk  Anxiety can become a long-term condition if it is not managed or treated  DISCHARGE INSTRUCTIONS:   Call your local emergency number (911 in the [address]) if:   · You have chest pain, tightness, or heaviness that may spread to your shoulders, arms, jaw, neck, or back  · You feel like hurting yourself or someone else  Call your doctor if:   · Your symptoms get worse or do not get better with treatment  · Your anxiety keeps you from doing your regular daily activities  · You have new symptoms since your last visit  · You have questions or concerns about your condition or care  Medicines:   · Medicines  may be given to help you feel more calm and relaxed, and decrease your symptoms  · Take your medicine as directed  Contact your healthcare provider if you think your medicine is not helping or if you have side effects  Tell him of her if you are allergic to any medicine  Keep a list of the medicines, vitamins, and herbs you take  Include the amounts, and when and why you take them  Bring the list or the pill bottles to follow-up visits  Carry your medicine list with you in case of an emergency  Manage anxiety:   · Talk to someone about your anxiety  Your healthcare provider may suggest counseling   Cognitive behavioral therapy can help you understand and change how you react to events that trigger your symptoms  You might feel more comfortable talking with a friend or family member about your anxiety  Choose someone you know will be supportive and encouraging  · Find ways to relax  Activities such as exercise, meditation, or listening to music can help you relax  Spend time with friends, or do things you enjoy  · Practice deep breathing  Deep breathing can help you relax when you feel anxious  Focus on taking slow, deep breaths several times a day, or during an anxiety attack  Breathe in through your nose and out through your mouth  · Create a regular sleep routine  Regular sleep can help you feel calmer during the day  Go to sleep and wake up at the same times every day  Do not watch television or use the computer right before bed  Your room should be comfortable, dark, and quiet  · Eat a variety of healthy foods  Healthy foods include fruits, vegetables, low-fat dairy products, lean meats, fish, whole-grain breads, and cooked beans  Healthy foods can help you feel less anxious and have more energy  · Exercise regularly  Exercise can increase your energy level  Exercise may also lift your mood and help you sleep better  Your healthcare provider can help you create an exercise plan  · Do not smoke  Nicotine and other chemicals in cigarettes and cigars can increase anxiety  Ask your healthcare provider for information if you currently smoke and need help to quit  E-cigarettes or smokeless tobacco still contain nicotine  Talk to your healthcare provider before you use these products  · Do not have caffeine  Caffeine can make your symptoms worse  Do not have foods or drinks that are meant to increase your energy level  · Limit or do not drink alcohol  Ask your healthcare provider if alcohol is safe for you   You may not be able to drink alcohol if you take certain anxiety or depression medicines  Limit alcohol to 1 drink per day if you are a woman  Limit alcohol to 2 drinks per day if you are a man  A drink of alcohol is 12 ounces of beer, 5 ounces of wine, or 1½ ounces of liquor  · Do not use drugs  Drugs can make your anxiety worse  It can also make anxiety hard to manage  Talk to your healthcare provider if you use drugs and want help to quit  Follow up with your doctor within 2 weeks or as directed:  Write down your questions so you remember to ask them during your visits  © Copyright [address] [place] [address] Information is for End User's use only and may not be sold, redistributed or otherwise used for commercial purposes  All illustrations and images included in CareNotes® are the copyrighted property of A D A M , Inc  or [facility] G[employer]elda Diallo   The above information is an  only  It is not intended as medical advice for individual conditions or treatments  Talk to your doctor, nurse or pharmacist before following any medical regimen to see if it is safe and effective for you

## 2021-06-12 NOTE — PROGRESS NOTES
[facility] [facility]        NAME: Rohith Griffiths is a 27 y o  female  : 1995    MRN: [id number]  DATE: 2021  TIME: 3:59 PM    Assessment and Plan   Anxiety [F41 9]  1  Anxiety  ECG 12 lead    hydrOXYzine HCL (ATARAX) 25 mg tablet         Patient Instructions       Follow up with PCP in 3-5 days  Proceed to  ER if symptoms worsen  You appear to be having an anxiety attack  You have been prescribed hydroxyzine for anxiety - take as prescribed  You are to follow up with your PCP  Avoid alcohol with this medication -  It may cause drowsiness  Go to the ED if symptoms worsen        Chief Complaint     Chief Complaint   Patient presents with    Anxiety    Dizziness         History of Present Illness       This is a 27year old female who states she was at work and started with palpitations, dizziness and mid back pain  She states she has a hx of anxiety so she went outside and walked around but it did not help  She states she took an ativan 40 minutes PTA which is not helping either  She states "I just don't feel good"  She denies any thing upset her today  Denies caffeine use today  She denies RDA or ETOH use  States LMP - due  She states she had a co worker drop her off here for evaluation  Anxiety  Symptoms include dizziness and nervous/anxious behavior  Dizziness        Review of Systems   Review of Systems   Constitutional: Negative  HENT: Negative  Eyes: Negative  Respiratory: Negative  Cardiovascular: Negative  Gastrointestinal: Negative  Endocrine: Negative  Genitourinary: Negative  Musculoskeletal: Negative  Allergic/Immunologic: Negative  Neurological: Positive for dizziness  Psychiatric/Behavioral: The patient is nervous/anxious            Current Medications       Current Outpatient Medications:     LORazepam (ATIVAN) 0 5 mg tablet, Take by mouth every 8 (eight) hours as needed for anxiety, Disp: , Rfl:     norelgestromin-ethinyl estradiol (ORTHO EVRA) 150-35 MCG/24HR, Place 1 patch on the skin once a week, Disp: 3 patch, Rfl: 4    hydrOXYzine HCL (ATARAX) 25 mg tablet, Take 1 tablet (25 mg total) by mouth every 6 (six) hours as needed for anxiety, Disp: 30 tablet, Rfl: 0    Current Allergies     Allergies as of 2021    (No Known Allergies)            The following portions of the patient's history were reviewed and updated as appropriate: allergies, current medications, past family history, past medical history, past social history, past surgical history and problem list      Past Medical History:   Diagnosis Date    Anxiety     last assessed - 2018    Chest pain     last assessed - [id number]    Continuous RUQ abdominal pain     last assessed - [id number]    External hemorrhoid     last assessed - [id number]    Finger injury     last assessed - [id number]    Varicella     childhood    Yeast vaginitis     last assessed - 2017       Past Surgical History:   Procedure Laterality Date    DENTAL SURGERY      INDUCED       WISDOM TOOTH EXTRACTION         Family History   Problem Relation Age of Onset    No Known Problems Maternal Aunt     No Known Problems Mother     Colon cancer Cousin     Diabetes Other     No Known Problems Maternal Aunt     No Known Problems Father     No Known Problems Sister     No Known Problems Daughter     No Known Problems Maternal Grandmother     No Known Problems Maternal Grandfather     No Known Problems Paternal Grandmother     No Known Problems Paternal Grandfather     No Known Problems Paternal Aunt     No Known Problems Paternal Aunt     No Known Problems Paternal Aunt          Medications have been verified  Objective   /59   Pulse 94   Temp 97 9 °F (36 6 °C)   Resp 18   Ht 5' 3" (1 6 m)   Wt 65 8 kg (145 lb)   SpO2 99%   BMI 25 69 kg/m²   No LMP recorded  Physical Exam     Physical Exam  Vitals signs and nursing note reviewed  Constitutional:       General: She is in acute distress  Appearance: Normal appearance  She is normal weight  She is not ill-appearing, toxic-appearing or diaphoretic  Comments: Pt is unable to sit still, she is rubbing her hands on her pants and wringing her hands  She is tearful  She taps her chest and states "my heart"  HENT:      Head: Normocephalic and atraumatic  Mouth/Throat:      Mouth: Mucous membranes are moist    Eyes:      Extraocular Movements: Extraocular movements intact  Neck:      Musculoskeletal: Normal range of motion  Cardiovascular:      Rate and Rhythm: Normal rate and regular rhythm  Pulses: Normal pulses  Heart sounds: Normal heart sounds  Pulmonary:      Effort: Pulmonary effort is normal       Breath sounds: Normal breath sounds  Abdominal:      General: There is no distension  Tenderness: There is no abdominal tenderness  Musculoskeletal: Normal range of motion  Skin:     General: Skin is warm and dry  Capillary Refill: Capillary refill takes less than 2 seconds  Neurological:      General: No focal deficit present  Mental Status: She is alert and oriented to person, place, and time     Psychiatric:      Comments: Anxious              EKG NSR 85  No stemi

## 2021-06-13 LAB
ATRIAL RATE: 85 BPM
ATRIAL RATE: 85 BPM
ATRIAL RATE: 87 BPM
P AXIS: 46 DEGREES
P AXIS: 52 DEGREES
P AXIS: 54 DEGREES
PR INTERVAL: 132 MS
PR INTERVAL: 138 MS
PR INTERVAL: 140 MS
QRS AXIS: 47 DEGREES
QRS AXIS: 47 DEGREES
QRS AXIS: 49 DEGREES
QRSD INTERVAL: 82 MS
QRSD INTERVAL: 84 MS
QRSD INTERVAL: 84 MS
QT INTERVAL: 364 MS
QT INTERVAL: 368 MS
QT INTERVAL: 370 MS
QTC INTERVAL: 437 MS
QTC INTERVAL: 438 MS
QTC INTERVAL: 440 MS
T WAVE AXIS: 46 DEGREES
T WAVE AXIS: 48 DEGREES
T WAVE AXIS: 57 DEGREES
VENTRICULAR RATE: 85 BPM
VENTRICULAR RATE: 85 BPM
VENTRICULAR RATE: 87 BPM

## 2021-06-13 PROCEDURE — 93010 ELECTROCARDIOGRAM REPORT: CPT | Performed by: INTERNAL MEDICINE

## 2021-06-16 ENCOUNTER — ANNUAL EXAM (OUTPATIENT)
Dept: OBGYN CLINIC | Facility: CLINIC | Age: 26
End: 2021-06-16
Payer: COMMERCIAL

## 2021-06-16 VITALS
DIASTOLIC BLOOD PRESSURE: 76 MMHG | HEIGHT: 63 IN | SYSTOLIC BLOOD PRESSURE: 120 MMHG | BODY MASS INDEX: 26.44 KG/M2 | WEIGHT: 149.2 LBS

## 2021-06-16 DIAGNOSIS — Z01.419 ENCOUNTER FOR GYNECOLOGICAL EXAMINATION (GENERAL) (ROUTINE) WITHOUT ABNORMAL FINDINGS: Primary | ICD-10-CM

## 2021-06-16 DIAGNOSIS — Z30.45 ENCOUNTER FOR SURVEILLANCE OF TRANSDERMAL PATCH HORMONAL CONTRACEPTIVE DEVICE: ICD-10-CM

## 2021-06-16 DIAGNOSIS — Z71.85 HPV VACCINE COUNSELING: ICD-10-CM

## 2021-06-16 PROBLEM — N64.52 NIPPLE DISCHARGE IN FEMALE: Status: RESOLVED | Noted: 2019-10-18 | Resolved: 2021-06-16

## 2021-06-16 PROBLEM — R10.2 PELVIC PAIN: Status: RESOLVED | Noted: 2019-04-02 | Resolved: 2021-06-16

## 2021-06-16 PROBLEM — N64.4 MASTODYNIA OF RIGHT BREAST: Status: RESOLVED | Noted: 2019-07-17 | Resolved: 2021-06-16

## 2021-06-16 PROBLEM — Z30.011 ENCOUNTER FOR INITIAL PRESCRIPTION OF CONTRACEPTIVE PILLS: Status: RESOLVED | Noted: 2020-02-25 | Resolved: 2021-06-16

## 2021-06-16 PROCEDURE — 0503F POSTPARTUM CARE VISIT: CPT | Performed by: PHYSICIAN ASSISTANT

## 2021-06-16 PROCEDURE — 99395 PREV VISIT EST AGE 18-39: CPT | Performed by: PHYSICIAN ASSISTANT

## 2021-06-16 NOTE — ASSESSMENT & PLAN NOTE
The current ASCCP guidelines were reviewed  Patient's last pap was 9/25/19 - WNL and therefore, a pap with reflex HPV cotesting is not indicated at this time  I emphasized the importance of an annual pelvic and breast exam  Patient ok to defer pap today

## 2021-06-16 NOTE — PATIENT INSTRUCTIONS
Human Papillomavirus Vaccine (By injection)   Human Papillomavirus Vaccine (HUE-man pap-ah-RYAN-laury-VYE-elayne VAX-een)  Helps prevent genital warts and cancer of the anus, cervix, vagina, vulva, oropharyngeal (mouth and throat), or head and neck, which may be caused by human papillomavirus (HPV)  Brand Name(s): Gardasil 9   There may be other brand names for this medicine  When This Medicine Should Not Be Used: This vaccine is not right for everyone  You should not receive it if you had an allergic reaction to human papillomavirus vaccine or to yeast   How to Use This Medicine:   Injectable  · Your doctor will prescribe your exact dose and tell you how often it should be given  This medicine is given as a shot into one of your muscles  It is usually given in the upper arm or upper leg  · A nurse or other health provider will give you this medicine  · This vaccine must be given as 2 or 3 doses based on the brand and your age  · Read and follow the patient instructions that come with this medicine  Talk to your doctor or pharmacist if you have any questions  · Missed dose: This vaccine needs to be given on a fixed schedule  If you miss your scheduled shot, call your doctor to make another appointment as soon as possible  Drugs and Foods to Avoid:   Ask your doctor or pharmacist before using any other medicine, including over-the-counter medicines, vitamins, and herbal products  · Some medicines can affect how this vaccine works  Tell your doctor if you are using any treatment that weakens the immune system, including cancer medicine, radiation treatment, or a steroid  Warnings While Using This Medicine:   · Tell your doctor if you are pregnant or breastfeeding, or if you have a weak immune system or are allergic to latex  · This vaccine will not protect you against sexually transmitted diseases that are not caused by HPV    · You still need to see your doctor for routine screening tests for anal or cervical cancer (pap test) even after you receive this vaccine  · You may feel faint, lightheaded, or dizzy right after you receive this vaccine  Your doctor may ask you to wait 15 minutes before standing  Possible Side Effects While Using This Medicine:   Call your doctor right away if you notice any of these side effects:  · Allergic reaction: Itching or hives, swelling in your face or hands, swelling or tingling in your mouth or throat, chest tightness, trouble breathing  · Lightheadedness, dizziness, or fainting  If you notice these less serious side effects, talk with your doctor:   · Headache, tiredness  · Mild fever  · Pain, redness, itching, or swelling where the shot is given  If you notice other side effects that you think are caused by this medicine, tell your doctor  Call your doctor for medical advice about side effects  You may report side effects to FDA at 1-859-FDA-5220  © Copyright [employer] 2021 Information is for End User's use only and may not be sold, redistributed or otherwise used for commercial purposes  The above information is an  only  It is not intended as medical advice for individual conditions or treatments  Talk to your doctor, nurse or pharmacist before following any medical regimen to see if it is safe and effective for you

## 2021-06-16 NOTE — PROGRESS NOTES
Assessment/Plan   Diagnoses and all orders for this visit:    Encounter for gynecological examination (general) (routine) without abnormal findings  The current ASCCP guidelines were reviewed  Patient's last pap was 9/25/19 - WNL and therefore, a pap with reflex HPV cotesting is not indicated at this time  I emphasized the importance of an annual pelvic and breast exam  Patient ok to defer pap today  HPV vaccine counseling  The patient has not had the Gardasil vaccine series, which is recommended for patients from 545 years of age  Strongly encourage - handout given; pt to check with insurance for coverage and call if desires    Encounter for surveillance of transdermal patch hormonal contraceptive device  -     norelgestromin-ethinyl estradiol (ORTHO EVRA) 150-35 MCG/24HR; Place 1 patch on the skin once a week  Contraception - continue Ortho Evra patch 1 patch on skin once a week  Strongly encourage patient to discontinue smoking to minimize risk of VTE  Discussion  I have discussed the importance of monthly self-breast exams, exercise and healthy diet as well as adequate intake of calcium and vitamin D  Encourage MVI q day and r/ancelmo importance of folic acid; Encourage 30-40 min weight bearing exercise most days of week  Encourage safe sexual practices; STD testing - declines  All questions have been answered to her satisfaction  RTO for APE or sooner if needed    Subjective     HPI   Farhad Christine is a 27 y o  female who presents for annual well woman exam  Prior annual noted thyromegaly and B/L nipple discharge - TSH and prolactin WNL; 9/27/19 thyroid ultrasound WNL; 10/2/19 - B/L breast ultrasound Birads 2 benign; 4/23/20 - Right breast ultrasound with Dr Radu Kiran Birads 2 benign  Menarche - 10; LMP - 5/18/21; Periods are reg q month and last 5 days; No excessive bleeding; No intermenstrual bleeding or spotting; Cramps are tolerable  No vulvar itch/burn; No vaginal itch/burn;  No abn discharge or odor; No urinary sx - burning/pain/frequency/hematuria  (+) SBEs - no breast masses, asymmetry, nipple discharge or bleeding, changes in skin of breast, or breast tenderness bilaterally  No abd/pelvic pain or HAs;   Pt is sexually active in a mutually monog/engaged sexual relationship x 5 years; No issues with intercourse; She declines std/hiv/hep testing; Feels safe at home  Current contraception: OE patch  Gardasil - not done  (+) PCP for routine Bw/care; Last Pap - 19 - WNL  History of abnormal Pap smear: no  Last STD screen - 19 - C/G and genital culture WNL    Review of Systems   Constitutional: Negative for activity change, fatigue, fever and unexpected weight change  HENT: Negative for congestion, dental problem, sinus pressure and sinus pain  Eyes: Negative for visual disturbance  Respiratory: Negative for cough, shortness of breath and wheezing  Cardiovascular: Negative for chest pain and leg swelling  Gastrointestinal: Negative for abdominal distention, abdominal pain, blood in stool, constipation, diarrhea, nausea and vomiting  Endocrine: Negative for polydipsia  Genitourinary: Negative for difficulty urinating, dyspareunia, dysuria, frequency, hematuria, menstrual problem, pelvic pain, urgency, vaginal bleeding, vaginal discharge and vaginal pain  Musculoskeletal: Negative for arthralgias and back pain  Allergic/Immunologic: Negative for environmental allergies  Neurological: Negative for dizziness, seizures and headaches  Psychiatric/Behavioral: Negative for dysphoric mood and sleep disturbance  The patient is not nervous/anxious          The following portions of the patient's history were reviewed and updated as appropriate: allergies, current medications, past family history, past medical history, past social history, past surgical history and problem list          OB History        3    Para   2    Term   2            AB   1    Living   2       SAB TAB        Ectopic        Multiple   0    Live Births   2           Obstetric Comments   : 2013  M; 2018  F; 1 VIP - surgical  Age of menarche 13  Age at first live birth 19             Past Medical History:   Diagnosis Date    Anxiety     last assessed - 2018    Chest pain     last assessed - [id number]    Continuous RUQ abdominal pain     last assessed - [id number]    External hemorrhoid     last assessed - [id number]    Finger injury     last assessed - [id number]    Varicella     childhood    Yeast vaginitis     last assessed - [id number]       Past Surgical History:   Procedure Laterality Date    DENTAL SURGERY      INDUCED       WISDOM TOOTH EXTRACTION         Family History   Problem Relation Age of Onset    No Known Problems Maternal Aunt     No Known Problems Mother     Colon cancer Cousin     Diabetes Other     No Known Problems Maternal Aunt     No Known Problems Father     No Known Problems Sister     No Known Problems Daughter     No Known Problems Maternal Grandmother     No Known Problems Maternal Grandfather     No Known Problems Paternal Grandmother     No Known Problems Paternal Grandfather     No Known Problems Paternal Aunt     No Known Problems Paternal Aunt     No Known Problems Paternal Aunt        Social History     Socioeconomic History    Marital status: Single     Spouse name: Not on file    Number of children: Not on file    Years of education: Not on file    Highest education level: Not on file   Occupational History    Not on file   Tobacco Use    Smoking status: Current Some Day Smoker     Types: Cigarettes     Last attempt to quit: 10/1/2019     Years since quittin 7    Smokeless tobacco: Never Used    Tobacco comment: 1 pack/week - working on quitting   Vaping Use    Vaping Use: Never used   Substance and Sexual Activity    Alcohol use:  Yes     Alcohol/week: 10 0 standard drinks     Types: 10 Standard drinks or equivalent per week     Comment: social     Drug use: No    Sexual activity: Yes     Partners: Male     Birth control/protection: Patch   Other Topics Concern    Not on file   Social History Narrative    Not on file     Social Determinants of Health     Financial Resource Strain:     Difficulty of Paying Living Expenses:    Food Insecurity:     Worried About Running Out of Food in the Last Year:     [address] in the Last Year:    Transportation Needs:     Lack of Transportation (Medical):  Lack of Transportation (Non-Medical):    Physical Activity:     Days of Exercise per Week:     Minutes of Exercise per Session:    Stress:     Feeling of Stress :    Social Connections:     Frequency of Communication with Friends and Family:     Frequency of Social Gatherings with Friends and Family:     Attends Congregation Services:     Active Member of Clubs or Organizations:     Attends Club or Organization Meetings:     Marital Status:    Intimate Partner Violence:     Fear of Current or Ex-Partner:     Emotionally Abused:     Physically Abused:     Sexually Abused:          Current Outpatient Medications:     LORazepam (ATIVAN) 0 5 mg tablet, Take by mouth every 8 (eight) hours as needed for anxiety, Disp: , Rfl:     MULTIPLE VITAMIN PO, Take by mouth, Disp: , Rfl:     norelgestromin-ethinyl estradiol (ORTHO EVRA) 150-35 MCG/24HR, Place 1 patch on the skin once a week, Disp: 9 patch, Rfl: 3    No Known Allergies    Objective   Vitals:    06/16/21 1046   BP: 120/76   BP Location: Left arm   Patient Position: Sitting   Cuff Size: Standard   Weight: 67 7 kg (149 lb 3 2 oz)   Height: 5' 3" (1 6 m)     Physical Exam  Vitals reviewed  Constitutional:       General: She is awake  She is not in acute distress  Appearance: Normal appearance  She is well-developed and well-groomed  She is not diaphoretic     Eyes:      Conjunctiva/sclera: Conjunctivae normal    Neck:      Thyroid: No thyroid mass, thyromegaly or thyroid tenderness  Cardiovascular:      Rate and Rhythm: Normal rate and regular rhythm  Heart sounds: Normal heart sounds  No murmur heard  Pulmonary:      Effort: Pulmonary effort is normal  No tachypnea, bradypnea or respiratory distress  Breath sounds: Normal breath sounds  No stridor or decreased air movement  No wheezing  Chest:      Breasts: Breasts are symmetrical          Right: Normal  No swelling, bleeding, inverted nipple, mass, nipple discharge, skin change or tenderness  Left: Normal  No swelling, bleeding, inverted nipple, mass, nipple discharge, skin change or tenderness  Abdominal:      General: There is no distension  Palpations: Abdomen is soft  There is no hepatomegaly, splenomegaly or mass  Tenderness: There is no abdominal tenderness  Hernia: No hernia is present  There is no hernia in the left inguinal area or right inguinal area  Genitourinary:     General: Normal vulva  Exam position: Supine  Pubic Area: No rash or pubic lice  Labia:         Right: No rash, tenderness, lesion or injury  Left: No rash, tenderness, lesion or injury  Urethra: No prolapse, urethral pain, urethral swelling or urethral lesion  Vagina: Normal  No signs of injury and foreign body  No vaginal discharge, erythema, tenderness, bleeding, lesions or prolapsed vaginal walls  Cervix: No cervical motion tenderness, discharge, friability, lesion, erythema or cervical bleeding  Uterus: Not deviated, not enlarged, not fixed, not tender and no uterine prolapse  Adnexa:         Right: No mass, tenderness or fullness  Left: No mass, tenderness or fullness  Lymphadenopathy:      Cervical: No cervical adenopathy  Upper Body:      Right upper body: No supraclavicular or axillary adenopathy  Left upper body: No supraclavicular or axillary adenopathy  Lower Body: No right inguinal adenopathy   No left inguinal adenopathy  Skin:     General: Skin is warm and dry  Neurological:      Mental Status: She is alert and oriented to person, place, and time  Psychiatric:         Mood and Affect: Mood and affect normal          Speech: Speech normal          Behavior: Behavior normal  Behavior is cooperative  Thought Content: Thought content normal          Judgment: Judgment normal          Patient Instructions   Human Papillomavirus Vaccine (By injection)   Human Papillomavirus Vaccine (HUE-man pap-ah-RYAN-laury-VYE-elayne VAX-een)  Helps prevent genital warts and cancer of the anus, cervix, vagina, vulva, oropharyngeal (mouth and throat), or head and neck, which may be caused by human papillomavirus (HPV)  Brand Name(s): Gardasil 9   There may be other brand names for this medicine  When This Medicine Should Not Be Used: This vaccine is not right for everyone  You should not receive it if you had an allergic reaction to human papillomavirus vaccine or to yeast   How to Use This Medicine:   Injectable  · Your doctor will prescribe your exact dose and tell you how often it should be given  This medicine is given as a shot into one of your muscles  It is usually given in the upper arm or upper leg  · A nurse or other health provider will give you this medicine  · This vaccine must be given as 2 or 3 doses based on the brand and your age  · Read and follow the patient instructions that come with this medicine  Talk to your doctor or pharmacist if you have any questions  · Missed dose: This vaccine needs to be given on a fixed schedule  If you miss your scheduled shot, call your doctor to make another appointment as soon as possible  Drugs and Foods to Avoid:   Ask your doctor or pharmacist before using any other medicine, including over-the-counter medicines, vitamins, and herbal products  · Some medicines can affect how this vaccine works   Tell your doctor if you are using any treatment that weakens the immune system, including cancer medicine, radiation treatment, or a steroid  Warnings While Using This Medicine:   · Tell your doctor if you are pregnant or breastfeeding, or if you have a weak immune system or are allergic to latex  · This vaccine will not protect you against sexually transmitted diseases that are not caused by HPV  · You still need to see your doctor for routine screening tests for anal or cervical cancer (pap test) even after you receive this vaccine  · You may feel faint, lightheaded, or dizzy right after you receive this vaccine  Your doctor may ask you to wait 15 minutes before standing  Possible Side Effects While Using This Medicine:   Call your doctor right away if you notice any of these side effects:  · Allergic reaction: Itching or hives, swelling in your face or hands, swelling or tingling in your mouth or throat, chest tightness, trouble breathing  · Lightheadedness, dizziness, or fainting  If you notice these less serious side effects, talk with your doctor:   · Headache, tiredness  · Mild fever  · Pain, redness, itching, or swelling where the shot is given  If you notice other side effects that you think are caused by this medicine, tell your doctor  Call your doctor for medical advice about side effects  You may report side effects to FDA at 3-025-FDA-3199  © Copyright [employer] [facility] 2021 Information is for End User's use only and may not be sold, redistributed or otherwise used for commercial purposes  The above information is an  only  It is not intended as medical advice for individual conditions or treatments  Talk to your doctor, nurse or pharmacist before following any medical regimen to see if it is safe and effective for you

## 2021-08-27 DIAGNOSIS — B96.89 BV (BACTERIAL VAGINOSIS): Primary | ICD-10-CM

## 2021-08-27 DIAGNOSIS — N76.0 BV (BACTERIAL VAGINOSIS): Primary | ICD-10-CM

## 2021-08-27 RX ORDER — METRONIDAZOLE 500 MG/1
500 TABLET ORAL 2 TIMES DAILY
Qty: 14 TABLET | Refills: 0 | Status: SHIPPED | OUTPATIENT
Start: 2021-08-27 | End: 2021-09-03

## 2021-08-27 NOTE — TELEPHONE ENCOUNTER
Reviewed with pt  Pt will not take medication from PCP, Trimethoprim  Pt will complete the flagyl medication  Reviewed with pt no alcohol while taking medication  Pt will contact office if symptoms do not improve or worsen

## 2021-08-27 NOTE — TELEPHONE ENCOUNTER
Pt states symptoms started about a month ago  Odor - fishy, or discharge - watery clear  Denies itch, irritation, pain with intercourse or pelvic pain or cramping  Sexually active, no protection, fiancee  No concerns for STD  LMP: 8/19/21  Pt has not tried anything OTC  PCP ruled out UTI  Trace blood in urine  PCP did prescribe Trimethoprim 160 mg BID x 5 day, pt did not start  Offered pt apt for Monday but pt would like to know if she take the medication could it help her symptoms and may not need an apt

## 2021-08-27 NOTE — TELEPHONE ENCOUNTER
Sounds like BV  If ruled out UTI I would not recommend Trimethoprim  I would recommend Metronidazole 500 mg BID x 7 days  Cannot drink alcohol with medication

## 2021-10-05 ENCOUNTER — TELEPHONE (OUTPATIENT)
Dept: OBGYN CLINIC | Facility: CLINIC | Age: 26
End: 2021-10-05

## 2021-10-14 ENCOUNTER — OFFICE VISIT (OUTPATIENT)
Dept: OBGYN CLINIC | Facility: CLINIC | Age: 26
End: 2021-10-14
Payer: COMMERCIAL

## 2021-10-14 VITALS
WEIGHT: 143 LBS | HEIGHT: 63 IN | DIASTOLIC BLOOD PRESSURE: 78 MMHG | SYSTOLIC BLOOD PRESSURE: 118 MMHG | BODY MASS INDEX: 25.34 KG/M2

## 2021-10-14 DIAGNOSIS — R31.29 MICROSCOPIC HEMATURIA: ICD-10-CM

## 2021-10-14 DIAGNOSIS — N76.0 BACTERIAL VAGINOSIS: Primary | ICD-10-CM

## 2021-10-14 DIAGNOSIS — N89.8 VAGINAL DISCHARGE: ICD-10-CM

## 2021-10-14 DIAGNOSIS — B96.89 BACTERIAL VAGINOSIS: Primary | ICD-10-CM

## 2021-10-14 DIAGNOSIS — N64.59 NIPPLE PROBLEM: ICD-10-CM

## 2021-10-14 DIAGNOSIS — R10.2 PELVIC PAIN: ICD-10-CM

## 2021-10-14 LAB
BACTERIA UR QL AUTO: ABNORMAL /HPF
BILIRUB UR QL STRIP: NEGATIVE
CLARITY UR: CLEAR
COLOR UR: YELLOW
GLUCOSE UR STRIP-MCNC: NEGATIVE MG/DL
HGB UR QL STRIP.AUTO: ABNORMAL
HYALINE CASTS #/AREA URNS LPF: ABNORMAL /LPF
KETONES UR STRIP-MCNC: NEGATIVE MG/DL
LEUKOCYTE ESTERASE UR QL STRIP: NEGATIVE
NITRITE UR QL STRIP: NEGATIVE
NON-SQ EPI CELLS URNS QL MICRO: ABNORMAL /HPF
PH UR STRIP.AUTO: 5.5 [PH]
PROT UR STRIP-MCNC: NEGATIVE MG/DL
RBC #/AREA URNS AUTO: ABNORMAL /HPF
SL AMB  POCT GLUCOSE, UA: NEGATIVE
SL AMB LEUKOCYTE ESTERASE,UA: NEGATIVE
SL AMB POCT BILIRUBIN,UA: NEGATIVE
SL AMB POCT BLOOD,UA: ABNORMAL
SL AMB POCT CLARITY,UA: CLEAR
SL AMB POCT COLOR,UA: YELLOW
SL AMB POCT KETONES,UA: NEGATIVE
SL AMB POCT NITRITE,UA: NEGATIVE
SL AMB POCT PH,UA: 5
SL AMB POCT SPECIFIC GRAVITY,UA: 1.03
SL AMB POCT URINE PROTEIN: NEGATIVE
SL AMB POCT UROBILINOGEN: NEGATIVE
SP GR UR STRIP.AUTO: 1.03 (ref 1–1.03)
UROBILINOGEN UR QL STRIP.AUTO: 0.2 E.U./DL
WBC #/AREA URNS AUTO: ABNORMAL /HPF

## 2021-10-14 PROCEDURE — 87480 CANDIDA DNA DIR PROBE: CPT | Performed by: PHYSICIAN ASSISTANT

## 2021-10-14 PROCEDURE — 81002 URINALYSIS NONAUTO W/O SCOPE: CPT | Performed by: PHYSICIAN ASSISTANT

## 2021-10-14 PROCEDURE — 99214 OFFICE O/P EST MOD 30 MIN: CPT | Performed by: PHYSICIAN ASSISTANT

## 2021-10-14 PROCEDURE — 87210 SMEAR WET MOUNT SALINE/INK: CPT | Performed by: PHYSICIAN ASSISTANT

## 2021-10-14 PROCEDURE — 87510 GARDNER VAG DNA DIR PROBE: CPT | Performed by: PHYSICIAN ASSISTANT

## 2021-10-14 PROCEDURE — 87660 TRICHOMONAS VAGIN DIR PROBE: CPT | Performed by: PHYSICIAN ASSISTANT

## 2021-10-14 PROCEDURE — 81001 URINALYSIS AUTO W/SCOPE: CPT | Performed by: PHYSICIAN ASSISTANT

## 2021-10-14 PROCEDURE — 87086 URINE CULTURE/COLONY COUNT: CPT | Performed by: PHYSICIAN ASSISTANT

## 2021-10-14 RX ORDER — CLINDAMYCIN HYDROCHLORIDE 300 MG/1
300 CAPSULE ORAL 2 TIMES DAILY
Qty: 14 CAPSULE | Refills: 0 | Status: SHIPPED | OUTPATIENT
Start: 2021-10-14 | End: 2021-10-21

## 2021-10-15 PROBLEM — R31.29 MICROSCOPIC HEMATURIA: Status: ACTIVE | Noted: 2021-10-15

## 2021-10-15 PROBLEM — N64.59 NIPPLE PROBLEM: Status: ACTIVE | Noted: 2021-10-15

## 2021-10-15 PROBLEM — B96.89 BACTERIAL VAGINOSIS: Status: ACTIVE | Noted: 2021-10-15

## 2021-10-15 PROBLEM — N76.0 BACTERIAL VAGINOSIS: Status: ACTIVE | Noted: 2021-10-15

## 2021-10-15 LAB
BACTERIA UR CULT: NORMAL
BV WHIFF TEST VAG QL: ABNORMAL
CLUE CELLS SPEC QL WET PREP: ABNORMAL
PH SMN: 7 [PH]
SL AMB POCT WET MOUNT: ABNORMAL
T VAGINALIS VAG QL WET PREP: ABNORMAL
YEAST VAG QL WET PREP: ABNORMAL

## 2021-10-16 LAB
CANDIDA RRNA VAG QL PROBE: NEGATIVE
G VAGINALIS RRNA GENITAL QL PROBE: NEGATIVE
T VAGINALIS RRNA GENITAL QL PROBE: NEGATIVE

## 2021-10-19 DIAGNOSIS — R30.0 DYSURIA: ICD-10-CM

## 2021-10-19 DIAGNOSIS — R10.2 PELVIC PAIN: Primary | ICD-10-CM

## 2021-10-22 ENCOUNTER — APPOINTMENT (OUTPATIENT)
Dept: LAB | Age: 26
End: 2021-10-22
Payer: COMMERCIAL

## 2021-10-22 DIAGNOSIS — R10.2 PELVIC PAIN: ICD-10-CM

## 2021-10-22 DIAGNOSIS — R30.0 DYSURIA: ICD-10-CM

## 2021-10-22 LAB
AMORPH URATE CRY URNS QL MICRO: ABNORMAL /HPF
BACTERIA UR QL AUTO: ABNORMAL /HPF
BILIRUB UR QL STRIP: NEGATIVE
CAOX CRY URNS QL MICRO: ABNORMAL /HPF
CLARITY UR: ABNORMAL
COLOR UR: YELLOW
GLUCOSE UR STRIP-MCNC: NEGATIVE MG/DL
HGB UR QL STRIP.AUTO: ABNORMAL
KETONES UR STRIP-MCNC: ABNORMAL MG/DL
LEUKOCYTE ESTERASE UR QL STRIP: NEGATIVE
NITRITE UR QL STRIP: NEGATIVE
NON-SQ EPI CELLS URNS QL MICRO: ABNORMAL /HPF
PH UR STRIP.AUTO: 6 [PH]
PROT UR STRIP-MCNC: NEGATIVE MG/DL
RBC #/AREA URNS AUTO: ABNORMAL /HPF
SP GR UR STRIP.AUTO: 1.03 (ref 1–1.03)
UROBILINOGEN UR QL STRIP.AUTO: 0.2 E.U./DL
WBC #/AREA URNS AUTO: ABNORMAL /HPF

## 2021-10-22 PROCEDURE — 87491 CHLMYD TRACH DNA AMP PROBE: CPT

## 2021-10-22 PROCEDURE — 87086 URINE CULTURE/COLONY COUNT: CPT

## 2021-10-22 PROCEDURE — 87591 N.GONORRHOEAE DNA AMP PROB: CPT

## 2021-10-22 PROCEDURE — 81001 URINALYSIS AUTO W/SCOPE: CPT

## 2021-10-23 LAB — BACTERIA UR CULT: NORMAL

## 2021-10-24 LAB
C TRACH DNA SPEC QL NAA+PROBE: NEGATIVE
N GONORRHOEA DNA SPEC QL NAA+PROBE: NEGATIVE

## 2021-11-16 ENCOUNTER — TELEPHONE (OUTPATIENT)
Dept: OBGYN CLINIC | Facility: CLINIC | Age: 26
End: 2021-11-16

## 2021-12-02 ENCOUNTER — APPOINTMENT (OUTPATIENT)
Dept: LAB | Age: 26
End: 2021-12-02
Payer: COMMERCIAL

## 2021-12-02 ENCOUNTER — OFFICE VISIT (OUTPATIENT)
Dept: OBGYN CLINIC | Facility: CLINIC | Age: 26
End: 2021-12-02
Payer: COMMERCIAL

## 2021-12-02 VITALS
BODY MASS INDEX: 26.4 KG/M2 | WEIGHT: 149 LBS | SYSTOLIC BLOOD PRESSURE: 108 MMHG | DIASTOLIC BLOOD PRESSURE: 74 MMHG | HEIGHT: 63 IN

## 2021-12-02 DIAGNOSIS — N92.6 IRREGULAR MENSES: ICD-10-CM

## 2021-12-02 DIAGNOSIS — R82.90 ABNORMAL URINE FINDINGS: ICD-10-CM

## 2021-12-02 DIAGNOSIS — N89.8 VAGINAL DISCHARGE: ICD-10-CM

## 2021-12-02 DIAGNOSIS — N64.3 GALACTORRHEA: ICD-10-CM

## 2021-12-02 DIAGNOSIS — N92.6 IRREGULAR MENSES: Primary | ICD-10-CM

## 2021-12-02 DIAGNOSIS — Z11.3 SCREENING EXAMINATION FOR VENEREAL DISEASE: ICD-10-CM

## 2021-12-02 DIAGNOSIS — R31.9 HEMATURIA, UNSPECIFIED TYPE: ICD-10-CM

## 2021-12-02 LAB
BASOPHILS # BLD AUTO: 0.05 THOUSANDS/ΜL (ref 0–0.1)
BASOPHILS NFR BLD AUTO: 1 % (ref 0–1)
EOSINOPHIL # BLD AUTO: 0.21 THOUSAND/ΜL (ref 0–0.61)
EOSINOPHIL NFR BLD AUTO: 3 % (ref 0–6)
ERYTHROCYTE [DISTWIDTH] IN BLOOD BY AUTOMATED COUNT: 12.5 % (ref 11.6–15.1)
HCT VFR BLD AUTO: 41.8 % (ref 34.8–46.1)
HGB BLD-MCNC: 13.3 G/DL (ref 11.5–15.4)
IMM GRANULOCYTES # BLD AUTO: 0.02 THOUSAND/UL (ref 0–0.2)
IMM GRANULOCYTES NFR BLD AUTO: 0 % (ref 0–2)
LYMPHOCYTES # BLD AUTO: 2.19 THOUSANDS/ΜL (ref 0.6–4.47)
LYMPHOCYTES NFR BLD AUTO: 26 % (ref 14–44)
MCH RBC QN AUTO: 31.1 PG (ref 26.8–34.3)
MCHC RBC AUTO-ENTMCNC: 31.8 G/DL (ref 31.4–37.4)
MCV RBC AUTO: 98 FL (ref 82–98)
MONOCYTES # BLD AUTO: 0.67 THOUSAND/ΜL (ref 0.17–1.22)
MONOCYTES NFR BLD AUTO: 8 % (ref 4–12)
NEUTROPHILS # BLD AUTO: 5.18 THOUSANDS/ΜL (ref 1.85–7.62)
NEUTS SEG NFR BLD AUTO: 62 % (ref 43–75)
NRBC BLD AUTO-RTO: 0 /100 WBCS
PLATELET # BLD AUTO: 222 THOUSANDS/UL (ref 149–390)
PMV BLD AUTO: 11.1 FL (ref 8.9–12.7)
RBC # BLD AUTO: 4.28 MILLION/UL (ref 3.81–5.12)
T4 FREE SERPL-MCNC: 0.92 NG/DL (ref 0.76–1.46)
TSH SERPL DL<=0.05 MIU/L-ACNC: 1.04 UIU/ML (ref 0.36–3.74)
WBC # BLD AUTO: 8.32 THOUSAND/UL (ref 4.31–10.16)

## 2021-12-02 PROCEDURE — 99214 OFFICE O/P EST MOD 30 MIN: CPT | Performed by: PHYSICIAN ASSISTANT

## 2021-12-02 PROCEDURE — 84443 ASSAY THYROID STIM HORMONE: CPT

## 2021-12-02 PROCEDURE — 87591 N.GONORRHOEAE DNA AMP PROB: CPT | Performed by: PHYSICIAN ASSISTANT

## 2021-12-02 PROCEDURE — 84703 CHORIONIC GONADOTROPIN ASSAY: CPT

## 2021-12-02 PROCEDURE — 87070 CULTURE OTHR SPECIMN AEROBIC: CPT | Performed by: PHYSICIAN ASSISTANT

## 2021-12-02 PROCEDURE — 85025 COMPLETE CBC W/AUTO DIFF WBC: CPT

## 2021-12-02 PROCEDURE — 84439 ASSAY OF FREE THYROXINE: CPT

## 2021-12-02 PROCEDURE — 87491 CHLMYD TRACH DNA AMP PROBE: CPT | Performed by: PHYSICIAN ASSISTANT

## 2021-12-02 PROCEDURE — 36415 COLL VENOUS BLD VENIPUNCTURE: CPT

## 2021-12-03 LAB
C TRACH DNA SPEC QL NAA+PROBE: NEGATIVE
HCG SERPL QL: NEGATIVE
N GONORRHOEA DNA SPEC QL NAA+PROBE: NEGATIVE

## 2021-12-06 LAB — BACTERIA GENITAL AEROBE CULT: NORMAL

## 2022-01-30 ENCOUNTER — OFFICE VISIT (OUTPATIENT)
Dept: URGENT CARE | Age: 27
End: 2022-01-30
Payer: COMMERCIAL

## 2022-01-30 VITALS
HEART RATE: 88 BPM | DIASTOLIC BLOOD PRESSURE: 74 MMHG | OXYGEN SATURATION: 99 % | TEMPERATURE: 97.2 F | SYSTOLIC BLOOD PRESSURE: 128 MMHG | RESPIRATION RATE: 20 BRPM

## 2022-01-30 DIAGNOSIS — R10.11 RIGHT UPPER QUADRANT ABDOMINAL PAIN: Primary | ICD-10-CM

## 2022-01-30 PROCEDURE — G0382 LEV 3 HOSP TYPE B ED VISIT: HCPCS | Performed by: STUDENT IN AN ORGANIZED HEALTH CARE EDUCATION/TRAINING PROGRAM

## 2022-01-30 PROCEDURE — S9083 URGENT CARE CENTER GLOBAL: HCPCS | Performed by: STUDENT IN AN ORGANIZED HEALTH CARE EDUCATION/TRAINING PROGRAM

## 2022-01-30 RX ORDER — FAMOTIDINE 20 MG/1
20 TABLET, FILM COATED ORAL 2 TIMES DAILY
Qty: 30 TABLET | Refills: 0 | Status: SHIPPED | OUTPATIENT
Start: 2022-01-30 | End: 2022-05-16 | Stop reason: SDUPTHER

## 2022-01-31 ENCOUNTER — HOSPITAL ENCOUNTER (EMERGENCY)
Facility: HOSPITAL | Age: 27
Discharge: HOME | End: 2022-01-31
Attending: EMERGENCY MEDICINE
Payer: COMMERCIAL

## 2022-01-31 ENCOUNTER — APPOINTMENT (EMERGENCY)
Dept: ULTRASOUND IMAGING | Facility: HOSPITAL | Age: 27
End: 2022-01-31
Payer: COMMERCIAL

## 2022-01-31 VITALS
DIASTOLIC BLOOD PRESSURE: 57 MMHG | HEART RATE: 72 BPM | TEMPERATURE: 97.9 F | SYSTOLIC BLOOD PRESSURE: 98 MMHG | OXYGEN SATURATION: 98 % | RESPIRATION RATE: 18 BRPM

## 2022-01-31 DIAGNOSIS — R10.11 RUQ PAIN: Primary | ICD-10-CM

## 2022-01-31 DIAGNOSIS — D18.03 LIVER HEMANGIOMA: ICD-10-CM

## 2022-01-31 LAB
ALBUMIN SERPL BCP-MCNC: 4 G/DL (ref 3.5–5)
ALP SERPL-CCNC: 76 U/L (ref 46–116)
ALT SERPL W P-5'-P-CCNC: 46 U/L (ref 12–78)
ANION GAP SERPL CALCULATED.3IONS-SCNC: 8 MMOL/L (ref 4–13)
AST SERPL W P-5'-P-CCNC: 21 U/L (ref 5–45)
BASOPHILS # BLD AUTO: 0.05 THOUSANDS/ΜL (ref 0–0.1)
BASOPHILS NFR BLD AUTO: 1 % (ref 0–1)
BILIRUB SERPL-MCNC: 0.17 MG/DL (ref 0.2–1)
BUN SERPL-MCNC: 16 MG/DL (ref 5–25)
CALCIUM SERPL-MCNC: 9.5 MG/DL (ref 8.3–10.1)
CHLORIDE SERPL-SCNC: 103 MMOL/L (ref 100–108)
CO2 SERPL-SCNC: 29 MMOL/L (ref 21–32)
CREAT SERPL-MCNC: 0.79 MG/DL (ref 0.6–1.3)
EOSINOPHIL # BLD AUTO: 0.17 THOUSAND/ΜL (ref 0–0.61)
EOSINOPHIL NFR BLD AUTO: 2 % (ref 0–6)
ERYTHROCYTE [DISTWIDTH] IN BLOOD BY AUTOMATED COUNT: 12.2 % (ref 11.6–15.1)
GFR SERPL CREATININE-BSD FRML MDRD: 102 ML/MIN/1.73SQ M
GLUCOSE SERPL-MCNC: 100 MG/DL (ref 65–140)
HCT VFR BLD AUTO: 41.9 % (ref 34.8–46.1)
HGB BLD-MCNC: 13.8 G/DL (ref 11.5–15.4)
IMM GRANULOCYTES # BLD AUTO: 0.02 THOUSAND/UL (ref 0–0.2)
IMM GRANULOCYTES NFR BLD AUTO: 0 % (ref 0–2)
LIPASE SERPL-CCNC: 62 U/L (ref 73–393)
LYMPHOCYTES # BLD AUTO: 2.64 THOUSANDS/ΜL (ref 0.6–4.47)
LYMPHOCYTES NFR BLD AUTO: 31 % (ref 14–44)
MCH RBC QN AUTO: 31.5 PG (ref 26.8–34.3)
MCHC RBC AUTO-ENTMCNC: 32.9 G/DL (ref 31.4–37.4)
MCV RBC AUTO: 96 FL (ref 82–98)
MONOCYTES # BLD AUTO: 0.69 THOUSAND/ΜL (ref 0.17–1.22)
MONOCYTES NFR BLD AUTO: 8 % (ref 4–12)
NEUTROPHILS # BLD AUTO: 4.85 THOUSANDS/ΜL (ref 1.85–7.62)
NEUTS SEG NFR BLD AUTO: 58 % (ref 43–75)
NRBC BLD AUTO-RTO: 0 /100 WBCS
PLATELET # BLD AUTO: 231 THOUSANDS/UL (ref 149–390)
PMV BLD AUTO: 10.7 FL (ref 8.9–12.7)
POTASSIUM SERPL-SCNC: 4 MMOL/L (ref 3.5–5.3)
PROT SERPL-MCNC: 7.9 G/DL (ref 6.4–8.2)
RBC # BLD AUTO: 4.38 MILLION/UL (ref 3.81–5.12)
SODIUM SERPL-SCNC: 140 MMOL/L (ref 136–145)
WBC # BLD AUTO: 8.42 THOUSAND/UL (ref 4.31–10.16)

## 2022-01-31 PROCEDURE — 83690 ASSAY OF LIPASE: CPT

## 2022-01-31 PROCEDURE — 76705 ECHO EXAM OF ABDOMEN: CPT

## 2022-01-31 PROCEDURE — 99284 EMERGENCY DEPT VISIT MOD MDM: CPT | Performed by: EMERGENCY MEDICINE

## 2022-01-31 PROCEDURE — 36415 COLL VENOUS BLD VENIPUNCTURE: CPT

## 2022-01-31 PROCEDURE — 99284 EMERGENCY DEPT VISIT MOD MDM: CPT

## 2022-01-31 PROCEDURE — 80053 COMPREHEN METABOLIC PANEL: CPT

## 2022-01-31 PROCEDURE — 85025 COMPLETE CBC W/AUTO DIFF WBC: CPT

## 2022-01-31 RX ORDER — SUCRALFATE 1 G/1
1 TABLET ORAL ONCE
Status: COMPLETED | OUTPATIENT
Start: 2022-01-31 | End: 2022-01-31

## 2022-01-31 RX ORDER — SUCRALFATE ORAL 1 G/10ML
1000 SUSPENSION ORAL ONCE
Status: DISCONTINUED | OUTPATIENT
Start: 2022-01-31 | End: 2022-01-31

## 2022-01-31 RX ORDER — OMEPRAZOLE 20 MG/1
20 CAPSULE, DELAYED RELEASE ORAL DAILY
Qty: 30 CAPSULE | Refills: 0 | Status: SHIPPED | OUTPATIENT
Start: 2022-01-31 | End: 2022-03-02

## 2022-01-31 RX ADMIN — SUCRALFATE 1 G: 1 TABLET ORAL at 19:48

## 2022-01-31 NOTE — PROGRESS NOTES
[facility]        NAME: Iesha Krishnan is a 27 y o  female  : 1995    MRN: [id number]  DATE: 2022  TIME: 7:05 PM    Assessment and Plan   Right upper quadrant abdominal pain [R10 11]  1  Right upper quadrant abdominal pain  famotidine (PEPCID) 20 mg tablet     Follow up PC for  RUQ US or go to ER  Patient Instructions       Follow up with PCP in 3-5 days  Proceed to  ER if symptoms worsen  Chief Complaint     Chief Complaint   Patient presents with    Abdominal Pain     pt c/o RUQ pain for 4 days, states it comes and goes, denies NVD         History of Present Illness       HPI   Patient presenting complaining of right abdominal pain just underneath her ribs ongoing for about 4 days now  Patient's pain is not related to food  She has never had her gallbladder removed  Patient denies any fevers or chills  She denies any change in bowel or bladder      Review of Systems   Review of Systems  Per hpi     Current Medications       Current Outpatient Medications:     famotidine (PEPCID) 20 mg tablet, Take 1 tablet (20 mg total) by mouth 2 (two) times a day, Disp: 30 tablet, Rfl: 0    LORazepam (ATIVAN) 0 5 mg tablet, Take by mouth every 8 (eight) hours as needed for anxiety (Patient not taking: Reported on 2022 ), Disp: , Rfl:     MULTIPLE VITAMIN PO, Take by mouth (Patient not taking: Reported on 2022 ), Disp: , Rfl:     Current Allergies     Allergies as of 2022    (No Known Allergies)            The following portions of the patient's history were reviewed and updated as appropriate: allergies, current medications, past family history, past medical history, past social history, past surgical history and problem list      Past Medical History:   Diagnosis Date    Anxiety     last assessed - [id number]    Chest pain     last assessed - [id number]    Continuous RUQ abdominal pain     last assessed - [id number]    External hemorrhoid     last assessed - [id number]    Finger injury     last assessed - [id number]    Varicella     childhood    Yeast vaginitis     last assessed - [id number]       Past Surgical History:   Procedure Laterality Date    DENTAL SURGERY      INDUCED       WISDOM TOOTH EXTRACTION         Family History   Problem Relation Age of Onset    No Known Problems Maternal Aunt     No Known Problems Mother     Colon cancer Cousin     Diabetes Other     No Known Problems Maternal Aunt     No Known Problems Father     No Known Problems Sister     No Known Problems Daughter     No Known Problems Maternal Grandmother     No Known Problems Maternal Grandfather     No Known Problems Paternal Grandmother     No Known Problems Paternal Grandfather     No Known Problems Paternal Aunt     No Known Problems Paternal Aunt     No Known Problems Paternal Aunt          Medications have been verified  Objective   /74   Pulse 88   Temp (!) 97 2 °F (36 2 °C)   Resp 20   LMP 2022 (Approximate)   SpO2 99%   Patient's last menstrual period was 2022 (approximate)  Physical Exam     Physical Exam  Constitutional:       General: She is not in acute distress  Appearance: Normal appearance  HENT:      Head: Normocephalic  Nose: No congestion or rhinorrhea  Mouth/Throat:      Mouth: Mucous membranes are moist       Pharynx: No oropharyngeal exudate or posterior oropharyngeal erythema  Eyes:      General:         Right eye: No discharge  Left eye: No discharge  Conjunctiva/sclera: Conjunctivae normal    Cardiovascular:      Rate and Rhythm: Normal rate and regular rhythm  Pulses: Normal pulses  Pulmonary:      Effort: Pulmonary effort is normal  No respiratory distress  Abdominal:      General: Abdomen is flat  There is no distension  Palpations: Abdomen is soft  There is no mass  Tenderness: There is abdominal tenderness  There is no guarding or rebound        Hernia: No hernia is present  Comments: Negative holley sign     Musculoskeletal:      Cervical back: Neck supple  Skin:     General: Skin is warm  Capillary Refill: Capillary refill takes less than 2 seconds  Neurological:      Mental Status: She is alert and oriented to person, place, and time

## 2022-01-31 NOTE — ED ATTENDING ATTESTATION
1/31/2022  IConnie MD, saw and evaluated the patient  I have discussed the patient with the resident/non-physician practitioner and agree with the resident's/non-physician practitioner's findings, Plan of Care, and MDM as documented in the resident's/non-physician practitioner's note, except where noted  All available labs and Radiology studies were reviewed  I was present for key portions of any procedure(s) performed by the resident/non-physician practitioner and I was immediately available to provide assistance  At this point I agree with the current assessment done in the Emergency Department  I have conducted an independent evaluation of this patient a history and physical is as follows:      70-year-old female presented for evaluation of right upper quadrant pain over the last 4-5 days  Pain has been relatively constant, mild intensity initially associated with some nausea but no vomiting  No specific exacerbating or improving factors  Denies any change in appetite or ability to eat  No worsening with eating  No fever, chills  No urinary symptoms  No diarrhea  No chest pain, shortness of breath  She has no abdominal surgical history  LMP was a few weeks ago  Was evaluated in urgent care few days ago and has been taking Pepcid without improvement  She was seen by Urology for microscopic hematuria and had a CT of the abdomen/pelvis with contrast last month which was unremarkable other than some constipation and left-sided diverticulosis  Vitals are normal   Abdomen soft and nontender  Differential diagnosis includes biliary disease, peptic ulcer disease, pancreatitis, viral illness  Plan labs, right upper quadrant ultrasound, symptomatic treatment and will re-evaluate  ED Course  ED Course as of 01/31/22 1933 Mon Jan 31, 2022 1933 Labs unremarkable  No acute findings on ultrasound  Incidental finding of small liver hemangioma    Will have follow-up with GI for further evaluation  Stable for discharge home      Critical Care Time  Procedures

## 2022-01-31 NOTE — ED PROVIDER NOTES
History  Chief Complaint   Patient presents with    Abdominal Pain     Ongoing abdominal pain x 5 days  Has not gotten worse, but has not gotten better  Pt reports nausea  Denies any vomiting or diarrhea  Pt denies any urinary complaints  27year old female with no significant past medical history presents today with RUQ pain x5 days associated with nausea  Patient describes an intermittent dull pressure-like sensation with no known associated provoking or relieving factors and is not associated with eating  Patient states the pain worsened last night, which caused her to go to urgent care where they prescribed Pepcid, without any relief of pain  Patient denies fevers, vomiting, diarrhea, decreased appetite, dysuria, vaginal bleeding or vaginal discharge  Patient states she has ongoing microscopic hematuria for which she is scheduled for cystoscopy next week  LMP 3 weeks ago  Patient states she drinks alcohol socially with no recent drinking binges  Patient has no other symptoms at this time, including chest pain or shortness of breath  Abdominal Pain  Associated symptoms: hematuria and nausea    Associated symptoms: no chest pain, no chills, no cough, no diarrhea, no dysuria, no fever, no shortness of breath, no sore throat and no vomiting        Prior to Admission Medications   Prescriptions Last Dose Informant Patient Reported? Taking?    LORazepam (ATIVAN) 0 5 mg tablet  Self Yes No   Sig: Take by mouth every 8 (eight) hours as needed for anxiety   Patient not taking: Reported on 1/30/2022    MULTIPLE VITAMIN PO   Yes No   Sig: Take by mouth   Patient not taking: Reported on 1/30/2022    famotidine (PEPCID) 20 mg tablet   No No   Sig: Take 1 tablet (20 mg total) by mouth 2 (two) times a day      Facility-Administered Medications: None       Past Medical History:   Diagnosis Date    Anxiety     last assessed - [id number]    Chest pain     last assessed - [id number]    Continuous RUQ abdominal pain last assessed - [id number]    External hemorrhoid     last assessed - [id number]    Finger injury     last assessed - 24Lzx0039    Varicella     childhood    Yeast vaginitis     last assessed - [id number]       Past Surgical History:   Procedure Laterality Date    DENTAL SURGERY      INDUCED       WISDOM TOOTH EXTRACTION         Family History   Problem Relation Age of Onset    No Known Problems Maternal Aunt     No Known Problems Mother     Colon cancer Cousin     Diabetes Other     No Known Problems Maternal Aunt     No Known Problems Father     No Known Problems Sister     No Known Problems Daughter     No Known Problems Maternal Grandmother     No Known Problems Maternal Grandfather     No Known Problems Paternal Grandmother     No Known Problems Paternal Grandfather     No Known Problems Paternal Aunt     No Known Problems Paternal Aunt     No Known Problems Paternal Aunt      I have reviewed and agree with the history as documented  E-Cigarette/Vaping    E-Cigarette Use Never User      E-Cigarette/Vaping Substances     Social History     Tobacco Use    Smoking status: Current Some Day Smoker     Types: Cigarettes     Last attempt to quit: 10/1/2019     Years since quittin 3    Smokeless tobacco: Never Used    Tobacco comment: 1 pack/week - working on quitting   Vaping Use    Vaping Use: Never used   Substance Use Topics    Alcohol use: Yes     Alcohol/week: 10 0 standard drinks     Types: 10 Standard drinks or equivalent per week     Comment: social     Drug use: No        Review of Systems   Constitutional: Negative for chills and fever  HENT: Negative for ear pain and sore throat  Eyes: Negative for pain and visual disturbance  Respiratory: Negative for cough and shortness of breath  Cardiovascular: Negative for chest pain and palpitations  Gastrointestinal: Positive for abdominal pain and nausea  Negative for diarrhea and vomiting     Genitourinary: Positive for hematuria  Negative for dysuria  Musculoskeletal: Negative for arthralgias and back pain  Skin: Negative for color change and rash  Neurological: Negative for seizures and syncope  All other systems reviewed and are negative  Physical Exam  ED Triage Vitals [01/31/22 1828]   Temperature Pulse Respirations Blood Pressure SpO2   97 9 °F (36 6 °C) 92 20 144/75 96 %      Temp Source Heart Rate Source Patient Position - Orthostatic VS BP Location FiO2 (%)   Oral Monitor Sitting Left arm --      Pain Score       8             Orthostatic Vital Signs  Vitals:    01/31/22 1828 01/31/22 2132   BP: 144/75 98/57   Pulse: 92 72   Patient Position - Orthostatic VS: Sitting Lying       Physical Exam  Vitals and nursing note reviewed  Constitutional:       General: She is not in acute distress  Appearance: She is well-developed  She is not ill-appearing  HENT:      Head: Normocephalic and atraumatic  Eyes:      Conjunctiva/sclera: Conjunctivae normal    Cardiovascular:      Rate and Rhythm: Normal rate and regular rhythm  Heart sounds: No murmur heard  Pulmonary:      Effort: Pulmonary effort is normal  No respiratory distress  Breath sounds: Normal breath sounds  Abdominal:      Palpations: Abdomen is soft  Tenderness: There is no abdominal tenderness  There is no guarding or rebound  Negative signs include Rivera's sign  Musculoskeletal:      Cervical back: Neck supple  Skin:     General: Skin is warm and dry  Neurological:      Mental Status: She is alert           ED Medications  Medications   sucralfate (CARAFATE) tablet 1 g (1 g Oral Given 1/31/22 1948)       Diagnostic Studies  Results Reviewed     Procedure Component Value Units Date/Time    Comprehensive metabolic panel [[id number]]  (Abnormal) Collected: 01/31/22 1858    Lab Status: Final result Specimen: Blood from Arm, Right Updated: 01/31/22 1923     Sodium 140 mmol/L      Potassium 4 0 mmol/L      Chloride 103 mmol/L      CO2 29 mmol/L      ANION GAP 8 mmol/L      BUN 16 mg/dL      Creatinine 0 79 mg/dL      Glucose 100 mg/dL      Calcium 9 5 mg/dL      AST 21 U/L      ALT 46 U/L      Alkaline Phosphatase 76 U/L      Total Protein 7 9 g/dL      Albumin 4 0 g/dL      Total Bilirubin 0 17 mg/dL      eGFR 102 ml/min/1 73sq m     Narrative:      [place] guidelines for Chronic Kidney Disease (CKD):     Stage 1 with normal or high GFR (GFR > 90 mL/min/1 73 square meters)    Stage 2 Mild CKD (GFR = 60-89 mL/min/1 73 square meters)    Stage 3A Moderate CKD (GFR = 45-59 mL/min/1 73 square meters)    Stage 3B Moderate CKD (GFR = 30-44 mL/min/1 73 square meters)    Stage 4 Severe CKD (GFR = 15-29 mL/min/1 73 square meters)    Stage 5 End Stage CKD (GFR <15 mL/min/1 73 square meters)  Note: GFR calculation is accurate only with a steady state creatinine    Lipase [[id number]]  (Abnormal) Collected: 01/31/22 1858    Lab Status: Final result Specimen: Blood from Arm, Right Updated: 01/31/22 1923     Lipase 62 u/L     CBC and differential [[id number]] Collected: 01/31/22 1858    Lab Status: Final result Specimen: Blood from Arm, Right Updated: 01/31/22 1906     WBC 8 42 Thousand/uL      RBC 4 38 Million/uL      Hemoglobin 13 8 g/dL      Hematocrit 41 9 %      MCV 96 fL      MCH 31 5 pg      MCHC 32 9 g/dL      RDW 12 2 %      MPV 10 7 fL      Platelets 347 Thousands/uL      nRBC 0 /100 WBCs      Neutrophils Relative 58 %      Immat GRANS % 0 %      Lymphocytes Relative 31 %      Monocytes Relative 8 %      Eosinophils Relative 2 %      Basophils Relative 1 %      Neutrophils Absolute 4 85 Thousands/µL      Immature Grans Absolute 0 02 Thousand/uL      Lymphocytes Absolute 2 64 Thousands/µL      Monocytes Absolute 0 69 Thousand/µL      Eosinophils Absolute 0 17 Thousand/µL      Basophils Absolute 0 05 Thousands/µL                   right upper quadrant   Final Result by Ulisses Reid MD (01/31 2128) Suspect 12 mm right liver lobe hemangioma  Workstation performed: [id number]               Procedures  Procedures      ED Course                             SBIRT 22yo+      Most Recent Value   SBIRT (25 yo +)    In order to provide better care to our patients, we are screening all of our patients for alcohol and drug use  Would it be okay to ask you these screening questions? No Filed at: 01/31/2022 2017                MDM  Number of Diagnoses or Management Options  Liver hemangioma  RUQ pain  Diagnosis management comments: 27year old female with no significant past medical history presents today with RUQ pain x5 days associated with nausea  Patient describes an intermittent dull pressure-like sensation with no known associated provoking or relieving factors and is not associated with eating  Patient states the pain worsened last night, which caused her to go to urgent care where they prescribed Pepcid, without any relief of pain  Patient denies fevers, vomiting, diarrhea, decreased appetite, dysuria, vaginal bleeding or vaginal discharge  On exam, patient is in no acute distress without any abdominal tenderness, rebound, guarding and with negative Rivera sign  Labs unremarkable  Patient does not report any improvement in pain following Carafate  US negative for gallbladder pathology but showed right liver lobe hemangioma  Patient was recommended GI follow up for further evaluation of her abdominal pain and for possible EGD  Recommended omeprazole for treatment of possible PUD  Discussed with patient incidental finding of hemangioma and recommended GI follow-up for monitoring  Reviewed return precautions  Patient expressed understanding          Amount and/or Complexity of Data Reviewed  Clinical lab tests: ordered and reviewed  Tests in the radiology section of CPT®: ordered and reviewed  Decide to obtain previous medical records or to obtain history from someone other than the patient: yes  Obtain history from someone other than the patient: yes  Review and summarize past medical records: yes    Risk of Complications, Morbidity, and/or Mortality  Presenting problems: moderate  Diagnostic procedures: moderate  Management options: low    Patient Progress  Patient progress: stable      Disposition  Final diagnoses:   RUQ pain   Liver hemangioma     Time reflects when diagnosis was documented in both MDM as applicable and the Disposition within this note     Time User Action Codes Description Comment    1/31/2022  9:38 PM Gerald Cordero [R10 11] RUQ pain     1/31/2022  9:38 PM Lai Moreira Add [D18 03] Liver hemangioma       ED Disposition     ED Disposition Condition Date/Time Comment    Discharge Stable Mon Jan 31, 2022  9:38 PM Kayley Segura discharge to home/self care              Follow-up Information     Follow up With Specialties Details Why Contact Info Additional Information    [facility] Emergency Medicine  If symptoms worsen [address] [facility] [address] [facility], [address], [place], [place], [address] [facility]  Please follow up with GI for further evaluation of your abdominal pain for possible EGD and for liver hemangioma [address] [facility] [address] [facility], [address] [facility] [address] [facility], [place], [place], [address]          Discharge Medication List as of 1/31/2022  9:40 PM      START taking these medications    Details   omeprazole (PriLOSEC) 20 mg delayed release capsule Take 1 capsule (20 mg total) by mouth daily, Starting Mon 1/31/2022, Until Wed 3/2/2022, Normal         CONTINUE these medications which have NOT CHANGED    Details   famotidine (PEPCID) 20 mg tablet Take 1 tablet (20 mg total) by mouth 2 (two) times a day, Starting Sun 1/30/2022, Normal      LORazepam (ATIVAN) 0 5 mg tablet Take by mouth every 8 (eight) hours as needed for anxiety, Historical Med      MULTIPLE VITAMIN PO Take by mouth, Historical Med           No discharge procedures on file  PDMP Review     None           ED Provider  Attending physically available and evaluated [facility]  I managed the patient along with the ED Attending      Electronically Signed by         Curtis Larkin MD  02/01/22 9780

## 2022-02-01 ENCOUNTER — PREP FOR PROCEDURE (OUTPATIENT)
Dept: GASTROENTEROLOGY | Facility: CLINIC | Age: 27
End: 2022-02-01

## 2022-02-01 ENCOUNTER — OFFICE VISIT (OUTPATIENT)
Dept: GASTROENTEROLOGY | Facility: CLINIC | Age: 27
End: 2022-02-01
Payer: COMMERCIAL

## 2022-02-01 VITALS
BODY MASS INDEX: 26.44 KG/M2 | WEIGHT: 149.2 LBS | HEIGHT: 63 IN | TEMPERATURE: 98.2 F | SYSTOLIC BLOOD PRESSURE: 98 MMHG | DIASTOLIC BLOOD PRESSURE: 57 MMHG

## 2022-02-01 DIAGNOSIS — K59.00 CONSTIPATION, UNSPECIFIED CONSTIPATION TYPE: ICD-10-CM

## 2022-02-01 DIAGNOSIS — R10.11 RIGHT UPPER QUADRANT ABDOMINAL PAIN: Primary | ICD-10-CM

## 2022-02-01 PROCEDURE — 99244 OFF/OP CNSLTJ NEW/EST MOD 40: CPT | Performed by: INTERNAL MEDICINE

## 2022-02-01 RX ORDER — DICYCLOMINE HYDROCHLORIDE 10 MG/1
10 CAPSULE ORAL 3 TIMES DAILY PRN
Qty: 60 CAPSULE | Refills: 0 | Status: SHIPPED | OUTPATIENT
Start: 2022-02-01 | End: 2022-06-22

## 2022-02-01 RX ORDER — POLYETHYLENE GLYCOL 3350 17 G/17G
17 POWDER, FOR SOLUTION ORAL DAILY
Qty: 510 G | Refills: 0 | Status: SHIPPED | OUTPATIENT
Start: 2022-02-01 | End: 2022-06-22

## 2022-02-01 NOTE — PATIENT INSTRUCTIONS
Scheduled date of EGD(as of today): 3/3/22  Physician performing EGD: Dr Santana  Location of EGD: [facility]  Instructions reviewed with patient by:  Lizy  Clearances: N/A

## 2022-02-01 NOTE — ED NOTES
Pt seen, assessed and discharged by physician without RN present  Pt ambulatory with steady gait  Alert and oriented x4  No complaints at this time  Please refer to physician assessment and plan of care        Enrique Leyden, RN  01/31/22 5248

## 2022-02-01 NOTE — PROGRESS NOTES
[facility] - Outpatient Consultation  [facility] 27 y o  female MRN: [id number]  Encounter: [id number]          ASSESSMENT AND PLAN:      1  Right upper quadrant abdominal pain  Non biliary pain, and unremarkable ultrasound recently  Other differentials include peptic ulcer disease, functional dyspepsia, functional biliary pain, related to increased stool burden given ongoing constipation such as IBS C  Given the intensity of her pain without improvement over the past week, will plan for upper endoscopy to rule out peptic ulcer disease and other causes for a significant pain  In the meantime she should start Prilosec 20 mg that was prescribed by the emergency department and I have counseled her on appropriate use 30-60 minutes before 1st meal of the day  - dicyclomine (BENTYL) 10 mg capsule; Take 1 capsule (10 mg total) by mouth 3 (three) times a day as needed (abdominal cramping)  Dispense: 60 capsule; Refill: 0    2  Constipation, unspecified constipation type  Longstanding constipation with having a bowel movement every other day and feeling of incomplete evacuation as well as straining  She does have occasional abdominal spasms that accompany this, somewhat relieved with bowel movements making this suspicious for IBS C  I have recommended MiraLax 17 g daily  In addition she will have Bentyl as needed for abdominal cramping   - polyethylene glycol (GLYCOLAX) 17 GM/SCOOP powder; Take 17 g by mouth daily  Dispense: 510 g; Refill: 0    Follow-up after procedure  ______________________________________________________________________    HPI:  MS Alfred Oconnell is a pleasant 28 y/o female presenting for evaluation of RUQ pain  She was referred by Dr Rhiannon Elizabeth  For over 1 week she has had persistent moderate, non-radiating right upper quadrant pain  This does not seem to be exacerbated by eating and she cannot identify any other triggers    She denies any musculoskeletal injuries to the area  She went to the ED yesterday for this complaint and had right upper quadrant ultrasound which was unremarkable aside from a 1 2 cm hemangioma  In addition I have reviewed her blood work from yesterday 2022 which demonstrates normal liver tests  She denies regular NSAID use  There is no family history of upper GI pathology  She did have a cousin  from colon cancer at age 34  Patient has had a colonoscopy in 2018 through [facility] which was unremarkable  REVIEW OF SYSTEMS:    CONSTITUTIONAL: Denies any fever, chills, rigors, and weight loss  HEENT: Denies odynophagia, tinnitus  CARDIOVASCULAR: No chest pain or palpitations  RESPIRATORY: Denies any cough, hemoptysis, shortness of breath or dyspnea on exertion  GASTROINTESTINAL: As noted in the History of Present Illness  GENITOURINARY: No problems with urination  Denies any hematuria or dysuria  NEUROLOGIC: No dizziness or vertigo, denies headaches  MUSCULOSKELETAL: Denies any muscle or joint pain  SKIN: Denies skin rashes or itching  ENDOCRINE:  Denies intolerance to heat or cold  PSYCHOSOCIAL: Denies depression or anxiety  Denies any recent memory loss         Historical Information   Past Medical History:   Diagnosis Date    Anxiety     last assessed - 2018    Chest pain     last assessed - [id number]    Continuous RUQ abdominal pain     last assessed - [id number]    External hemorrhoid     last assessed - [id number]    Finger injury     last assessed - [id number]    Varicella     childhood    Yeast vaginitis     last assessed - [id number]     Past Surgical History:   Procedure Laterality Date    COLONOSCOPY      DENTAL SURGERY      INDUCED       WISDOM TOOTH EXTRACTION       Social History   Social History     Substance and Sexual Activity   Alcohol Use Yes    Alcohol/week: 10 0 standard drinks    Types: 10 Standard drinks or equivalent per week    Comment: social      Social History Substance and Sexual Activity   Drug Use No     Social History     Tobacco Use   Smoking Status Current Some Day Smoker    Types: Cigarettes    Last attempt to quit: 10/1/2019    Years since quittin 3   Smokeless Tobacco Never Used   Tobacco Comment    1 pack/week - working on quitting     Family History   Problem Relation Age of Onset    No Known Problems Maternal Aunt     No Known Problems Mother     Colon cancer Cousin     Diabetes Other     No Known Problems Maternal Aunt     No Known Problems Father     No Known Problems Sister     No Known Problems Daughter     No Known Problems Maternal Grandmother     No Known Problems Maternal Grandfather     No Known Problems Paternal Grandmother     No Known Problems Paternal Grandfather     No Known Problems Paternal Aunt     No Known Problems Paternal Aunt     No Known Problems Paternal Aunt        Meds/Allergies       Current Outpatient Medications:     dicyclomine (BENTYL) 10 mg capsule    famotidine (PEPCID) 20 mg tablet    LORazepam (ATIVAN) 0 5 mg tablet    MULTIPLE VITAMIN PO    omeprazole (PriLOSEC) 20 mg delayed release capsule    polyethylene glycol (GLYCOLAX) 17 GM/SCOOP powder  No current facility-administered medications for this visit  No Known Allergies        Objective     Blood pressure 98/57, temperature 98 2 °F (36 8 °C), temperature source Tympanic, height 5' 3" (1 6 m), weight 67 7 kg (149 lb 3 2 oz), last menstrual period 2022, not currently breastfeeding  Body mass index is 26 43 kg/m²  PHYSICAL EXAM:      General Appearance:   Alert, cooperative, no distress   HEENT:   Normocephalic, atraumatic, anicteric  Neck:  Supple, symmetrical, trachea midline   Lungs:   Clear to auscultation bilaterally; no rales, rhonchi or wheezing; respirations unlabored    Heart[de-identified]   Regular rate and rhythm; no murmur, rub, or gallop     Abdomen:   Soft, non-tender, non-distended; normal bowel sounds; no masses, no organomegaly    Genitalia:   Deferred    Rectal:   Deferred    Extremities:  No cyanosis, clubbing or edema    Pulses:  2+ and symmetric    Skin:  No jaundice, rashes, or lesions          Lab Results:   No visits with results within 1 Day(s) from this visit  Latest known visit with results is:   Admission on 01/31/2022, Discharged on 01/31/2022   Component Date Value    WBC 01/31/2022 8 42     RBC 01/31/2022 4 38     Hemoglobin 01/31/2022 13 8     Hematocrit 01/31/2022 41 9     MCV 01/31/2022 96     MCH 01/31/2022 31 5     MCHC 01/31/2022 32 9     RDW 01/31/2022 12 2     MPV 01/31/2022 10 7     Platelets 97/46/2385 231     nRBC 01/31/2022 0     Neutrophils Relative 01/31/2022 58     Immat GRANS % 01/31/2022 0     Lymphocytes Relative 01/31/2022 31     Monocytes Relative 01/31/2022 8     Eosinophils Relative 01/31/2022 2     Basophils Relative 01/31/2022 1     Neutrophils Absolute 01/31/2022 4 85     Immature Grans Absolute 01/31/2022 0 02     Lymphocytes Absolute 01/31/2022 2 64     Monocytes Absolute 01/31/2022 0 69     Eosinophils Absolute 01/31/2022 0 17     Basophils Absolute 01/31/2022 0 05     Sodium 01/31/2022 140     Potassium 01/31/2022 4 0     Chloride 01/31/2022 103     CO2 01/31/2022 29     ANION GAP 01/31/2022 8     BUN 01/31/2022 16     Creatinine 01/31/2022 0 79     Glucose 01/31/2022 100     Calcium 01/31/2022 9 5     AST 01/31/2022 21     ALT 01/31/2022 46     Alkaline Phosphatase 01/31/2022 76     Total Protein 01/31/2022 7 9     Albumin 01/31/2022 4 0     Total Bilirubin 01/31/2022 0 17*    eGFR 01/31/2022 102     Lipase 01/31/2022 62*         Radiology Results:   US right upper quadrant    Result Date: 1/31/2022  Narrative: RIGHT UPPER QUADRANT ULTRASOUND INDICATION:     RUQ pain, nausea   COMPARISON:  None TECHNIQUE:   Real-time ultrasound of the right upper quadrant was performed with a curvilinear transducer with both volumetric sweeps and still imaging techniques  FINDINGS: PANCREAS:  Visualized portions of the pancreas are within normal limits  AORTA AND IVC:  Visualized portions are normal for patient age  LIVER: Size:  Within normal range  The liver measures 17 cm in the midclavicular line  Contour:  Surface contour is smooth  Parenchyma:  Echogenicity and echotexture are within normal limits  12 mm right liver lobe hyperechoic lesion most likely hemangioma Limited imaging of the main portal vein shows it to be patent and hepatopetal   BILIARY: No gallbladder findings  No intrahepatic biliary dilatation  CBD measures 4 1 mm  No choledocholithiasis  KIDNEY: Right kidney measures 11 2 cm  Within normal limits  ASCITES:   None  Impression: Suspect 12 mm right liver lobe hemangioma   Workstation performed: [id number]

## 2022-02-18 ENCOUNTER — ANESTHESIA (OUTPATIENT)
Dept: ANESTHESIOLOGY | Facility: HOSPITAL | Age: 27
End: 2022-02-18

## 2022-02-18 ENCOUNTER — ANESTHESIA EVENT (OUTPATIENT)
Dept: ANESTHESIOLOGY | Facility: HOSPITAL | Age: 27
End: 2022-02-18

## 2022-02-23 ENCOUNTER — TELEPHONE (OUTPATIENT)
Dept: GASTROENTEROLOGY | Facility: CLINIC | Age: 27
End: 2022-02-23

## 2022-02-23 NOTE — TELEPHONE ENCOUNTER
Patients GI provider:  Dr Sheri Weeks    Number to return call: [phone]    Reason for call: Pt calling asking if she were to reschedule her EGD, when the next available date would be   Pt asks that before changing you call her first     Scheduled procedure/appointment date if applicable: Procedure: 6/12/5005

## 2022-03-02 ENCOUNTER — TELEPHONE (OUTPATIENT)
Dept: GASTROENTEROLOGY | Facility: AMBULARY SURGERY CENTER | Age: 27
End: 2022-03-02

## 2022-04-07 ENCOUNTER — TELEPHONE (OUTPATIENT)
Dept: OBGYN CLINIC | Facility: CLINIC | Age: 27
End: 2022-04-07

## 2022-04-10 ENCOUNTER — HOSPITAL ENCOUNTER (EMERGENCY)
Facility: HOSPITAL | Age: 27
Discharge: HOME | End: 2022-04-10
Attending: EMERGENCY MEDICINE | Admitting: EMERGENCY MEDICINE
Payer: COMMERCIAL

## 2022-04-10 VITALS
HEART RATE: 93 BPM | SYSTOLIC BLOOD PRESSURE: 107 MMHG | RESPIRATION RATE: 16 BRPM | DIASTOLIC BLOOD PRESSURE: 66 MMHG | TEMPERATURE: 98.4 F | OXYGEN SATURATION: 97 %

## 2022-04-10 DIAGNOSIS — F41.9 ANXIETY: Primary | ICD-10-CM

## 2022-04-10 PROCEDURE — 93005 ELECTROCARDIOGRAM TRACING: CPT

## 2022-04-10 PROCEDURE — 99283 EMERGENCY DEPT VISIT LOW MDM: CPT

## 2022-04-10 PROCEDURE — 99284 EMERGENCY DEPT VISIT MOD MDM: CPT | Performed by: SURGERY

## 2022-04-10 RX ORDER — LORAZEPAM 1 MG/1
1 TABLET ORAL ONCE
Status: COMPLETED | OUTPATIENT
Start: 2022-04-10 | End: 2022-04-10

## 2022-04-10 RX ADMIN — LORAZEPAM 1 MG: 1 TABLET ORAL at 21:29

## 2022-04-11 NOTE — ED PROVIDER NOTES
History  Chief Complaint   Patient presents with    Hand Pain     C/o right hand numbness starting this AM  Neurovascular intact  Ernesto Doherty is a 27 y o  female with a pertinent PMHx of anxiety presenting today with palpitations and episode of anxiety  They patient reports that she has had panic attacks in the past and began to experience one earlier today beginning with palpitations around 3pm  Since that time, the patient has been experiencing anxiety which is getting progressively worse  Today's episodes feels similar to previous episodes of anxiety  Denies any chest pain, shortness of breath, dizziness, nausea, vomiting, diarrhea, fevers, chills, numbness, tingling, weakness in the extremities  + lightheadedness  C/o some right sided hand paresthesias, no numbness in the extremity, NV intact  No further complaints at this time            Prior to Admission Medications   Prescriptions Last Dose Informant Patient Reported? Taking?    LORazepam (ATIVAN) 0 5 mg tablet  Self Yes No   Sig: Take by mouth every 8 (eight) hours as needed for anxiety   Patient not taking: Reported on 1/30/2022    MULTIPLE VITAMIN PO   Yes No   Sig: Take by mouth   Patient not taking: Reported on 1/30/2022    dicyclomine (BENTYL) 10 mg capsule   No No   Sig: Take 1 capsule (10 mg total) by mouth 3 (three) times a day as needed (abdominal cramping)   famotidine (PEPCID) 20 mg tablet   No No   Sig: Take 1 tablet (20 mg total) by mouth 2 (two) times a day   Patient not taking: Reported on 2/1/2022    omeprazole (PriLOSEC) 20 mg delayed release capsule   No No   Sig: Take 1 capsule (20 mg total) by mouth daily   Patient not taking: Reported on 2/1/2022    polyethylene glycol (GLYCOLAX) 17 GM/SCOOP powder   No No   Sig: Take 17 g by mouth daily      Facility-Administered Medications: None       Past Medical History:   Diagnosis Date    Anxiety     last assessed - [id number]    Chest pain     last assessed - [id number]   Gray Alcazar Continuous RUQ abdominal pain     last assessed - [id number]    External hemorrhoid     last assessed - [id number]    Finger injury     last assessed - [id number]    Varicella     childhood    Yeast vaginitis     last assessed - [id number]       Past Surgical History:   Procedure Laterality Date    COLONOSCOPY      DENTAL SURGERY      INDUCED       WISDOM TOOTH EXTRACTION         Family History   Problem Relation Age of Onset    No Known Problems Maternal Aunt     No Known Problems Mother     Colon cancer Cousin     Diabetes Other     No Known Problems Maternal Aunt     No Known Problems Father     No Known Problems Sister     No Known Problems Daughter     No Known Problems Maternal Grandmother     No Known Problems Maternal Grandfather     No Known Problems Paternal Grandmother     No Known Problems Paternal Grandfather     No Known Problems Paternal Aunt     No Known Problems Paternal Aunt     No Known Problems Paternal Aunt      I have reviewed and agree with the history as documented  E-Cigarette/Vaping    E-Cigarette Use Never User      E-Cigarette/Vaping Substances     Social History     Tobacco Use    Smoking status: Current Some Day Smoker     Types: Cigarettes     Last attempt to quit: 10/1/2019     Years since quittin 5    Smokeless tobacco: Never Used    Tobacco comment: 1 pack/week - working on quitting   Vaping Use    Vaping Use: Never used   Substance Use Topics    Alcohol use: Yes     Alcohol/week: 10 0 standard drinks     Types: 10 Standard drinks or equivalent per week     Comment: social     Drug use: No       Review of Systems   Constitutional: Negative for activity change, chills, diaphoresis and fever  HENT: Negative for congestion, ear discharge, ear pain, rhinorrhea, sore throat and trouble swallowing  Eyes: Negative for pain, discharge, redness and visual disturbance     Respiratory: Negative for cough, chest tightness, shortness of breath and wheezing  Cardiovascular: Positive for palpitations  Negative for chest pain and leg swelling  Gastrointestinal: Negative for abdominal distention, abdominal pain, blood in stool, constipation, diarrhea, nausea and vomiting  Genitourinary: Negative for difficulty urinating, dysuria, flank pain, frequency, hematuria and urgency  Musculoskeletal: Negative for arthralgias, myalgias, neck pain and neck stiffness  Skin: Negative for color change and rash  Neurological: Negative for dizziness, syncope, facial asymmetry, weakness, light-headedness, numbness and headaches  Physical Exam  Physical Exam  Vitals reviewed  Constitutional:       General: She is not in acute distress  Appearance: Normal appearance  She is not ill-appearing  HENT:      Head: Normocephalic and atraumatic  Right Ear: Tympanic membrane normal       Left Ear: Tympanic membrane normal       Nose: Nose normal  No congestion or rhinorrhea  Mouth/Throat:      Mouth: Mucous membranes are moist       Pharynx: Oropharynx is clear  No oropharyngeal exudate or posterior oropharyngeal erythema  Eyes:      Extraocular Movements: Extraocular movements intact  Conjunctiva/sclera: Conjunctivae normal       Pupils: Pupils are equal, round, and reactive to light  Cardiovascular:      Rate and Rhythm: Normal rate and regular rhythm  Pulses: Normal pulses  Heart sounds: Normal heart sounds  Pulmonary:      Effort: Pulmonary effort is normal  No respiratory distress  Breath sounds: Normal breath sounds  No wheezing  Abdominal:      General: Abdomen is flat  Bowel sounds are normal  There is no distension  Palpations: Abdomen is soft  There is no mass  Tenderness: There is no abdominal tenderness  There is no right CVA tenderness, left CVA tenderness or guarding  Hernia: No hernia is present  Musculoskeletal:         General: No swelling, tenderness or deformity  Normal range of motion  Cervical back: Normal range of motion and neck supple  No rigidity or tenderness  Right lower leg: No edema  Left lower leg: No edema  Skin:     General: Skin is warm and dry  Capillary Refill: Capillary refill takes less than 2 seconds  Coloration: Skin is not jaundiced  Findings: No erythema or rash  Neurological:      General: No focal deficit present  Mental Status: She is alert and oriented to person, place, and time  Cranial Nerves: No cranial nerve deficit  Sensory: No sensory deficit  Motor: No weakness  Coordination: Coordination normal       Gait: Gait normal       Deep Tendon Reflexes: Reflexes normal          Vital Signs  ED Triage Vitals [04/10/22 2042]   Temperature Pulse Respirations Blood Pressure SpO2   98 4 °F (36 9 °C) 93 16 107/66 97 %      Temp Source Heart Rate Source Patient Position - Orthostatic VS BP Location FiO2 (%)   Oral Monitor Sitting Left arm --      Pain Score       No Pain           Vitals:    04/10/22 2042   BP: 107/66   Pulse: 93   Patient Position - Orthostatic VS: Sitting         Visual Acuity      ED Medications  Medications   LORazepam (ATIVAN) tablet 1 mg (1 mg Oral Given 4/10/22 2129)       Diagnostic Studies  Results Reviewed     None                 No orders to display              Procedures  ECG 12 Lead Documentation Only    Date/Time: 4/10/2022 9:30 PM  Performed by: Lady Kalin PA-C  Authorized by: Lady Kalin PA-C     Indications / Diagnosis:  Palpitations  ECG reviewed by me, the ED Provider: yes    Patient location:  ED  Previous ECG:     Previous ECG:  Compared to current    Comparison ECG info:  No significant change       Similarity:  No change    Comparison to cardiac monitor: No    Interpretation:     Interpretation: normal    Rate:     ECG rate:  85    ECG rate assessment: normal    Rhythm:     Rhythm: sinus rhythm    Ectopy:     Ectopy: none    QRS:     QRS axis:  Normal  Conduction: Conduction: normal    ST segments:     ST segments:  Normal  T waves:     T waves: normal               ED Course  ED Course as of 04/10/22 2149   Sun Apr 10, 2022   2139 Patient reevaluated - she is feeling slightly improved s/p treatment  Discussed results of EKG with her at bedside  Recommended close follow-up with PCP and psychiatry for anxiety  All question answered appropriately  She demonstrated understanding  MDM  Number of Diagnoses or Management Options  Anxiety: minor  Diagnosis management comments: Patient with a few hours of anxiety  Did not take her medication at home  She has not followed with her PCP for this problem  Described some paresthesias in the left hand  Denied any chest pain, shortness of breath, nausea, vomiting, diarrhea, diaphoresis, numbness in the extremities  She had symptoms like this in the past  Patient symptoms improved with benzodiazepines  Risk of Complications, Morbidity, and/or Mortality  Presenting problems: low  Diagnostic procedures: low  Management options: low    Patient Progress  Patient progress: improved      Disposition  Final diagnoses:   Anxiety     Time reflects when diagnosis was documented in both MDM as applicable and the Disposition within this note     Time User Action Codes Description Comment    4/10/2022  9:41 PM Betty Spine Add [F41 9] Anxiety       ED Disposition     ED Disposition Condition Date/Time Comment    Discharge Stable Sun Apr 10, 2022  9:41 PM [place] Advid discharge to home/self care              Follow-up Information     Follow up With Specialties Details Why Contact Info Additional [address] Family [facility] Schedule an appointment as soon as possible for a visit in 1 week  [facility] 24 [id number]       [facility] 107 Emergency Department Emergency Medicine Go to  If symptoms worsen [address] [place] [address] [facility] [address] [address] [facility] Emergency Department, [address], [place], [place], [address] Call  To schedule an appointment for follow-up  [address], [address], [place], [address] [phone]          Discharge Medication List as of 4/10/2022  9:43 PM      CONTINUE these medications which have NOT CHANGED    Details   dicyclomine (BENTYL) 10 mg capsule Take 1 capsule (10 mg total) by mouth 3 (three) times a day as needed (abdominal cramping), Starting Tue 2/1/2022, Normal      famotidine (PEPCID) 20 mg tablet Take 1 tablet (20 mg total) by mouth 2 (two) times a day, Starting Sun 1/30/2022, Normal      LORazepam (ATIVAN) 0 5 mg tablet Take by mouth every 8 (eight) hours as needed for anxiety, Historical Med      MULTIPLE VITAMIN PO Take by mouth, Historical Med      omeprazole (PriLOSEC) 20 mg delayed release capsule Take 1 capsule (20 mg total) by mouth daily, Starting Mon 1/31/2022, Until Wed 3/2/2022, Normal      polyethylene glycol (GLYCOLAX) 17 GM/SCOOP powder Take 17 g by mouth daily, Starting Tue 2/1/2022, Until Thu 3/3/2022, Normal             No discharge procedures on file      PDMP Review     None          ED Provider  Electronically Signed by           Hadley Marcos PA-C  04/10/22 9134

## 2022-04-14 LAB
ATRIAL RATE: 85 BPM
P AXIS: 72 DEGREES
PR INTERVAL: 138 MS
QRS AXIS: 87 DEGREES
QRSD INTERVAL: 80 MS
QT INTERVAL: 344 MS
QTC INTERVAL: 409 MS
T WAVE AXIS: 19 DEGREES
VENTRICULAR RATE: 85 BPM

## 2022-04-14 PROCEDURE — 93010 ELECTROCARDIOGRAM REPORT: CPT | Performed by: INTERNAL MEDICINE

## 2022-04-29 NOTE — TELEPHONE ENCOUNTER
Spoke to patient and rescheduled EGD  She requested Faustino she does not want to drive to [place] or [facility]    Scheduled for next available for 07/13/2022 with Dr Kay Cardona @ [facility]

## 2022-04-29 NOTE — TELEPHONE ENCOUNTER
Patients GI provider:  Dr Roly Acharya    Number to return call: ([phone])    Reason for call: Pt calling to reschedule EGD procedure    Scheduled procedure/appointment date if applicable: Apt/procedure 3/3/22

## 2022-05-09 ENCOUNTER — TELEPHONE (OUTPATIENT)
Dept: OBGYN CLINIC | Facility: CLINIC | Age: 27
End: 2022-05-09

## 2022-05-09 DIAGNOSIS — Z30.011 ENCOUNTER FOR INITIAL PRESCRIPTION OF CONTRACEPTIVE PILLS: Primary | ICD-10-CM

## 2022-05-09 NOTE — TELEPHONE ENCOUNTER
Patient calling in because she is interested in starting back up on her BC pill that she was previously taking  Would like to know if we can just call that in for her or if she would have to come in for an appointment

## 2022-05-09 NOTE — TELEPHONE ENCOUNTER
Reviewed in detail with patient, will restart junel 1/20 fe  Confirmed pharmacy with patient and scheduled annual exam  Rx sent to Sunny Miller to sign and pt aware will be available tomorrow

## 2022-05-10 RX ORDER — NORETHINDRONE ACETATE AND ETHINYL ESTRADIOL 1MG-20(21)
1 KIT ORAL DAILY
Qty: 28 TABLET | Refills: 3 | Status: SHIPPED | OUTPATIENT
Start: 2022-05-10 | End: 2022-08-04

## 2022-05-16 DIAGNOSIS — R10.11 RIGHT UPPER QUADRANT ABDOMINAL PAIN: ICD-10-CM

## 2022-05-16 RX ORDER — FAMOTIDINE 20 MG/1
20 TABLET, FILM COATED ORAL 2 TIMES DAILY
Qty: 30 TABLET | Refills: 1 | Status: SHIPPED | OUTPATIENT
Start: 2022-05-16 | End: 2022-07-11

## 2022-05-18 ENCOUNTER — APPOINTMENT (EMERGENCY)
Dept: RADIOLOGY | Facility: HOSPITAL | Age: 27
End: 2022-05-18
Payer: COMMERCIAL

## 2022-05-18 ENCOUNTER — HOSPITAL ENCOUNTER (EMERGENCY)
Facility: HOSPITAL | Age: 27
Discharge: HOME | End: 2022-05-18
Attending: EMERGENCY MEDICINE
Payer: COMMERCIAL

## 2022-05-18 ENCOUNTER — OFFICE VISIT (OUTPATIENT)
Dept: URGENT CARE | Age: 27
End: 2022-05-18
Payer: COMMERCIAL

## 2022-05-18 VITALS
DIASTOLIC BLOOD PRESSURE: 55 MMHG | TEMPERATURE: 98.3 F | BODY MASS INDEX: 26.48 KG/M2 | HEIGHT: 63 IN | WEIGHT: 149.47 LBS | SYSTOLIC BLOOD PRESSURE: 114 MMHG | RESPIRATION RATE: 18 BRPM | OXYGEN SATURATION: 99 % | HEART RATE: 114 BPM

## 2022-05-18 DIAGNOSIS — U07.1 COVID-19 VIRUS INFECTION: Primary | ICD-10-CM

## 2022-05-18 DIAGNOSIS — R50.9 FEVER, UNSPECIFIED FEVER CAUSE: Primary | ICD-10-CM

## 2022-05-18 LAB
ALBUMIN SERPL BCP-MCNC: 3.9 G/DL (ref 3.5–5)
ALP SERPL-CCNC: 46 U/L (ref 34–104)
ALT SERPL W P-5'-P-CCNC: 17 U/L (ref 7–52)
ANION GAP SERPL CALCULATED.3IONS-SCNC: 7 MMOL/L (ref 4–13)
AST SERPL W P-5'-P-CCNC: 14 U/L (ref 13–39)
BACTERIA UR QL AUTO: ABNORMAL /HPF
BASOPHILS # BLD AUTO: 0.02 THOUSANDS/ΜL (ref 0–0.1)
BASOPHILS NFR BLD AUTO: 0 % (ref 0–1)
BILIRUB SERPL-MCNC: 0.25 MG/DL (ref 0.2–1)
BILIRUB UR QL STRIP: NEGATIVE
BUN SERPL-MCNC: 13 MG/DL (ref 5–25)
CALCIUM SERPL-MCNC: 8.1 MG/DL (ref 8.4–10.2)
CARDIAC TROPONIN I PNL SERPL HS: <2 NG/L
CHLORIDE SERPL-SCNC: 110 MMOL/L (ref 96–108)
CLARITY UR: CLEAR
CO2 SERPL-SCNC: 23 MMOL/L (ref 21–32)
COLOR UR: YELLOW
CREAT SERPL-MCNC: 0.83 MG/DL (ref 0.6–1.3)
EOSINOPHIL # BLD AUTO: 0.03 THOUSAND/ΜL (ref 0–0.61)
EOSINOPHIL NFR BLD AUTO: 1 % (ref 0–6)
ERYTHROCYTE [DISTWIDTH] IN BLOOD BY AUTOMATED COUNT: 13.1 % (ref 11.6–15.1)
EXT PREG TEST URINE: NEGATIVE
EXT. CONTROL ED NAV: NORMAL
FLUAV RNA RESP QL NAA+PROBE: NEGATIVE
FLUBV RNA RESP QL NAA+PROBE: NEGATIVE
GFR SERPL CREATININE-BSD FRML MDRD: 95 ML/MIN/1.73SQ M
GLUCOSE SERPL-MCNC: 82 MG/DL (ref 65–140)
GLUCOSE UR STRIP-MCNC: NEGATIVE MG/DL
HCT VFR BLD AUTO: 39.5 % (ref 34.8–46.1)
HGB BLD-MCNC: 12.6 G/DL (ref 11.5–15.4)
HGB UR QL STRIP.AUTO: ABNORMAL
IMM GRANULOCYTES # BLD AUTO: 0.02 THOUSAND/UL (ref 0–0.2)
IMM GRANULOCYTES NFR BLD AUTO: 0 % (ref 0–2)
KETONES UR STRIP-MCNC: NEGATIVE MG/DL
LEUKOCYTE ESTERASE UR QL STRIP: NEGATIVE
LIPASE SERPL-CCNC: 19 U/L (ref 11–82)
LYMPHOCYTES # BLD AUTO: 0.37 THOUSANDS/ΜL (ref 0.6–4.47)
LYMPHOCYTES NFR BLD AUTO: 7 % (ref 14–44)
MCH RBC QN AUTO: 30.9 PG (ref 26.8–34.3)
MCHC RBC AUTO-ENTMCNC: 31.9 G/DL (ref 31.4–37.4)
MCV RBC AUTO: 97 FL (ref 82–98)
MONOCYTES # BLD AUTO: 0.55 THOUSAND/ΜL (ref 0.17–1.22)
MONOCYTES NFR BLD AUTO: 10 % (ref 4–12)
NEUTROPHILS # BLD AUTO: 4.3 THOUSANDS/ΜL (ref 1.85–7.62)
NEUTS SEG NFR BLD AUTO: 82 % (ref 43–75)
NITRITE UR QL STRIP: NEGATIVE
NON-SQ EPI CELLS URNS QL MICRO: ABNORMAL /HPF
NRBC BLD AUTO-RTO: 0 /100 WBCS
PH UR STRIP.AUTO: 7 [PH]
PLATELET # BLD AUTO: 160 THOUSANDS/UL (ref 149–390)
PMV BLD AUTO: 11.1 FL (ref 8.9–12.7)
POTASSIUM SERPL-SCNC: 3.6 MMOL/L (ref 3.5–5.3)
PROT SERPL-MCNC: 6.6 G/DL (ref 6.4–8.4)
PROT UR STRIP-MCNC: NEGATIVE MG/DL
RBC # BLD AUTO: 4.08 MILLION/UL (ref 3.81–5.12)
RBC #/AREA URNS AUTO: ABNORMAL /HPF
RSV RNA RESP QL NAA+PROBE: NEGATIVE
S PYO DNA THROAT QL NAA+PROBE: NOT DETECTED
SARS-COV-2 RNA RESP QL NAA+PROBE: POSITIVE
SODIUM SERPL-SCNC: 140 MMOL/L (ref 135–147)
SP GR UR STRIP.AUTO: 1.02 (ref 1–1.03)
UROBILINOGEN UR QL STRIP.AUTO: 0.2 E.U./DL
WBC # BLD AUTO: 5.29 THOUSAND/UL (ref 4.31–10.16)
WBC #/AREA URNS AUTO: ABNORMAL /HPF

## 2022-05-18 PROCEDURE — 36415 COLL VENOUS BLD VENIPUNCTURE: CPT

## 2022-05-18 PROCEDURE — S9083 URGENT CARE CENTER GLOBAL: HCPCS | Performed by: STUDENT IN AN ORGANIZED HEALTH CARE EDUCATION/TRAINING PROGRAM

## 2022-05-18 PROCEDURE — 71045 X-RAY EXAM CHEST 1 VIEW: CPT

## 2022-05-18 PROCEDURE — 85025 COMPLETE CBC W/AUTO DIFF WBC: CPT | Performed by: EMERGENCY MEDICINE

## 2022-05-18 PROCEDURE — 96374 THER/PROPH/DIAG INJ IV PUSH: CPT

## 2022-05-18 PROCEDURE — 0241U HB NFCT DS VIR RESP RNA 4 TRGT: CPT | Performed by: EMERGENCY MEDICINE

## 2022-05-18 PROCEDURE — 83690 ASSAY OF LIPASE: CPT | Performed by: EMERGENCY MEDICINE

## 2022-05-18 PROCEDURE — 84484 ASSAY OF TROPONIN QUANT: CPT | Performed by: EMERGENCY MEDICINE

## 2022-05-18 PROCEDURE — 99285 EMERGENCY DEPT VISIT HI MDM: CPT

## 2022-05-18 PROCEDURE — G0382 LEV 3 HOSP TYPE B ED VISIT: HCPCS | Performed by: STUDENT IN AN ORGANIZED HEALTH CARE EDUCATION/TRAINING PROGRAM

## 2022-05-18 PROCEDURE — 96361 HYDRATE IV INFUSION ADD-ON: CPT

## 2022-05-18 PROCEDURE — 87651 STREP A DNA AMP PROBE: CPT | Performed by: EMERGENCY MEDICINE

## 2022-05-18 PROCEDURE — 81001 URINALYSIS AUTO W/SCOPE: CPT | Performed by: EMERGENCY MEDICINE

## 2022-05-18 PROCEDURE — 80053 COMPREHEN METABOLIC PANEL: CPT | Performed by: EMERGENCY MEDICINE

## 2022-05-18 PROCEDURE — 81025 URINE PREGNANCY TEST: CPT | Performed by: EMERGENCY MEDICINE

## 2022-05-18 PROCEDURE — 93005 ELECTROCARDIOGRAM TRACING: CPT

## 2022-05-18 PROCEDURE — 99285 EMERGENCY DEPT VISIT HI MDM: CPT | Performed by: EMERGENCY MEDICINE

## 2022-05-18 RX ORDER — AMOXICILLIN AND CLAVULANATE POTASSIUM 875; 125 MG/1; MG/1
1 TABLET, FILM COATED ORAL EVERY 12 HOURS SCHEDULED
Qty: 14 TABLET | Refills: 0 | Status: SHIPPED | OUTPATIENT
Start: 2022-05-18 | End: 2022-05-25

## 2022-05-18 RX ORDER — KETOROLAC TROMETHAMINE 30 MG/ML
15 INJECTION, SOLUTION INTRAMUSCULAR; INTRAVENOUS ONCE
Status: COMPLETED | OUTPATIENT
Start: 2022-05-18 | End: 2022-05-18

## 2022-05-18 RX ADMIN — SODIUM CHLORIDE 1000 ML: 0.9 INJECTION, SOLUTION INTRAVENOUS at 21:26

## 2022-05-18 RX ADMIN — KETOROLAC TROMETHAMINE 15 MG: 30 INJECTION, SOLUTION INTRAMUSCULAR at 21:26

## 2022-05-18 NOTE — PROGRESS NOTES
[facility]        NAME: Romel Farmer is a 32 y o  female  : 1995    MRN: [id number]  DATE: May 18, 2022  TIME: 6:55 PM    Assessment and Plan   Fever, unspecified fever cause [R50 9]  1  Fever, unspecified fever cause  amoxicillin-clavulanate (AUGMENTIN) 875-125 mg per tablet         Patient Instructions       Follow up with PCP in 3-5 days  Proceed to  ER if symptoms worsen  Chief Complaint   No chief complaint on file  History of Present Illness       HPI   Patient presents today complaining of fever congestion sinus pain pressure and a cough that has been going on for the past few days  Patient states that she is vaccinated for COVID-19, denies any sick contacts    Patient denies any change    Review of Systems   Review of Systems  pe rhpi     Current Medications       Current Outpatient Medications:     amoxicillin-clavulanate (AUGMENTIN) 875-125 mg per tablet, Take 1 tablet by mouth every 12 (twelve) hours for 7 days, Disp: 14 tablet, Rfl: 0    dicyclomine (BENTYL) 10 mg capsule, Take 1 capsule (10 mg total) by mouth 3 (three) times a day as needed (abdominal cramping), Disp: 60 capsule, Rfl: 0    famotidine (PEPCID) 20 mg tablet, Take 1 tablet (20 mg total) by mouth in the morning and 1 tablet (20 mg total) in the evening , Disp: 30 tablet, Rfl: 1    LORazepam (ATIVAN) 0 5 mg tablet, Take by mouth every 8 (eight) hours as needed for anxiety (Patient not taking: Reported on 2022 ), Disp: , Rfl:     MULTIPLE VITAMIN PO, Take by mouth (Patient not taking: Reported on 2022 ), Disp: , Rfl:     norethindrone-ethinyl estradiol (Junel FE 1/20) 1-20 MG-MCG per tablet, Take 1 tablet by mouth daily, Disp: 28 tablet, Rfl: 3    omeprazole (PriLOSEC) 20 mg delayed release capsule, Take 1 capsule (20 mg total) by mouth daily (Patient not taking: Reported on 2022 ), Disp: 30 capsule, Rfl: 0    polyethylene glycol (GLYCOLAX) 17 GM/SCOOP powder, Take 17 g by mouth daily, Disp: 510 g, Rfl: 0    Current Allergies     Allergies as of 2022    (No Known Allergies)            The following portions of the patient's history were reviewed and updated as appropriate: allergies, current medications, past family history, past medical history, past social history, past surgical history and problem list      Past Medical History:   Diagnosis Date    Anxiety     last assessed - 2018    Chest pain     last assessed - [id number]    Continuous RUQ abdominal pain     last assessed - [id number]    External hemorrhoid     last assessed - [id number]    Finger injury     last assessed - [id number]    Varicella     childhood    Yeast vaginitis     last assessed - 2017       Past Surgical History:   Procedure Laterality Date    COLONOSCOPY      DENTAL SURGERY      INDUCED       WISDOM TOOTH EXTRACTION         Family History   Problem Relation Age of Onset    No Known Problems Maternal Aunt     No Known Problems Mother     Colon cancer Cousin     Diabetes Other     No Known Problems Maternal Aunt     No Known Problems Father     No Known Problems Sister     No Known Problems Daughter     No Known Problems Maternal Grandmother     No Known Problems Maternal Grandfather     No Known Problems Paternal Grandmother     No Known Problems Paternal Grandfather     No Known Problems Paternal Aunt     No Known Problems Paternal Aunt     No Known Problems Paternal Aunt          Medications have been verified  Objective   There were no vitals taken for this visit  No LMP recorded  Physical Exam     Physical Exam  Constitutional:       General: She is not in acute distress  Appearance: Normal appearance  HENT:      Head: Normocephalic  Nose: Congestion present  No rhinorrhea  Mouth/Throat:      Mouth: Mucous membranes are moist       Pharynx: Posterior oropharyngeal erythema present  No oropharyngeal exudate        Comments: Tender max sinuses Eyes:      General:         Right eye: No discharge  Left eye: No discharge  Conjunctiva/sclera: Conjunctivae normal    Cardiovascular:      Rate and Rhythm: Normal rate and regular rhythm  Pulses: Normal pulses  Pulmonary:      Effort: Pulmonary effort is normal  No respiratory distress  Abdominal:      General: Abdomen is flat  There is no distension  Palpations: Abdomen is soft  Tenderness: There is no abdominal tenderness  Musculoskeletal:      Cervical back: Neck supple  Skin:     General: Skin is warm  Capillary Refill: Capillary refill takes less than 2 seconds  Neurological:      Mental Status: She is alert and oriented to person, place, and time

## 2022-05-18 NOTE — Clinical Note
Stefan Navas was seen and treated in our emergency department on 5/18/2022  Diagnosis:     Alex Jean-Baptiste  may return to work on return date  She may return on this date: 05/24/2022         If you have any questions or concerns, please don't hesitate to call        Armond Sierra DO    ______________________________           _______________          _______________  Hospital Representative                              Date                                Time

## 2022-05-18 NOTE — LETTER
May 18, 2022     Patient: Anurag Miteshanthony   YOB: 1995   Date of Visit: 5/18/2022       To Whom It May Concern: It is my medical opinion that Belen Garibay be excused from work duties on 05/19 and 5/20 of the year 2022  If you have any questions or concerns, please don't hesitate to call           Sincerely,        Justin Jean-Baptiste MD    CC: No Recipients

## 2022-05-18 NOTE — Clinical Note
Owendo Norwood was seen and treated in our emergency department on 5/18/2022  Diagnosis:     Echo Mcgovern  may return to work on return date  She may return on this date: 05/24/2022         If you have any questions or concerns, please don't hesitate to call        Vivien Herrera, DO    ______________________________           _______________          _______________  Hospital Representative                              Date                                Time

## 2022-05-19 ENCOUNTER — TELEPHONE (OUTPATIENT)
Dept: FAMILY MEDICINE CLINIC | Facility: CLINIC | Age: 27
End: 2022-05-19

## 2022-05-19 NOTE — TELEPHONE ENCOUNTER
----- Message from Stephenie Nguyen DO sent at 5/18/2022 11:48 PM EDT -----  Hello this patient tested positive for COVID today  - symptoms started 5/18/22  She is low risk for poor outcome and she has been previously vaccinated  I'm not sure if she would be a candidate for monoclonal antibody      Thank you,   Roselyn Forrest

## 2022-05-19 NOTE — TELEPHONE ENCOUNTER
Patient returned by call  States she is "hanging in there" per patient she is going to give her pcp a call

## 2022-05-19 NOTE — TELEPHONE ENCOUNTER
Left message  Stressed the importance on calling her pcp to further discuss treatment plan  Advised patient to give me a call once she received my message  Saige Salomon

## 2022-05-19 NOTE — ED PROVIDER NOTES
History  Chief Complaint   Patient presents with    Chest Pain     Patient c/o headache, fever, chest pain, cough, and diarrhea  Denies SOB, and cough  29-year-old female presents the emergency department for evaluation of generalized body aches, mild cough, fever, headache, chest pain  Patient states that she woke up this morning at 5:30 a m  Feeling unwell with headache and fever of 100 2  Patient has had some sinus pressure and congestion  She has mild sore throat  Patient notes that her daughter was recently diagnosed with strep throat  Patient has been tolerating a normal diet she denies nausea vomiting but did have a few episodes of diarrhea  Patient denies associated abdominal pain  Last menstrual period was 2 weeks ago and normal   No dysuria or hematuria  Patient has been vaccinated for COVID      History provided by:  Patient and medical records   used: No    Flu Symptoms  Presenting symptoms: cough, diarrhea, fatigue, fever, headache and myalgias    Presenting symptoms: no shortness of breath and no vomiting    Severity:  Moderate  Onset quality:  Gradual  Progression:  Worsening  Chronicity:  New  Relieved by:  Nothing  Worsened by:  Nothing  Ineffective treatments:  OTC medications  Associated symptoms: decreased appetite and nasal congestion    Associated symptoms: no ear pain    Risk factors: no diabetes problem and not pregnant        Prior to Admission Medications   Prescriptions Last Dose Informant Patient Reported? Taking?    LORazepam (ATIVAN) 0 5 mg tablet  Self Yes No   Sig: Take by mouth every 8 (eight) hours as needed for anxiety   Patient not taking: Reported on 1/30/2022    MULTIPLE VITAMIN PO   Yes No   Sig: Take by mouth   Patient not taking: Reported on 1/30/2022    amoxicillin-clavulanate (AUGMENTIN) 875-125 mg per tablet   No No   Sig: Take 1 tablet by mouth every 12 (twelve) hours for 7 days   dicyclomine (BENTYL) 10 mg capsule   No No   Sig: Take 1 capsule (10 mg total) by mouth 3 (three) times a day as needed (abdominal cramping)   famotidine (PEPCID) 20 mg tablet   No No   Sig: Take 1 tablet (20 mg total) by mouth in the morning and 1 tablet (20 mg total) in the evening  norethindrone-ethinyl estradiol (Junel FE 1/20) 1-20 MG-MCG per tablet   No No   Sig: Take 1 tablet by mouth daily   omeprazole (PriLOSEC) 20 mg delayed release capsule   No No   Sig: Take 1 capsule (20 mg total) by mouth daily   Patient not taking: Reported on 2022    polyethylene glycol (GLYCOLAX) 17 GM/SCOOP powder   No No   Sig: Take 17 g by mouth daily      Facility-Administered Medications: None       Past Medical History:   Diagnosis Date    Anxiety     last assessed - [id number]    Chest pain     last assessed - [id number]    Continuous RUQ abdominal pain     last assessed - [id number]    External hemorrhoid     last assessed - [id number]    Finger injury     last assessed - [id number]    Varicella     childhood    Yeast vaginitis     last assessed - [id number]       Past Surgical History:   Procedure Laterality Date    COLONOSCOPY      DENTAL SURGERY      INDUCED       WISDOM TOOTH EXTRACTION         Family History   Problem Relation Age of Onset    No Known Problems Maternal Aunt     No Known Problems Mother     Colon cancer Cousin     Diabetes Other     No Known Problems Maternal Aunt     No Known Problems Father     No Known Problems Sister     No Known Problems Daughter     No Known Problems Maternal Grandmother     No Known Problems Maternal Grandfather     No Known Problems Paternal Grandmother     No Known Problems Paternal Grandfather     No Known Problems Paternal Aunt     No Known Problems Paternal Aunt     No Known Problems Paternal Aunt      I have reviewed and agree with the history as documented      E-Cigarette/Vaping    E-Cigarette Use Never User      E-Cigarette/Vaping Substances     Social History     Tobacco Use    Smoking status: Current Some Day Smoker     Types: Cigarettes     Last attempt to quit: 10/1/2019     Years since quittin 6    Smokeless tobacco: Never Used    Tobacco comment: 1 pack/week - working on quitting   Vaping Use    Vaping Use: Never used   Substance Use Topics    Alcohol use: Yes     Alcohol/week: 10 0 standard drinks     Types: 10 Standard drinks or equivalent per week     Comment: social     Drug use: No       Review of Systems   Constitutional: Positive for decreased appetite, fatigue and fever  HENT: Positive for congestion and sinus pain  Negative for ear pain  Respiratory: Positive for cough and chest tightness  Negative for shortness of breath  Cardiovascular: Positive for chest pain  Gastrointestinal: Positive for diarrhea  Negative for abdominal pain and vomiting  Genitourinary: Negative for dysuria  Musculoskeletal: Positive for myalgias  Skin: Negative for rash  Neurological: Positive for headaches  Negative for weakness  Physical Exam  Physical Exam  Vitals and nursing note reviewed  Constitutional:       General: She is not in acute distress  Appearance: She is well-developed  She is ill-appearing  She is not toxic-appearing  HENT:      Head: Normocephalic  Nose: Nose normal       Mouth/Throat:      Pharynx: No oropharyngeal exudate  Eyes:      Conjunctiva/sclera: Conjunctivae normal       Pupils: Pupils are equal, round, and reactive to light  Cardiovascular:      Rate and Rhythm: Regular rhythm  Tachycardia present  Heart sounds: Normal heart sounds  Pulmonary:      Effort: Pulmonary effort is normal  No respiratory distress  Breath sounds: Normal breath sounds  No wheezing or rhonchi  Abdominal:      General: Bowel sounds are normal  There is no distension  Palpations: Abdomen is soft  Tenderness: There is no abdominal tenderness  There is no guarding or rebound     Musculoskeletal:         General: No tenderness or deformity  Normal range of motion  Cervical back: Normal range of motion and neck supple  Right lower leg: No tenderness  Left lower leg: No tenderness  Lymphadenopathy:      Cervical: No cervical adenopathy  Skin:     General: Skin is warm and dry  Capillary Refill: Capillary refill takes less than 2 seconds  Findings: No rash  Neurological:      General: No focal deficit present  Mental Status: She is alert and oriented to person, place, and time  Cranial Nerves: No cranial nerve deficit  Sensory: No sensory deficit  Motor: No abnormal muscle tone  Coordination: Coordination normal       Gait: Gait normal       Deep Tendon Reflexes: Reflexes are normal and symmetric  Psychiatric:         Behavior: Behavior normal          Thought Content:  Thought content normal          Judgment: Judgment normal          Vital Signs  ED Triage Vitals [05/18/22 2002]   Temperature Pulse Respirations Blood Pressure SpO2   98 3 °F (36 8 °C) (!) 114 18 114/55 99 %      Temp Source Heart Rate Source Patient Position - Orthostatic VS BP Location FiO2 (%)   Oral Monitor Sitting Left arm --      Pain Score       7           Vitals:    05/18/22 2002   BP: 114/55   Pulse: (!) 114   Patient Position - Orthostatic VS: Sitting         Visual Acuity      ED Medications  Medications   sodium chloride 0 9 % bolus 1,000 mL (0 mL Intravenous Stopped 5/18/22 2246)   ketorolac (TORADOL) injection 15 mg (15 mg Intravenous Given 5/18/22 2126)       Diagnostic Studies  Results Reviewed     Procedure Component Value Units Date/Time    Urine Microscopic [[id number]]  (Abnormal) Collected: 05/18/22 2042    Lab Status: Final result Specimen: Urine, Clean Catch Updated: 05/18/22 2343     RBC, UA 4-10 /hpf      WBC, UA 0-1 /hpf      Epithelial Cells Occasional /hpf      Bacteria, UA Occasional /hpf     COVID/FLU/RSV - 2 hour TAT [[id number]]  (Abnormal) Collected: 05/18/22 2124    Lab Status: Final result Specimen: Nares from Nose Updated: 05/18/22 6056     SARS-CoV-2 Positive     INFLUENZA A PCR Negative     INFLUENZA B PCR Negative     RSV PCR Negative    Narrative:      FOR PEDIATRIC PATIENTS - copy/paste COVID Guidelines URL to browser: [email or web address]    SARS-CoV-2 assay is a Nucleic Acid Amplification assay intended for the  qualitative detection of nucleic acid from SARS-CoV-2 in nasopharyngeal  swabs  Results are for the presumptive identification of SARS-CoV-2 RNA  Positive results are indicative of infection with SARS-CoV-2, the virus  causing COVID-19, but do not rule out bacterial infection or co-infection  with other viruses  Laboratories within the [place] and its  territories are required to report all positive results to the appropriate  public health authorities  Negative results do not preclude SARS-CoV-2  infection and should not be used as the sole basis for treatment or other  patient management decisions  Negative results must be combined with  clinical observations, patient history, and epidemiological information  This test has not been FDA cleared or approved  This test has been authorized by FDA under an Emergency Use Authorization  (EUA)  This test is only authorized for the duration of time the  declaration that circumstances exist justifying the authorization of the  emergency use of an in vitro diagnostic tests for detection of SARS-CoV-2  virus and/or diagnosis of COVID-19 infection under section 564(b)(1) of  the Act, 21 U  S C  283CYM-9(S)(6), unless the authorization is terminated  or revoked sooner  The test has been validated but independent review by FDA  and CLIA is pending  Test performed using [employer] GeneXpert: This RT-PCR assay targets N2,  a region unique to SARS-CoV-2  A conserved region in the E-gene was chosen  for pan-Sarbecovirus detection which includes SARS-CoV-2      Strep A PCR [[id number]]  (Normal) Collected: 05/18/22 2124    Lab Status: Final result Specimen: Throat Updated: 05/18/22 2212     STREP A PCR Not Detected    UA (URINE) with reflex to Scope [[id number]]  (Abnormal) Collected: 05/18/22 2042    Lab Status: Final result Specimen: Urine, Clean Catch Updated: 05/18/22 2143     Color, UA Yellow     Clarity, UA Clear     Specific Ivel, UA 1 020     pH, UA 7 0     Leukocytes, UA Negative     Nitrite, UA Negative     Protein, UA Negative mg/dl      Glucose, UA Negative mg/dl      Ketones, UA Negative mg/dl      Urobilinogen, UA 0 2 E U /dl      Bilirubin, UA Negative     Blood, UA Small    HS Troponin 0hr (reflex protocol) [[id number]]  (Normal) Collected: 05/18/22 2041    Lab Status: Final result Specimen: Blood from Arm, Right Updated: 05/18/22 2120     hs TnI 0hr <2 ng/L     Comprehensive metabolic panel [[id number]]  (Abnormal) Collected: 05/18/22 2041    Lab Status: Final result Specimen: Blood from Arm, Right Updated: 05/18/22 2110     Sodium 140 mmol/L      Potassium 3 6 mmol/L      Chloride 110 mmol/L      CO2 23 mmol/L      ANION GAP 7 mmol/L      BUN 13 mg/dL      Creatinine 0 83 mg/dL      Glucose 82 mg/dL      Calcium 8 1 mg/dL      AST 14 U/L      ALT 17 U/L      Alkaline Phosphatase 46 U/L      Total Protein 6 6 g/dL      Albumin 3 9 g/dL      Total Bilirubin 0 25 mg/dL      eGFR 95 ml/min/1 73sq m     Narrative:      Brady guidelines for Chronic Kidney Disease (CKD):     Stage 1 with normal or high GFR (GFR > 90 mL/min/1 73 square meters)    Stage 2 Mild CKD (GFR = 60-89 mL/min/1 73 square meters)    Stage 3A Moderate CKD (GFR = 45-59 mL/min/1 73 square meters)    Stage 3B Moderate CKD (GFR = 30-44 mL/min/1 73 square meters)    Stage 4 Severe CKD (GFR = 15-29 mL/min/1 73 square meters)    Stage 5 End Stage CKD (GFR <15 mL/min/1 73 square meters)  Note: GFR calculation is accurate only with a steady state creatinine    Lipase [[id number]] (Normal) Collected: 05/18/22 2041    Lab Status: Final result Specimen: Blood from Arm, Right Updated: 05/18/22 2110     Lipase 19 u/L     CBC and differential [[id number]]  (Abnormal) Collected: 05/18/22 2041    Lab Status: Final result Specimen: Blood from Arm, Right Updated: 05/18/22 2057     WBC 5 29 Thousand/uL      RBC 4 08 Million/uL      Hemoglobin 12 6 g/dL      Hematocrit 39 5 %      MCV 97 fL      MCH 30 9 pg      MCHC 31 9 g/dL      RDW 13 1 %      MPV 11 1 fL      Platelets 315 Thousands/uL      nRBC 0 /100 WBCs      Neutrophils Relative 82 %      Immat GRANS % 0 %      Lymphocytes Relative 7 %      Monocytes Relative 10 %      Eosinophils Relative 1 %      Basophils Relative 0 %      Neutrophils Absolute 4 30 Thousands/µL      Immature Grans Absolute 0 02 Thousand/uL      Lymphocytes Absolute 0 37 Thousands/µL      Monocytes Absolute 0 55 Thousand/µL      Eosinophils Absolute 0 03 Thousand/µL      Basophils Absolute 0 02 Thousands/µL     POCT pregnancy, urine [[id number]]  (Normal) Resulted: 05/18/22 2054    Lab Status: Final result Specimen: Urine Updated: 05/18/22 2054     EXT PREG TEST UR (Ref: Negative) negative     Control valid                 XR chest 1 view portable   ED Interpretation by Shree Bravo DO (05/18 2158)   No acute disease                 Procedures  ECG 12 Lead Documentation Only    Date/Time: 5/18/2022 11:42 PM  Performed by: Shree Bravo DO  Authorized by: Shree Bravo DO     Indications / Diagnosis:  Chest pain cough  ECG reviewed by me, the ED Provider: yes    Patient location:  ED  Previous ECG:     Previous ECG:  Unavailable  Interpretation:     Interpretation: normal    Rate:     ECG rate:  92    ECG rate assessment: normal    Rhythm:     Rhythm: sinus rhythm    Ectopy:     Ectopy: none    QRS:     QRS axis:  Normal  Conduction:     Conduction: normal    ST segments:     ST segments:  Normal  T waves:     T waves: normal               ED Course MDM  Number of Diagnoses or Management Options  COVID-19 virus infection: new and requires workup     Amount and/or Complexity of Data Reviewed  Clinical lab tests: ordered and reviewed  Tests in the radiology section of CPT®: ordered and reviewed  Tests in the medicine section of CPT®: ordered and reviewed  Decide to obtain previous medical records or to obtain history from someone other than the patient: yes  Independent visualization of images, tracings, or specimens: yes    Risk of Complications, Morbidity, and/or Mortality  General comments: 17-year-old female presents with fever body aches cough chest pain and malaise, symptoms started this morning  Patient tested positive for COVID-19 while in the department  The remainder of her workup is reassuring  She has a normal EKG, chest x-ray and lab work  Patient offered antiviral medication and she declined  I did send a message to the COVID response team regarding possible monoclonal antibody  The patient has been vaccinated  She is not requiring oxygen while in the department  We discussed supportive care at home and signs symptoms return to the emergency department    Patient Progress  Patient progress: stable      Disposition  Final diagnoses:   COVID-19 virus infection     Time reflects when diagnosis was documented in both MDM as applicable and the Disposition within this note     Time User Action Codes Description Comment    5/18/2022 10:26 PM [facility], Roselyn Add [U07 1] COVID-19 virus infection       ED Disposition     ED Disposition   Discharge    Condition   Stable    Date/Time   Wed May 18, 2022 10:25 PM    Comment   David Brennan discharge to home/self care                 Follow-up Information     Follow up With Specialties Details Why Contact Info Additional [address] Emergency Department Emergency Medicine  As needed, If symptoms worsen [address] [address] Counts include 234 beds at the [facility] [address] [address] [facility] Emergency Department, [address], [facility], [place], [address]          Discharge Medication List as of 5/18/2022 10:38 PM      CONTINUE these medications which have NOT CHANGED    Details   amoxicillin-clavulanate (AUGMENTIN) 875-125 mg per tablet Take 1 tablet by mouth every 12 (twelve) hours for 7 days, Starting Wed 5/18/2022, Until Wed 5/25/2022, Normal      dicyclomine (BENTYL) 10 mg capsule Take 1 capsule (10 mg total) by mouth 3 (three) times a day as needed (abdominal cramping), Starting Tue 2/1/2022, Normal      famotidine (PEPCID) 20 mg tablet Take 1 tablet (20 mg total) by mouth in the morning and 1 tablet (20 mg total) in the evening , Starting Mon 5/16/2022, Normal      LORazepam (ATIVAN) 0 5 mg tablet Take by mouth every 8 (eight) hours as needed for anxiety, Historical Med      MULTIPLE VITAMIN PO Take by mouth, Historical Med      norethindrone-ethinyl estradiol (Junel FE 1/20) 1-20 MG-MCG per tablet Take 1 tablet by mouth daily, Starting Tue 5/10/2022, Normal      omeprazole (PriLOSEC) 20 mg delayed release capsule Take 1 capsule (20 mg total) by mouth daily, Starting Mon 1/31/2022, Until Wed 3/2/2022, Normal      polyethylene glycol (GLYCOLAX) 17 GM/SCOOP powder Take 17 g by mouth daily, Starting Tue 2/1/2022, Until Thu 3/3/2022, Normal             No discharge procedures on file      PDMP Review     None          ED Provider  Electronically Signed by           Porsche Avila DO  05/18/22 6154

## 2022-05-20 LAB
ATRIAL RATE: 96 BPM
P AXIS: 49 DEGREES
PR INTERVAL: 154 MS
QRS AXIS: 60 DEGREES
QRSD INTERVAL: 82 MS
QT INTERVAL: 326 MS
QTC INTERVAL: 404 MS
T WAVE AXIS: 56 DEGREES
VENTRICULAR RATE: 92 BPM

## 2022-05-20 PROCEDURE — 93010 ELECTROCARDIOGRAM REPORT: CPT | Performed by: INTERNAL MEDICINE

## 2022-06-21 NOTE — PROGRESS NOTES
Assessment/Plan   Problem List Items Addressed This Visit    None     Visit Diagnoses     Routine gynecological examination    -  Primary    Relevant Orders    Liquid-based pap, screening          Discussion    All questions have been answered to her satisfaction  RTO for APE or sooner if needed    Subjective     HPI   Agnieszka Ross is a 32 y o  female who presents for annual well woman exam      LMP - 22; Periods are reg q 28 days and last 7 days; No excessive bleeding; No intermenstrual bleeding or spotting; Cramps are tolerable  No vulvar itch/burn; No vaginal itch/burn; No abn discharge or odor; No urinary sx - burning/pain/frequency/hematuria    (+) SBEs - no breast masses, asymmetry, nipple discharge or bleeding, changes in skin of breast, or breast tenderness bilaterally    No abd/pelvic pain or HAs;     Pt is sexually active in a mutually monog sexual relationship x 7 years; No issues with intercourse; She declines sti/hiv/hep testing; Feels safe at home    Current contraception: OCPs    (+) PCP for routine Bw/care; Last Pap - 19 WNL   History of abnormal Pap smear: denies   Last STI screen -     Review of Systems   Constitutional: Negative  Respiratory: Negative  Gastrointestinal: Negative  Endocrine: Negative  Genitourinary: Negative          The following portions of the patient's history were reviewed and updated as appropriate: allergies, current medications, past family history, past medical history, past social history, past surgical history and problem list          OB History        3    Para   2    Term   2            AB   1    Living   2       SAB        IAB        Ectopic        Multiple   0    Live Births   2           Obstetric Comments   : 2013  M; 2018  F; 1 VIP - surgical  Age of menarche 13  Age at first live birth 19             Past Medical History:   Diagnosis Date    Anxiety     last assessed -     Chest pain last assessed - [id number]    Continuous RUQ abdominal pain     last assessed - [id number]    External hemorrhoid     last assessed - [id number]    Finger injury     last assessed - [id number]    Varicella     childhood    Yeast vaginitis     last assessed - [id number]       Past Surgical History:   Procedure Laterality Date    COLONOSCOPY      DENTAL SURGERY      INDUCED       WISDOM TOOTH EXTRACTION         Family History   Problem Relation Age of Onset    No Known Problems Mother     No Known Problems Father     No Known Problems Sister     No Known Problems Maternal Grandmother     No Known Problems Maternal Grandfather     No Known Problems Paternal Grandmother     Liver cancer Paternal Grandfather     No Known Problems Daughter     No Known Problems Maternal Aunt     No Known Problems Maternal Aunt     No Known Problems Paternal Aunt     No Known Problems Paternal Aunt     No Known Problems Paternal Aunt     Colon cancer Cousin     Diabetes Other        Social History     Socioeconomic History    Marital status: Single     Spouse name: Not on file    Number of children: Not on file    Years of education: Not on file    Highest education level: Not on file   Occupational History    Not on file   Tobacco Use    Smoking status: Former Smoker     Types: Cigarettes     Quit date: 10/1/2019     Years since quittin 7    Smokeless tobacco: Never Used    Tobacco comment: 1 pack/week - working on quitting   Vaping Use    Vaping Use: Never used   Substance and Sexual Activity    Alcohol use:  Yes     Alcohol/week: 10 0 standard drinks     Types: 10 Standard drinks or equivalent per week     Comment: social     Drug use: No    Sexual activity: Yes     Partners: Male     Birth control/protection: Patch   Other Topics Concern    Not on file   Social History Narrative    Not on file     Social Determinants of Health     Financial Resource Strain: Not on file   Food Insecurity: Not on file   Transportation Needs: Not on file   Physical Activity: Not on file   Stress: Not on file   Social Connections: Not on file   Intimate Partner Violence: Not on file   Housing Stability: Not on file         Current Outpatient Medications:     famotidine (PEPCID) 20 mg tablet, Take 1 tablet (20 mg total) by mouth in the morning and 1 tablet (20 mg total) in the evening , Disp: 30 tablet, Rfl: 1    LORazepam (ATIVAN) 0 5 mg tablet, Take by mouth every 8 (eight) hours as needed for anxiety, Disp: , Rfl:     MULTIPLE VITAMIN PO, Take by mouth, Disp: , Rfl:     norethindrone-ethinyl estradiol (Junel FE 1/20) 1-20 MG-MCG per tablet, Take 1 tablet by mouth daily, Disp: 28 tablet, Rfl: 3    polyethylene glycol (GLYCOLAX) 17 GM/SCOOP powder, Take 17 g by mouth daily, Disp: 510 g, Rfl: 0    omeprazole (PriLOSEC) 20 mg delayed release capsule, Take 1 capsule (20 mg total) by mouth daily (Patient not taking: No sig reported), Disp: 30 capsule, Rfl: 0    No Known Allergies    Objective   Vitals:    06/22/22 1458   BP: 112/74   BP Location: Left arm   Patient Position: Sitting   Cuff Size: Standard   Weight: 67 kg (147 lb 9 6 oz)   Height: 5' 3" (1 6 m)     Physical Exam  Constitutional:       Appearance: She is well-developed  HENT:      Head: Normocephalic and atraumatic  Cardiovascular:      Rate and Rhythm: Normal rate and regular rhythm  Pulmonary:      Effort: Pulmonary effort is normal       Breath sounds: Normal breath sounds  Chest:   Breasts: Breasts are symmetrical       Right: No inverted nipple, mass, nipple discharge, skin change or tenderness  Left: No inverted nipple, mass, nipple discharge, skin change or tenderness  Abdominal:      General: There is no distension  Palpations: Abdomen is soft  There is no mass  Tenderness: There is no guarding or rebound  Genitourinary:     Exam position: Supine  Labia:         Right: No rash  Left: No rash         Vagina: No signs of injury and foreign body  No vaginal discharge or erythema  Cervix: No cervical motion tenderness  Adnexa:         Right: No mass  Left: No mass  Skin:     General: Skin is warm and dry  Neurological:      Mental Status: She is alert and oriented to person, place, and time  There are no Patient Instructions on file for this visit

## 2022-06-22 ENCOUNTER — ANNUAL EXAM (OUTPATIENT)
Dept: OBGYN CLINIC | Facility: CLINIC | Age: 27
End: 2022-06-22
Payer: COMMERCIAL

## 2022-06-22 VITALS
DIASTOLIC BLOOD PRESSURE: 74 MMHG | SYSTOLIC BLOOD PRESSURE: 112 MMHG | WEIGHT: 147.6 LBS | BODY MASS INDEX: 26.15 KG/M2 | HEIGHT: 63 IN

## 2022-06-22 DIAGNOSIS — Z01.419 ROUTINE GYNECOLOGICAL EXAMINATION: Primary | ICD-10-CM

## 2022-06-22 PROCEDURE — G0145 SCR C/V CYTO,THINLAYER,RESCR: HCPCS | Performed by: PHYSICIAN ASSISTANT

## 2022-06-22 PROCEDURE — 0503F POSTPARTUM CARE VISIT: CPT | Performed by: PHYSICIAN ASSISTANT

## 2022-06-22 PROCEDURE — 99395 PREV VISIT EST AGE 18-39: CPT | Performed by: PHYSICIAN ASSISTANT

## 2022-06-26 ENCOUNTER — HOSPITAL ENCOUNTER (EMERGENCY)
Facility: HOSPITAL | Age: 27
Discharge: HOME | End: 2022-06-26
Attending: EMERGENCY MEDICINE
Payer: COMMERCIAL

## 2022-06-26 VITALS
DIASTOLIC BLOOD PRESSURE: 74 MMHG | RESPIRATION RATE: 17 BRPM | TEMPERATURE: 98.4 F | SYSTOLIC BLOOD PRESSURE: 122 MMHG | HEART RATE: 88 BPM | OXYGEN SATURATION: 99 %

## 2022-06-26 DIAGNOSIS — F41.0 ANXIETY ATTACK: Primary | ICD-10-CM

## 2022-06-26 PROCEDURE — 93005 ELECTROCARDIOGRAM TRACING: CPT

## 2022-06-26 PROCEDURE — 99285 EMERGENCY DEPT VISIT HI MDM: CPT | Performed by: EMERGENCY MEDICINE

## 2022-06-26 PROCEDURE — 99284 EMERGENCY DEPT VISIT MOD MDM: CPT

## 2022-06-26 RX ORDER — LORAZEPAM 1 MG/1
1 TABLET ORAL ONCE
Status: COMPLETED | OUTPATIENT
Start: 2022-06-26 | End: 2022-06-26

## 2022-06-26 RX ORDER — LORAZEPAM 0.5 MG/1
0.5 TABLET ORAL EVERY 8 HOURS PRN
Qty: 8 TABLET | Refills: 0 | Status: SHIPPED | OUTPATIENT
Start: 2022-06-26 | End: 2022-07-06

## 2022-06-26 RX ADMIN — LORAZEPAM 1 MG: 1 TABLET ORAL at 11:45

## 2022-06-26 NOTE — DISCHARGE INSTRUCTIONS
Please make sure to follow-up with family doctor and also with the resources provided to you by crisis worker  Return to ER if any thoughts of hurting yourself, anybody else or need help

## 2022-06-26 NOTE — ED NOTES
This writer discussed the patients current presentation and recommended discharge plan with Dr Hamilton Res  He agrees with the patient being discharged at this time with referrals and/or information about  mobile crisis support  The patient was Instructed to follow up with pcp  The patient was provided with referral information for:   [facility] [phone], and [address] [place] [address] [phone]  The patient declined to complete a safety plan and declined a blank safety plan    In addition, the patient was instructed to call local [facility] crisis, other crisis services, 911 or to go to the nearest ER immediately if their situation changes at any time  This writer discussed discharge plans with the patient  who agrees with and understands the discharge plans

## 2022-06-26 NOTE — ED NOTES
Crisis met with pt to complete Crisis Intake and Safety Risk Assessment  Introduce self, role, and evaluation process  Pt present in ED with increased Anxiety  She stated she is feeling anxious because she found out her fiancee cheated on her, and trying to make sure of everything  She does not see a therapist or psychiatrist   She stated she regularity takes Ativan daily which is prescribed by her pcp who now refused to fill the prescription  She denies SI, HI, AVH or thoughts to self harm  She states she does not want to go in patient, but would like a referral for a new pcp and mobile crisis support line  Pt does not meet criteria for 201

## 2022-06-26 NOTE — ED PROVIDER NOTES
History  Chief Complaint   Patient presents with    Anxiety     States she just doesn't feel right, was not able to sleep last night; hx of anxiety; denies SI/HI       History provided by:  Patient   used: No    Anxiety  Associated symptoms: no abdominal pain, no chest pain and no headaches      Patient is a 49-year-old female presenting to emergency department due to feeling anxious  States yesterday she found out her  cheated on her, has been able to sleep  Feels anxious and nervous  No chest pain or shortness of breath  No nausea vomiting  Has heartburn  No abdominal pain  No SI or HI  No drug use  Social alcohol use  Used to take Ativan for anxiety but last time filled prescription was 2 years ago  Does not see therapist or psychiatrist     MDM dose of Ativan, crisis consult to help with finding resources for outpatient treatment          Prior to Admission Medications   Prescriptions Last Dose Informant Patient Reported? Taking? MULTIPLE VITAMIN PO 6/25/2022 at Unknown time  Yes Yes   Sig: Take by mouth   famotidine (PEPCID) 20 mg tablet Past Week at Unknown time  No Yes   Sig: Take 1 tablet (20 mg total) by mouth in the morning and 1 tablet (20 mg total) in the evening     norethindrone-ethinyl estradiol (Junel FE 1/20) 1-20 MG-MCG per tablet 6/26/2022 at Unknown time  No Yes   Sig: Take 1 tablet by mouth daily   omeprazole (PriLOSEC) 20 mg delayed release capsule   No No   Sig: Take 1 capsule (20 mg total) by mouth daily   Patient not taking: No sig reported   polyethylene glycol (GLYCOLAX) 17 GM/SCOOP powder   No No   Sig: Take 17 g by mouth daily      Facility-Administered Medications: None       Past Medical History:   Diagnosis Date    Anxiety     last assessed - [id number]    Chest pain     last assessed - [id number]    Continuous RUQ abdominal pain     last assessed - [id number]    External hemorrhoid     last assessed - [id number]    Finger injury     last assessed - [id number]    Varicella     childhood    Yeast vaginitis     last assessed - [id number]       Past Surgical History:   Procedure Laterality Date    COLONOSCOPY      DENTAL SURGERY      INDUCED       WISDOM TOOTH EXTRACTION         Family History   Problem Relation Age of Onset    No Known Problems Mother     No Known Problems Father     No Known Problems Sister     No Known Problems Maternal Grandmother     No Known Problems Maternal Grandfather     No Known Problems Paternal Grandmother     Liver cancer Paternal Grandfather     No Known Problems Daughter     No Known Problems Maternal Aunt     No Known Problems Maternal Aunt     No Known Problems Paternal Aunt     No Known Problems Paternal Aunt     No Known Problems Paternal Aunt     Colon cancer Cousin     Diabetes Other      I have reviewed and agree with the history as documented  E-Cigarette/Vaping    E-Cigarette Use Never User      E-Cigarette/Vaping Substances    Nicotine No     THC No     CBD No     Flavoring No     Other No     Unknown No      Social History     Tobacco Use    Smoking status: Current Every Day Smoker     Packs/day: 0 50     Types: Cigarettes     Last attempt to quit: 10/1/2019     Years since quittin 7    Smokeless tobacco: Never Used   Vaping Use    Vaping Use: Never used   Substance Use Topics    Alcohol use: Not Currently     Comment: social     Drug use: No       Review of Systems   Constitutional: Negative for chills, diaphoresis and fever  HENT: Negative for congestion and sore throat  Respiratory: Negative for cough, shortness of breath, wheezing and stridor  Cardiovascular: Negative for chest pain, palpitations and leg swelling  Gastrointestinal: Negative for abdominal pain, blood in stool, diarrhea, nausea and vomiting  Genitourinary: Negative for dysuria, frequency and urgency  Musculoskeletal: Negative for neck pain and neck stiffness     Skin: Negative for pallor and rash  Neurological: Negative for dizziness, syncope, weakness, light-headedness and headaches  All other systems reviewed and are negative  Physical Exam  Physical Exam  Vitals reviewed  Constitutional:       Appearance: Normal appearance  She is well-developed  HENT:      Head: Normocephalic and atraumatic  Eyes:      Extraocular Movements: Extraocular movements intact  Pupils: Pupils are equal, round, and reactive to light  Cardiovascular:      Rate and Rhythm: Normal rate and regular rhythm  Heart sounds: Normal heart sounds  Pulmonary:      Effort: Pulmonary effort is normal  No respiratory distress  Breath sounds: Normal breath sounds  Abdominal:      General: Bowel sounds are normal       Palpations: Abdomen is soft  Tenderness: There is no abdominal tenderness  Musculoskeletal:         General: No swelling or tenderness  Normal range of motion  Cervical back: Normal range of motion and neck supple  Skin:     General: Skin is warm and dry  Capillary Refill: Capillary refill takes less than 2 seconds  Neurological:      General: No focal deficit present  Mental Status: She is alert and oriented to person, place, and time           Vital Signs  ED Triage Vitals   Temperature Pulse Respirations Blood Pressure SpO2   06/26/22 1040 06/26/22 1040 06/26/22 1040 06/26/22 1040 06/26/22 1040   98 4 °F (36 9 °C) 100 18 124/75 97 %      Temp Source Heart Rate Source Patient Position - Orthostatic VS BP Location FiO2 (%)   06/26/22 1040 06/26/22 1040 06/26/22 1040 06/26/22 1040 --   Oral Monitor Sitting Left arm       Pain Score       06/26/22 1330       No Pain           Vitals:    06/26/22 1040 06/26/22 1330   BP: 124/75 122/74   Pulse: 100 88   Patient Position - Orthostatic VS: Sitting          Visual Acuity      ED Medications  Medications   LORazepam (ATIVAN) tablet 1 mg (1 mg Oral Given 6/26/22 1145)       Diagnostic Studies  Results Reviewed None                 No orders to display              Procedures  Procedures         ED Course  ED Course as of 06/26/22 1817   [place] Jun 26, 2022   1148 ECG shows rate of 99, sinus, normal axis, normal QRS, no significant ST or T-wave changes, normal intervals, independently interpreted by me   1335 Patient is sleeping  Feeling better  Will discharge  Will return if feeling worse  Outpatient follow-up given  SBIRT 20yo+    Flowsheet Row Most Recent Value   SBIRT (23 yo +)    In order to provide better care to our patients, we are screening all of our patients for alcohol and drug use  Would it be okay to ask you these screening questions? No Filed at: 06/26/2022 1149                    MDM     Patient feeling better  Sleeping  Will follow-up as outpatient  Aware to come back if feeling worse  Does not want to stay in the hospital     Disposition  Final diagnoses:   Anxiety attack     Time reflects when diagnosis was documented in both MDM as applicable and the Disposition within this note     Time User Action Codes Description Comment    6/26/2022  1:30 PM Pennelope Kayser Add [F41 0] Anxiety attack       ED Disposition     ED Disposition   Discharge    Condition   Stable    Date/Time   Sun Jun 26, 2022  1:30 PM    Comment   Sheeba Due discharge to home/self care                 Follow-up Information     Follow up With Specialties Details Why Contact Info Additional Information    [facility] Emergency Medicine  As needed, If symptoms worsen [address] [facility] [id number] [place] [address] Emergency Department, [address], [place], [place], [address] [facility]  Family Medicine Schedule an appointment as soon as possible for a visit  Family doctor [address] [id number]  [address] , [place], [place], [place], [address]           Discharge Medication List as of 6/26/2022  1:34 PM      START taking these medications    Details   LORazepam (Ativan) 0 5 mg tablet Take 1 tablet (0 5 mg total) by mouth every 8 (eight) hours as needed for anxiety for up to 10 days, Starting Sun 6/26/2022, Until Wed 7/6/2022 at 2359, Normal         CONTINUE these medications which have NOT CHANGED    Details   famotidine (PEPCID) 20 mg tablet Take 1 tablet (20 mg total) by mouth in the morning and 1 tablet (20 mg total) in the evening , Starting Mon 5/16/2022, Normal      MULTIPLE VITAMIN PO Take by mouth, Historical Med      norethindrone-ethinyl estradiol (Junel FE 1/20) 1-20 MG-MCG per tablet Take 1 tablet by mouth daily, Starting Tue 5/10/2022, Normal      omeprazole (PriLOSEC) 20 mg delayed release capsule Take 1 capsule (20 mg total) by mouth daily, Starting Mon 1/31/2022, Until Wed 3/2/2022, Normal      polyethylene glycol (GLYCOLAX) 17 GM/SCOOP powder Take 17 g by mouth daily, Starting Tue 2/1/2022, Until Wed 6/22/2022, Normal             No discharge procedures on file      PDMP Review     None          ED Provider  Electronically Signed by           Andrew Yeh MD  06/26/22 [phone]

## 2022-06-27 LAB
ATRIAL RATE: 99 BPM
LAB AP GYN PRIMARY INTERPRETATION: NORMAL
Lab: NORMAL
P AXIS: 80 DEGREES
PR INTERVAL: 152 MS
QRS AXIS: 82 DEGREES
QRSD INTERVAL: 78 MS
QT INTERVAL: 318 MS
QTC INTERVAL: 408 MS
T WAVE AXIS: 78 DEGREES
VENTRICULAR RATE: 99 BPM

## 2022-06-27 PROCEDURE — 93010 ELECTROCARDIOGRAM REPORT: CPT | Performed by: INTERNAL MEDICINE

## 2022-07-11 DIAGNOSIS — R10.11 RIGHT UPPER QUADRANT ABDOMINAL PAIN: ICD-10-CM

## 2022-07-11 RX ORDER — FAMOTIDINE 20 MG/1
20 TABLET, FILM COATED ORAL 2 TIMES DAILY
Qty: 180 TABLET | Refills: 1 | Status: SHIPPED | OUTPATIENT
Start: 2022-07-11

## 2022-10-06 ENCOUNTER — TELEPHONE (OUTPATIENT)
Dept: PSYCHIATRY | Facility: CLINIC | Age: 27
End: 2022-10-06

## 2022-12-16 NOTE — ANESTHESIA PREPROCEDURE EVALUATION
at 40+3 in labor, requesting analgesia    Review of Systems/Medical History  Patient summary reviewed  Chart reviewed  No history of anesthetic complications (prior epidural, no problems)     Cardiovascular  Negative cardio ROS    Pulmonary  Negative pulmonary ROS        GI/Hepatic  Negative GI/hepatic ROS          Negative  ROS        Endo/Other  Negative endo/other ROS      GYN  Currently pregnant , Prior pregnancy/OB history : 3 Parity: 1,          Hematology  Negative hematology ROS      Musculoskeletal  Negative musculoskeletal ROS        Neurology  Negative neurology ROS      Psychology   Negative psychology ROS              Physical Exam    Airway    Mallampati score: III         Dental   No notable dental hx     Cardiovascular  Comment: Negative ROS,     Pulmonary      Other Findings      Lab Results   Component Value Date    HGB 12 3 02/10/2018     (L) 02/10/2018       Anesthesia Plan  ASA Score- 2     Anesthesia Type- epidural and spinal with ASA Monitors  Additional Monitors:   Airway Plan:         Plan Factors-    Induction-     Postoperative Plan-     Informed Consent- Anesthetic plan and risks discussed with patient and spouse  Last Visit Date: 10/6/2022   Next Visit Date: Visit date not found

## 2023-02-01 ENCOUNTER — OFFICE VISIT (OUTPATIENT)
Dept: URGENT CARE | Age: 28
End: 2023-02-01

## 2023-02-01 VITALS
RESPIRATION RATE: 12 BRPM | OXYGEN SATURATION: 97 % | DIASTOLIC BLOOD PRESSURE: 58 MMHG | SYSTOLIC BLOOD PRESSURE: 102 MMHG | HEART RATE: 84 BPM | TEMPERATURE: 98.2 F

## 2023-02-01 DIAGNOSIS — J02.9 SORE THROAT: Primary | ICD-10-CM

## 2023-02-01 LAB — S PYO AG THROAT QL: POSITIVE

## 2023-02-01 RX ORDER — AMOXICILLIN AND CLAVULANATE POTASSIUM 875; 125 MG/1; MG/1
1 TABLET, FILM COATED ORAL EVERY 12 HOURS SCHEDULED
Qty: 14 TABLET | Refills: 0 | Status: SHIPPED | OUTPATIENT
Start: 2023-02-01 | End: 2023-02-08

## 2023-02-02 ENCOUNTER — OFFICE VISIT (OUTPATIENT)
Dept: URGENT CARE | Age: 28
End: 2023-02-02

## 2023-02-02 ENCOUNTER — HOSPITAL ENCOUNTER (EMERGENCY)
Facility: HOSPITAL | Age: 28
Discharge: HOME | End: 2023-02-02
Attending: EMERGENCY MEDICINE

## 2023-02-02 VITALS
OXYGEN SATURATION: 100 % | SYSTOLIC BLOOD PRESSURE: 104 MMHG | TEMPERATURE: 98.3 F | HEART RATE: 69 BPM | RESPIRATION RATE: 18 BRPM | DIASTOLIC BLOOD PRESSURE: 57 MMHG

## 2023-02-02 DIAGNOSIS — I49.3 SYMPTOMATIC PVCS: Primary | ICD-10-CM

## 2023-02-02 DIAGNOSIS — I49.9 IRREGULAR HEARTBEAT: Primary | ICD-10-CM

## 2023-02-02 LAB
ALBUMIN SERPL BCP-MCNC: 4.3 G/DL (ref 3.5–5)
ALP SERPL-CCNC: 55 U/L (ref 34–104)
ALT SERPL W P-5'-P-CCNC: 17 U/L (ref 7–52)
ANION GAP SERPL CALCULATED.3IONS-SCNC: 6 MMOL/L (ref 4–13)
AST SERPL W P-5'-P-CCNC: 13 U/L (ref 13–39)
BASOPHILS # BLD AUTO: 0.04 THOUSANDS/ÂΜL (ref 0–0.1)
BASOPHILS NFR BLD AUTO: 1 % (ref 0–1)
BILIRUB SERPL-MCNC: 0.49 MG/DL (ref 0.2–1)
BUN SERPL-MCNC: 15 MG/DL (ref 5–25)
CALCIUM SERPL-MCNC: 9 MG/DL (ref 8.4–10.2)
CARDIAC TROPONIN I PNL SERPL HS: <2 NG/L
CHLORIDE SERPL-SCNC: 107 MMOL/L (ref 96–108)
CO2 SERPL-SCNC: 26 MMOL/L (ref 21–32)
CREAT SERPL-MCNC: 0.63 MG/DL (ref 0.6–1.3)
EOSINOPHIL # BLD AUTO: 0.19 THOUSAND/ÂΜL (ref 0–0.61)
EOSINOPHIL NFR BLD AUTO: 3 % (ref 0–6)
ERYTHROCYTE [DISTWIDTH] IN BLOOD BY AUTOMATED COUNT: 12.3 % (ref 11.6–15.1)
EXT PREGNANCY TEST URINE: NEGATIVE
EXT. CONTROL: NORMAL
GFR SERPL CREATININE-BSD FRML MDRD: 121 ML/MIN/1.73SQ M
GLUCOSE SERPL-MCNC: 80 MG/DL (ref 65–140)
HCT VFR BLD AUTO: 39.8 % (ref 34.8–46.1)
HGB BLD-MCNC: 13.2 G/DL (ref 11.5–15.4)
IMM GRANULOCYTES # BLD AUTO: 0.03 THOUSAND/UL (ref 0–0.2)
IMM GRANULOCYTES NFR BLD AUTO: 0 % (ref 0–2)
LYMPHOCYTES # BLD AUTO: 2.32 THOUSANDS/ÂΜL (ref 0.6–4.47)
LYMPHOCYTES NFR BLD AUTO: 31 % (ref 14–44)
MAGNESIUM SERPL-MCNC: 1.9 MG/DL (ref 1.9–2.7)
MCH RBC QN AUTO: 31.7 PG (ref 26.8–34.3)
MCHC RBC AUTO-ENTMCNC: 33.2 G/DL (ref 31.4–37.4)
MCV RBC AUTO: 96 FL (ref 82–98)
MONOCYTES # BLD AUTO: 0.61 THOUSAND/ÂΜL (ref 0.17–1.22)
MONOCYTES NFR BLD AUTO: 8 % (ref 4–12)
NEUTROPHILS # BLD AUTO: 4.31 THOUSANDS/ÂΜL (ref 1.85–7.62)
NEUTS SEG NFR BLD AUTO: 57 % (ref 43–75)
NRBC BLD AUTO-RTO: 0 /100 WBCS
PLATELET # BLD AUTO: 197 THOUSANDS/UL (ref 149–390)
PMV BLD AUTO: 10.7 FL (ref 8.9–12.7)
POTASSIUM SERPL-SCNC: 3.6 MMOL/L (ref 3.5–5.3)
PROT SERPL-MCNC: 7.2 G/DL (ref 6.4–8.4)
RBC # BLD AUTO: 4.16 MILLION/UL (ref 3.81–5.12)
SODIUM SERPL-SCNC: 139 MMOL/L (ref 135–147)
TSH SERPL DL<=0.05 MIU/L-ACNC: 2.49 UIU/ML (ref 0.45–4.5)
WBC # BLD AUTO: 7.5 THOUSAND/UL (ref 4.31–10.16)

## 2023-02-02 NOTE — PROGRESS NOTES
[facility]        NAME: Heriberto Aldana is a 32 y o  female  : 1995    MRN: [id number]  DATE: 2023  TIME: 5:46 PM    Assessment and Plan   Irregular heartbeat [I49 9]  1  Irregular heartbeat  Transfer to other facility            Patient Instructions       Follow up with PCP in 3-5 days  Proceed to  ER if symptoms worsen  Chief Complaint   No chief complaint on file  History of Present Illness       Patient is here for evaluation of the sensation of an extra beat which started on Monday  Patient states she is getting approximately 30 times a day where she feels that sensation  Review of Systems   Review of Systems   Constitutional: Negative  Respiratory: Negative  Cardiovascular: Negative for chest pain and leg swelling  Sensation of an extra beat   Gastrointestinal: Negative  Endocrine: Negative  Skin: Negative  Neurological: Negative  Hematological: Negative  Psychiatric/Behavioral: Negative            Current Medications       Current Outpatient Medications:   •  amoxicillin-clavulanate (AUGMENTIN) 875-125 mg per tablet, Take 1 tablet by mouth every 12 (twelve) hours for 7 days, Disp: 14 tablet, Rfl: 0  •  famotidine (PEPCID) 20 mg tablet, TAKE 1 TABLET (20 MG TOTAL) BY MOUTH IN THE MORNING AND 1 TABLET (20 MG TOTAL) IN THE EVENING , Disp: 180 tablet, Rfl: 1  •  Junel FE 1/20 1-20 MG-MCG per tablet, TAKE 1 TABLET BY MOUTH EVERY DAY, Disp: 84 tablet, Rfl: 3  •  LORazepam (Ativan) 0 5 mg tablet, Take 1 tablet (0 5 mg total) by mouth every 8 (eight) hours as needed for anxiety for up to 10 days, Disp: 8 tablet, Rfl: 0  •  MULTIPLE VITAMIN PO, Take by mouth, Disp: , Rfl:   •  omeprazole (PriLOSEC) 20 mg delayed release capsule, Take 1 capsule (20 mg total) by mouth daily (Patient not taking: No sig reported), Disp: 30 capsule, Rfl: 0  •  polyethylene glycol (GLYCOLAX) 17 GM/SCOOP powder, Take 17 g by mouth daily, Disp: 510 g, Rfl: 0    Current Allergies     Allergies as of 2023   • (No Known Allergies)            The following portions of the patient's history were reviewed and updated as appropriate: allergies, current medications, past family history, past medical history, past social history, past surgical history and problem list      Past Medical History:   Diagnosis Date   • Anxiety     last assessed - [id number]   • Chest pain     last assessed - [id number]   • Continuous RUQ abdominal pain     last assessed - [id number]   • External hemorrhoid     last assessed - [id number]   • Finger injury     last assessed - [id number]   • Varicella     childhood   • Yeast vaginitis     last assessed - [id number]       Past Surgical History:   Procedure Laterality Date   • COLONOSCOPY     • DENTAL SURGERY     • INDUCED      • WISDOM TOOTH EXTRACTION         Family History   Problem Relation Age of Onset   • No Known Problems Mother    • No Known Problems Father    • No Known Problems Sister    • No Known Problems Maternal Grandmother    • No Known Problems Maternal Grandfather    • No Known Problems Paternal Grandmother    • Liver cancer Paternal Grandfather    • No Known Problems Daughter    • No Known Problems Maternal Aunt    • No Known Problems Maternal Aunt    • No Known Problems Paternal Aunt    • No Known Problems Paternal Aunt    • No Known Problems Paternal Aunt    • Colon cancer Cousin    • Diabetes Other          Medications have been verified  Objective   There were no vitals taken for this visit  No LMP recorded  Physical Exam     Physical Exam  Vitals and nursing note reviewed  Constitutional:       General: She is not in acute distress  Appearance: Normal appearance  She is well-developed  She is not ill-appearing, toxic-appearing or diaphoretic  HENT:      Head: Normocephalic and atraumatic  Eyes:      Extraocular Movements: Extraocular movements intact        Conjunctiva/sclera: Conjunctivae normal  Pupils: Pupils are equal, round, and reactive to light  Cardiovascular:      Rate and Rhythm: Normal rate  Rhythm irregular  Pulses: Normal pulses  Heart sounds: No murmur heard  Comments: 3 extra beats noted intermittently during auscultation  Pulmonary:      Effort: Pulmonary effort is normal  No respiratory distress  Breath sounds: Normal breath sounds  No stridor  No wheezing, rhonchi or rales  Musculoskeletal:      Right lower leg: No edema  Left lower leg: No edema  Skin:     General: Skin is warm and dry  Findings: No rash  Neurological:      General: No focal deficit present  Mental Status: She is alert and oriented to person, place, and time  Psychiatric:         Mood and Affect: Mood normal          Behavior: Behavior normal          Thought Content:  Thought content normal          Judgment: Judgment normal        EKG - NSR; extrasystole noted on exam

## 2023-02-02 NOTE — PROGRESS NOTES
[facility]        NAME: Russ Shay is a 32 y o  female  : 1995    MRN: [id number]  DATE: 2023  TIME: 7:40 PM    Assessment and Plan   Sore throat [J02 9]  1  Sore throat  POCT rapid strepA    amoxicillin-clavulanate (AUGMENTIN) 875-125 mg per tablet            Patient Instructions       Follow up with PCP in 3-5 days  Proceed to  ER if symptoms worsen  Chief Complaint     Chief Complaint   Patient presents with   • Sore Throat     Started yesterday; daughter strep positive   • Cough         History of Present Illness       Sore Throat   Associated symptoms include coughing and shortness of breath  Cough  Associated symptoms include a sore throat, shortness of breath and wheezing  Pertinent negatives include no chest pain or rhinorrhea  Shortness of Breath  The current episode started in the past 7 days  The problem occurs every few minutes  The problem is unchanged  Associated symptoms include coughing, a sore throat and wheezing  Pertinent negatives include no chest pain, leg swelling, orthopnea or rhinorrhea  Nothing aggravates the symptoms  Review of Systems   Review of Systems   HENT: Positive for sore throat  Negative for rhinorrhea  Respiratory: Positive for cough, shortness of breath and wheezing  Cardiovascular: Negative for chest pain, orthopnea and leg swelling           Current Medications       Current Outpatient Medications:   •  amoxicillin-clavulanate (AUGMENTIN) 875-125 mg per tablet, Take 1 tablet by mouth every 12 (twelve) hours for 7 days, Disp: 14 tablet, Rfl: 0  •  famotidine (PEPCID) 20 mg tablet, TAKE 1 TABLET (20 MG TOTAL) BY MOUTH IN THE MORNING AND 1 TABLET (20 MG TOTAL) IN THE EVENING , Disp: 180 tablet, Rfl: 1  •  Junel FE 1/20 1-20 MG-MCG per tablet, TAKE 1 TABLET BY MOUTH EVERY DAY, Disp: 84 tablet, Rfl: 3  •  LORazepam (Ativan) 0 5 mg tablet, Take 1 tablet (0 5 mg total) by mouth every 8 (eight) hours as needed for anxiety for up to 10 days, Disp: 8 tablet, Rfl: 0  •  MULTIPLE VITAMIN PO, Take by mouth, Disp: , Rfl:   •  omeprazole (PriLOSEC) 20 mg delayed release capsule, Take 1 capsule (20 mg total) by mouth daily (Patient not taking: No sig reported), Disp: 30 capsule, Rfl: 0  •  polyethylene glycol (GLYCOLAX) 17 GM/SCOOP powder, Take 17 g by mouth daily, Disp: 510 g, Rfl: 0    Current Allergies     Allergies as of 2023   • (No Known Allergies)            The following portions of the patient's history were reviewed and updated as appropriate: allergies, current medications, past family history, past medical history, past social history, past surgical history and problem list      Past Medical History:   Diagnosis Date   • Anxiety     last assessed - [id number]   • Chest pain     last assessed - [id number]   • Continuous RUQ abdominal pain     last assessed - [id number]   • External hemorrhoid     last assessed - [id number]   • Finger injury     last assessed - [id number]   • Varicella     childhood   • Yeast vaginitis     last assessed - [id number]       Past Surgical History:   Procedure Laterality Date   • COLONOSCOPY     • DENTAL SURGERY     • INDUCED      • WISDOM TOOTH EXTRACTION         Family History   Problem Relation Age of Onset   • No Known Problems Mother    • No Known Problems Father    • No Known Problems Sister    • No Known Problems Maternal Grandmother    • No Known Problems Maternal Grandfather    • No Known Problems Paternal Grandmother    • Liver cancer Paternal Grandfather    • No Known Problems Daughter    • No Known Problems Maternal Aunt    • No Known Problems Maternal Aunt    • No Known Problems Paternal Aunt    • No Known Problems Paternal Aunt    • No Known Problems Paternal Aunt    • Colon cancer Cousin    • Diabetes Other          Medications have been verified          Objective   /58 (BP Location: Left arm, Patient Position: Sitting, Cuff Size: Large)   Pulse 84   Temp 98 2 °F (36 8 °C) (Temporal)   Resp 12   SpO2 97%   No LMP recorded  Physical Exam     Physical Exam  Constitutional:       Appearance: She is well-developed  HENT:      Head: Normocephalic and atraumatic  Nose: Congestion present  Mouth/Throat:      Pharynx: Posterior oropharyngeal erythema present  Tonsils: 1+ on the right  1+ on the left  Eyes:      General:         Right eye: No discharge  Left eye: No discharge  Conjunctiva/sclera: Conjunctivae normal    Pulmonary:      Effort: Pulmonary effort is normal  No respiratory distress  Abdominal:      Palpations: Abdomen is soft  Tenderness: There is no abdominal tenderness  Musculoskeletal:      Cervical back: Normal range of motion  Skin:     Capillary Refill: Capillary refill takes less than 2 seconds  Findings: No erythema  Neurological:      Mental Status: She is alert and oriented to person, place, and time  Answers for HPI/ROS submitted by the patient on 2/1/2023  Episode duration: 1 Seconds  claudication: No  coryza: No  hemoptysis: No  leg pain: No  PND: No  sputum production: Yes  swollen glands: Yes  syncope:  No

## 2023-02-02 NOTE — ED NOTES
Pt asking to use bathroom/ pt sent with urine cup  No distress noted       Aleena Varela RN  02/02/23 5870

## 2023-02-03 LAB
ATRIAL RATE: 77 BPM
ATRIAL RATE: 84 BPM
P AXIS: 61 DEGREES
P AXIS: 63 DEGREES
PR INTERVAL: 152 MS
PR INTERVAL: 156 MS
QRS AXIS: 67 DEGREES
QRS AXIS: 82 DEGREES
QRSD INTERVAL: 78 MS
QRSD INTERVAL: 78 MS
QT INTERVAL: 358 MS
QT INTERVAL: 376 MS
QTC INTERVAL: 423 MS
QTC INTERVAL: 425 MS
T WAVE AXIS: 46 DEGREES
T WAVE AXIS: 51 DEGREES
VENTRICULAR RATE: 77 BPM
VENTRICULAR RATE: 84 BPM

## 2023-02-03 NOTE — ED PROVIDER NOTES
History  Chief Complaint   Patient presents with   • Palpitations     Patient arrives to the ER from home with complaints of palpitations  Pt had an ekg done at urgent care and it was normal  Per pt urgent care provider could hear 'extra heart beats' upon physical examination  -fever/chills  + cough x a few weeks  • Cough       History provided by:  Patient   used: No    49-year-old female presented for evaluation of intermittent palpitations feeling like an extra beat over the last day or so  States she has no chest pain, shortness of breath, lightheadedness, dizziness, nausea, vomiting, diaphoresis, abdominal pain  Denies any history of similar symptoms  She is currently getting treated with Augmentin for streptococcal pharyngitis  Denies any change in diet  She does not use caffeine at all  She said a few episodes since arriving to the emergency department  Had one during my evaluation which was consistent with a single PVC on the monitor  Heart sounds were notable for intermittent gallop  No murmurs  EKG unremarkable  Plan labs, reevaluate  Prior to Admission Medications   Prescriptions Last Dose Informant Patient Reported? Taking? Junel FE 1/20 1-20 MG-MCG per tablet   No No   Sig: TAKE 1 TABLET BY MOUTH EVERY DAY   LORazepam (Ativan) 0 5 mg tablet   No No   Sig: Take 1 tablet (0 5 mg total) by mouth every 8 (eight) hours as needed for anxiety for up to 10 days   MULTIPLE VITAMIN PO   Yes No   Sig: Take by mouth   amoxicillin-clavulanate (AUGMENTIN) 875-125 mg per tablet   No No   Sig: Take 1 tablet by mouth every 12 (twelve) hours for 7 days   famotidine (PEPCID) 20 mg tablet   No No   Sig: TAKE 1 TABLET (20 MG TOTAL) BY MOUTH IN THE MORNING AND 1 TABLET (20 MG TOTAL) IN THE EVENING     omeprazole (PriLOSEC) 20 mg delayed release capsule   No No   Sig: Take 1 capsule (20 mg total) by mouth daily   Patient not taking: No sig reported   polyethylene glycol (GLYCOLAX) 17 GM/SCOOP powder   No No   Sig: Take 17 g by mouth daily      Facility-Administered Medications: None       Past Medical History:   Diagnosis Date   • Anxiety     last assessed - [id number]   • Chest pain     last assessed - [id number]   • Continuous RUQ abdominal pain     last assessed - [id number]   • External hemorrhoid     last assessed - [id number]   • Finger injury     last assessed - 95Skq8661   • Varicella     childhood   • Yeast vaginitis     last assessed - 87Shu2733       Past Surgical History:   Procedure Laterality Date   • COLONOSCOPY     • DENTAL SURGERY     • INDUCED      • WISDOM TOOTH EXTRACTION         Family History   Problem Relation Age of Onset   • No Known Problems Mother    • No Known Problems Father    • No Known Problems Sister    • No Known Problems Maternal Grandmother    • No Known Problems Maternal Grandfather    • No Known Problems Paternal Grandmother    • Liver cancer Paternal Grandfather    • No Known Problems Daughter    • No Known Problems Maternal Aunt    • No Known Problems Maternal Aunt    • No Known Problems Paternal Aunt    • No Known Problems Paternal Aunt    • No Known Problems Paternal Aunt    • Colon cancer Cousin    • Diabetes Other      I have reviewed and agree with the history as documented  E-Cigarette/Vaping   • E-Cigarette Use Never User      E-Cigarette/Vaping Substances   • Nicotine No    • THC No    • CBD No    • Flavoring No    • Other No    • Unknown No      Social History     Tobacco Use   • Smoking status: Every Day     Packs/day: 0 50     Types: Cigarettes     Last attempt to quit: 10/1/2019     Years since quitting: 3 3   • Smokeless tobacco: Never   Vaping Use   • Vaping Use: Never used   Substance Use Topics   • Alcohol use: Not Currently     Comment: social    • Drug use: No       Review of Systems   Constitutional: Negative for activity change, appetite change, diaphoresis, fatigue and fever  HENT: Positive for sore throat      Respiratory: Negative for cough, chest tightness and shortness of breath  Cardiovascular: Positive for palpitations  Negative for chest pain  Gastrointestinal: Negative for abdominal pain, nausea and vomiting  Musculoskeletal: Negative for back pain and neck pain  Neurological: Negative for dizziness, weakness, light-headedness and headaches  All other systems reviewed and are negative  Physical Exam  Physical Exam  Vitals and nursing note reviewed  Constitutional:       Appearance: Normal appearance  HENT:      Head: Normocephalic and atraumatic  Cardiovascular:      Rate and Rhythm: Normal rate and regular rhythm  Heart sounds: No murmur heard  Gallop present  Pulmonary:      Effort: Pulmonary effort is normal       Breath sounds: Normal breath sounds  Abdominal:      General: There is no distension  Palpations: Abdomen is soft  Musculoskeletal:         General: No swelling  Normal range of motion  Cervical back: Normal range of motion and neck supple  Skin:     General: Skin is warm and dry  Neurological:      General: No focal deficit present  Mental Status: She is alert and oriented to person, place, and time     Psychiatric:         Mood and Affect: Mood normal          Behavior: Behavior normal          Vital Signs  ED Triage Vitals [02/02/23 1827]   Temperature Pulse Respirations Blood Pressure SpO2   98 3 °F (36 8 °C) 82 18 116/73 98 %      Temp src Heart Rate Source Patient Position - Orthostatic VS BP Location FiO2 (%)   -- -- -- -- --      Pain Score       --           Vitals:    02/02/23 1827   BP: 116/73   Pulse: 82         Visual Acuity      ED Medications  Medications - No data to display    Diagnostic Studies  Results Reviewed     Procedure Component Value Units Date/Time    HS Troponin 0hr (reflex protocol) [[id number]]  (Normal) Collected: 02/02/23 1847    Lab Status: Final result Specimen: Blood from Arm, Right Updated: 02/02/23 1928     hs TnI 0hr <2 ng/L POCT pregnancy, urine [[id number]]  (Normal) Resulted: 02/02/23 1921    Lab Status: Final result Updated: 02/02/23 1922     EXT Preg Test, Ur Negative     Control Valid    Comprehensive metabolic panel [[id number]] Collected: 02/02/23 1847    Lab Status: Final result Specimen: Blood from Arm, Right Updated: 02/02/23 1920     Sodium 139 mmol/L      Potassium 3 6 mmol/L      Chloride 107 mmol/L      CO2 26 mmol/L      ANION GAP 6 mmol/L      BUN 15 mg/dL      Creatinine 0 63 mg/dL      Glucose 80 mg/dL      Calcium 9 0 mg/dL      AST 13 U/L      ALT 17 U/L      Alkaline Phosphatase 55 U/L      Total Protein 7 2 g/dL      Albumin 4 3 g/dL      Total Bilirubin 0 49 mg/dL      eGFR 121 ml/min/1 73sq m     Narrative:      National Kidney Disease Foundation guidelines for Chronic Kidney Disease (CKD):   •  Stage 1 with normal or high GFR (GFR > 90 mL/min/1 73 square meters)  •  Stage 2 Mild CKD (GFR = 60-89 mL/min/1 73 square meters)  •  Stage 3A Moderate CKD (GFR = 45-59 mL/min/1 73 square meters)  •  Stage 3B Moderate CKD (GFR = 30-44 mL/min/1 73 square meters)  •  Stage 4 Severe CKD (GFR = 15-29 mL/min/1 73 square meters)  •  Stage 5 End Stage CKD (GFR <15 mL/min/1 73 square meters)  Note: GFR calculation is accurate only with a steady state creatinine    TSH [[id number]]     Lab Status: No result Specimen: Blood     Magnesium [[id number]]     Lab Status: No result Specimen: Blood     CBC and differential [[id number]] Collected: 02/02/23 1847    Lab Status: Final result Specimen: Blood from Arm, Right Updated: 02/02/23 1855     WBC 7 50 Thousand/uL      RBC 4 16 Million/uL      Hemoglobin 13 2 g/dL      Hematocrit 39 8 %      MCV 96 fL      MCH 31 7 pg      MCHC 33 2 g/dL      RDW 12 3 %      MPV 10 7 fL      Platelets 790 Thousands/uL      nRBC 0 /100 WBCs      Neutrophils Relative 57 %      Immat GRANS % 0 %      Lymphocytes Relative 31 %      Monocytes Relative 8 %      Eosinophils Relative 3 %      Basophils Relative 1 %      Neutrophils Absolute 4 31 Thousands/µL      Immature Grans Absolute 0 03 Thousand/uL      Lymphocytes Absolute 2 32 Thousands/µL      Monocytes Absolute 0 61 Thousand/µL      Eosinophils Absolute 0 19 Thousand/µL      Basophils Absolute 0 04 Thousands/µL     [place] draw [[id number]] Collected: 02/02/23 1847    Lab Status: In process Specimen: Blood from Arm, Right Updated: 02/02/23 1850    Narrative: The following orders were created for panel order [place] draw  Procedure                               Abnormality         Status                     ---------                               -----------         ------                     Verta Ham Top on TQYT[[id number]]                           In process                   Please view results for these tests on the individual orders  No orders to display              Procedures  ECG 12 Lead Documentation Only    Date/Time: 2/2/2023 7:14 PM  Performed by: Dada Garcia MD  Authorized by: Dada Garcia MD     Indications / Diagnosis:  Palpitations  ECG reviewed by me, the ED Provider: yes    Patient location:  ED  Previous ECG:     Previous ECG:  Unavailable  Rate:     ECG rate:  84  Rhythm:     Rhythm: sinus rhythm    Ectopy:     Ectopy: none    QRS:     QRS axis:  Normal  Conduction:     Conduction: normal    ST segments:     ST segments:  Normal  T waves:     T waves: normal               ED Course                               SBIRT 20yo+    Flowsheet Row Most Recent Value   SBIRT (25 yo +)    In order to provide better care to our patients, we are screening all of our patients for alcohol and drug use  Would it be okay to ask you these screening questions?  Unable to answer at this time Filed at: 02/02/2023 9103                    MDM    Disposition  Final diagnoses:   None     ED Disposition     None      Follow-up Information    None         Patient's Medications   Discharge Prescriptions    No medications on file       No discharge procedures on file      PDMP Review     None          ED Provider  Electronically Signed by Comment   Yulisa Mast discharge to home/self care                 Follow-up Information     Follow up With Specialties Details Why Contact Info Additional Information    [facility]   [address]  [address] [phone] [address] Card[facility] [address] [place], [place], [address]    [address] Emergency Department Emergency Medicine  If symptoms worsen [address] [facility] [address] [facility] Emergency Department, [address], [place], [place], [address]          Discharge Medication List as of 2/2/2023 11:07 PM      CONTINUE these medications which have NOT CHANGED    Details   amoxicillin-clavulanate (AUGMENTIN) 875-125 mg per tablet Take 1 tablet by mouth every 12 (twelve) hours for 7 days, Starting Wed 2/1/2023, Until Wed 2/8/2023, Normal      famotidine (PEPCID) 20 mg tablet TAKE 1 TABLET (20 MG TOTAL) BY MOUTH IN THE MORNING AND 1 TABLET (20 MG TOTAL) IN THE EVENING , Starting Mon 7/11/2022, Normal      Junel FE 1/20 1-20 MG-MCG per tablet TAKE 1 TABLET BY MOUTH EVERY DAY, Normal      LORazepam (Ativan) 0 5 mg tablet Take 1 tablet (0 5 mg total) by mouth every 8 (eight) hours as needed for anxiety for up to 10 days, Starting Sun 6/26/2022, Until Wed 7/6/2022 at 2359, Normal      MULTIPLE VITAMIN PO Take by mouth, Historical Med      omeprazole (PriLOSEC) 20 mg delayed release capsule Take 1 capsule (20 mg total) by mouth daily, Starting Mon 1/31/2022, Until Wed 3/2/2022, Normal      polyethylene glycol (GLYCOLAX) 17 GM/SCOOP powder Take 17 g by mouth daily, Starting Tue 2/1/2022, Until Wed 6/22/2022, Normal                 PDMP Review     None          ED Provider  Electronically Signed by           Vandy Cowden, MD  02/18/23 1008

## 2023-03-14 NOTE — PROGRESS NOTES
Cardiology Consultation     Neena Marley  [id number]  1995  HEART [facility]ASCULAR [facility] [place] [id number]  1  PVC (premature ventricular contraction)  metoprolol succinate (TOPROL-XL) 25 mg 24 hr tablet      2  Symptomatic PVCs  Ambulatory Referral to Cardiology    metoprolol succinate (TOPROL-XL) 25 mg 24 hr tablet        Patient Active Problem List   Diagnosis   • Anxiety   • Postpartum depression   • Encounter for gynecological examination (general) (routine) without abnormal findings   • Bacterial vaginosis   • Microscopic hematuria   • Nipple problem   • Irregular menses   • Galactorrhea   • Vaginal discharge       HPI patient here for a cardiology evaluation  She went to the ED 2023 in reference to palpitation  This is felt to be related to PVCs  Patient was noted in the ED to have a sensation of extra beat with a PVC noted on the monitor  EKG demonstrated NSR and was a normal tracing  Lab testing was unremarkable except for potassium of 3 6  Troponin and TSH were normal   EKG today demonstrates NSR and is a normal tracing  Patient is here for a second opinion  She had been seen by [facility] cardiology 2023 on 2 occasions  She was told that she had benign PVCs  Echocardiogram done February 3, 2023 demonstrated preserved LV systolic function and no significant valve disease  Holter monitor done February 3, 2023 demonstrated low density of PVCs and PACs  Predominant rhythm was NSR with an average heart rate of 91  Patient continues to have intermittent episodes of an extra beat  She is bothered by them  She does not drink caffeinated beverages as caffeine makes her jittery  In reference to family history her maternal grandmother had stents placed  Her paternal grandfather had a heart attack in his 45s but  at the age of [de-identified]      PMH-  Past Medical History: Diagnosis Date   • Anxiety     last assessed - [id number]   • Chest pain     last assessed - [id number]   • Continuous RUQ abdominal pain     last assessed - 13YFQ9691   • External hemorrhoid     last assessed - [id number]   • Finger injury     last assessed - 61Hhc0969   • Varicella     childhood   • Yeast vaginitis     last assessed - 62Ujv1660        SOCIAL HISTORY-  Social History     Socioeconomic History   • Marital status: Single     Spouse name: Not on file   • Number of children: Not on file   • Years of education: Not on file   • Highest education level: Not on file   Occupational History   • Not on file   Tobacco Use   • Smoking status: Every Day     Packs/day: 0 50     Types: Cigarettes     Last attempt to quit: 10/1/2019     Years since quitting: 3 4   • Smokeless tobacco: Never   Vaping Use   • Vaping Use: Never used   Substance and Sexual Activity   • Alcohol use: Not Currently     Comment: social    • Drug use: No   • Sexual activity: Yes     Partners: Male     Birth control/protection: Patch   Other Topics Concern   • Not on file   Social History Narrative   • Not on file     Social Determinants of Health     Financial Resource Strain: Not on file   Food Insecurity: Not on file   Transportation Needs: Not on file   Physical Activity: Not on file   Stress: Not on file   Social Connections: Not on file   Intimate Partner Violence: Not on file   Housing Stability: Not on file        FAMILY HISTORY-  Family History   Problem Relation Age of Onset   • No Known Problems Mother    • No Known Problems Father    • No Known Problems Sister    • No Known Problems Maternal Grandmother    • No Known Problems Maternal Grandfather    • No Known Problems Paternal Grandmother    • Liver cancer Paternal Grandfather    • No Known Problems Daughter    • No Known Problems Maternal Aunt    • No Known Problems Maternal Aunt    • No Known Problems Paternal Aunt    • No Known Problems Paternal Aunt    • No Known Problems Paternal Aunt    • Colon cancer Cousin    • Diabetes Other        SURGICAL HISTORY-  Past Surgical History:   Procedure Laterality Date   • COLONOSCOPY     • DENTAL SURGERY     • INDUCED      • WISDOM TOOTH EXTRACTION           Current Outpatient Medications:   •  metoprolol succinate (TOPROL-XL) 25 mg 24 hr tablet, Take 0 5 tablets (12 5 mg total) by mouth if needed (take for palpitations), Disp: 30 tablet, Rfl: 3  •  MULTIPLE VITAMIN PO, Take by mouth, Disp: , Rfl:   •  famotidine (PEPCID) 20 mg tablet, TAKE 1 TABLET (20 MG TOTAL) BY MOUTH IN THE MORNING AND 1 TABLET (20 MG TOTAL) IN THE EVENING  (Patient not taking: Reported on 3/24/2023), Disp: 180 tablet, Rfl: 1  •  Junel FE 1/20 1-20 MG-MCG per tablet, TAKE 1 TABLET BY MOUTH EVERY DAY (Patient not taking: Reported on 3/24/2023), Disp: 84 tablet, Rfl: 3  •  LORazepam (Ativan) 0 5 mg tablet, Take 1 tablet (0 5 mg total) by mouth every 8 (eight) hours as needed for anxiety for up to 10 days, Disp: 8 tablet, Rfl: 0  •  omeprazole (PriLOSEC) 20 mg delayed release capsule, Take 1 capsule (20 mg total) by mouth daily (Patient not taking: Reported on 2022), Disp: 30 capsule, Rfl: 0  •  polyethylene glycol (GLYCOLAX) 17 GM/SCOOP powder, Take 17 g by mouth daily (Patient not taking: Reported on 3/24/2023), Disp: 510 g, Rfl: 0  No Known Allergies  Vitals:    23 1016   BP: 98/66   BP Location: Left arm   Patient Position: Sitting   Cuff Size: Standard   Pulse: 80   SpO2: 98%   Weight: 68 2 kg (150 lb 6 4 oz)         Review of Systems:  Review of Systems   All other systems reviewed and are negative  Physical Exam:  Physical Exam  Vitals reviewed  Constitutional:       Appearance: She is well-developed  HENT:      Head: Normocephalic and atraumatic  Eyes:      Conjunctiva/sclera: Conjunctivae normal       Pupils: Pupils are equal, round, and reactive to light  Cardiovascular:      Rate and Rhythm: Normal rate        Heart sounds: Normal heart sounds  Pulmonary:      Effort: Pulmonary effort is normal       Breath sounds: Normal breath sounds  Musculoskeletal:      Cervical back: Normal range of motion and neck supple  Skin:     General: Skin is warm and dry  Neurological:      Mental Status: She is alert and oriented to person, place, and time  Discussion/Summary: Patient continues to have sensation of extra heartbeat likely related to PVCs or PACs  Have given her low-dose metoprolol succinate 12 5 mg to take as needed  If she derives benefit from this could consider taking 12 5 mg once a day  I do not think she needs further cardiac testing  I have asked her to call if there is a problem in the interim otherwise I will see her in 6 months

## 2023-03-24 ENCOUNTER — CONSULT (OUTPATIENT)
Dept: CARDIOLOGY CLINIC | Facility: CLINIC | Age: 28
End: 2023-03-24

## 2023-03-24 VITALS
WEIGHT: 150.4 LBS | DIASTOLIC BLOOD PRESSURE: 66 MMHG | OXYGEN SATURATION: 98 % | BODY MASS INDEX: 26.64 KG/M2 | HEART RATE: 80 BPM | SYSTOLIC BLOOD PRESSURE: 98 MMHG

## 2023-03-24 DIAGNOSIS — I49.3 PVC (PREMATURE VENTRICULAR CONTRACTION): Primary | ICD-10-CM

## 2023-03-24 DIAGNOSIS — I49.3 SYMPTOMATIC PVCS: ICD-10-CM

## 2023-03-24 RX ORDER — METOPROLOL SUCCINATE 25 MG/1
12.5 TABLET, EXTENDED RELEASE ORAL AS NEEDED
Qty: 30 TABLET | Refills: 3 | Status: SHIPPED | OUTPATIENT
Start: 2023-03-24

## 2023-03-24 NOTE — PATIENT INSTRUCTIONS
Please take metoprolol succinate 12 5 mg as needed  This is half a tablet  Call if there is a problem  I will see you in follow-up

## 2023-07-13 ENCOUNTER — TELEPHONE (OUTPATIENT)
Dept: CARDIOLOGY CLINIC | Facility: CLINIC | Age: 28
End: 2023-07-13

## 2023-07-13 DIAGNOSIS — R10.11 RIGHT UPPER QUADRANT ABDOMINAL PAIN: Primary | ICD-10-CM

## 2023-07-13 RX ORDER — FAMOTIDINE 20 MG/1
20 TABLET, FILM COATED ORAL 2 TIMES DAILY
Qty: 180 TABLET | Refills: 0 | Status: SHIPPED | OUTPATIENT
Start: 2023-07-13

## 2023-07-13 NOTE — TELEPHONE ENCOUNTER
----- Message from Chris Hendrickson MD sent at 7/13/2023 11:31 AM EDT -----  Regarding: FW: Medication  Contact: [phone]  Please call her. Would recommend she start taking it every morning. If that is not sufficient to control her symptoms she can take it twice a day. She should call back if there is any ongoing issue. Thank you.      ----- Message -----  From: Marlyn Karimi  Sent: 7/13/2023  11:25 AM EDT  To: Chris Hendrickson MD  Subject: FW: Medication                                   In regards to taking the metoprolol succinate 12.5mg as needed. ----- Message -----  From: Theresa Gutierrez  Sent: 7/13/2023  10:41 AM EDT  To: Card[facility]  Subject: Medication                                       Hi I have not used this medication yet however I've been feeling the abnormal heart beat too often lately, is this still something I should try taking?

## 2023-07-26 ENCOUNTER — TELEPHONE (OUTPATIENT)
Dept: OBGYN CLINIC | Facility: CLINIC | Age: 28
End: 2023-07-26

## 2024-02-21 PROBLEM — Z01.419 ENCOUNTER FOR GYNECOLOGICAL EXAMINATION (GENERAL) (ROUTINE) WITHOUT ABNORMAL FINDINGS: Status: RESOLVED | Noted: 2019-09-25 | Resolved: 2024-02-21

## 2024-04-08 NOTE — PROGRESS NOTES
Assessment/Plan   Diagnoses and all orders for this visit:    Encounter for gynecological examination (general) (routine) without abnormal findings    The current ASCCP guidelines were reviewed. Patient's last pap was 6/22/22 - WNL and therefore, a pap with HPV cotesting is not indicated at this time. I emphasized the importance of an annual pelvic and breast exam. Patient ok to defer pap today - will be due for repeat screening next year.    Discussion  I have discussed the importance of monthly self-breast exams, exercise and healthy diet as well as adequate intake of calcium and vitamin D. Encourage MVI q day and r/ancelmo importance of folic acid; Encourage 30-40 min weight bearing exercise most days of week  Encourage safe sexual practices; STI testing - declines  Contraception - condoms working well  Breast cancer screening is not indicated at this time  The patient has had the Gardasil vaccine series, which is recommended for patients from 9-45 years of age.   All questions have been answered to her satisfaction  RTO for APE or sooner if needed    Subjective     HPI   Yulisa Mast is a 28 y.o. female who presents for annual well woman exam.   Menarche - 10; LMP - 4/3/24; Periods are reg q month and last 5 days; Heavy flow the first 2 days; No excessive bleeding; No intermenstrual bleeding or spotting; Cramps are tolerable.  No vulvar itch/burn; No vaginal itch/burn; No abn discharge or odor; No urinary sx - burning/pain/frequency/hematuria  (+) SBEs - no breast masses, asymmetry, nipple discharge or bleeding, changes in skin of breast, or breast tenderness bilaterally  No abd/pelvic pain or HAs;   Pt is sexually active in a mutually monog/ sexual relationship; No issues with intercourse; She declines sti/hiv/hep testing; Feels safe at home  Current contraception: condoms working well  Gardasil - believes completed  (+) PCP for routine Bw/care;    Last Pap - 6/22/22 - WNL  History of abnormal Pap  smear: no    Review of Systems   Constitutional:  Negative for activity change, fatigue, fever and unexpected weight change.   HENT:  Negative for congestion, dental problem, sinus pressure and sinus pain.    Eyes:  Negative for visual disturbance.   Respiratory:  Negative for cough, shortness of breath and wheezing.    Cardiovascular:  Negative for chest pain and leg swelling.   Gastrointestinal:  Negative for abdominal distention, abdominal pain, blood in stool, constipation, diarrhea, nausea and vomiting.   Endocrine: Negative for polydipsia.   Genitourinary:  Negative for difficulty urinating, dyspareunia, dysuria, frequency, hematuria, menstrual problem, pelvic pain, urgency, vaginal bleeding, vaginal discharge and vaginal pain.   Musculoskeletal:  Negative for arthralgias and back pain.   Allergic/Immunologic: Negative for environmental allergies.   Neurological:  Negative for dizziness, seizures and headaches.   Psychiatric/Behavioral:  Negative for dysphoric mood and sleep disturbance. The patient is not nervous/anxious.        The following portions of the patient's history were reviewed and updated as appropriate: allergies, current medications, past family history, past medical history, past social history, past surgical history, and problem list.         OB History          3    Para   2    Term   2            AB   1    Living   2         SAB        IAB        Ectopic        Multiple   0    Live Births   2           Obstetric Comments   : 2013  M; 2018  F; 1 VIP - surgical  Age of menarche 10  Age at first live birth 18               Past Medical History:   Diagnosis Date    Anxiety     last assessed - 2018    Chest pain     last assessed - 2013    Continuous RUQ abdominal pain     last assessed - 2018    External hemorrhoid     last assessed - 2018    Finger injury     last assessed - [id number]    Varicella     childhood    Yeast vaginitis     last assessed -  [id number]       Past Surgical History:   Procedure Laterality Date    COLONOSCOPY      DENTAL SURGERY      INDUCED       WISDOM TOOTH EXTRACTION         Family History   Problem Relation Age of Onset    No Known Problems Mother     No Known Problems Father     No Known Problems Sister     No Known Problems Maternal Grandmother     No Known Problems Maternal Grandfather     No Known Problems Paternal Grandmother     Liver cancer Paternal Grandfather     No Known Problems Daughter     No Known Problems Maternal Aunt     No Known Problems Maternal Aunt     No Known Problems Paternal Aunt     No Known Problems Paternal Aunt     No Known Problems Paternal Aunt     Colon cancer Cousin     Diabetes Other        Social History     Socioeconomic History    Marital status: Single     Spouse name: Not on file    Number of children: Not on file    Years of education: Not on file    Highest education level: Not on file   Occupational History    Not on file   Tobacco Use    Smoking status: Some Days     Current packs/day: 0.00     Average packs/day: 0.3 packs/day for 15.0 years (3.8 ttl pk-yrs)     Types: Cigarettes     Last attempt to quit: 10/1/2019     Years since quittin.5    Smokeless tobacco: Never   Vaping Use    Vaping status: Never Used   Substance and Sexual Activity    Alcohol use: Yes     Comment: social     Drug use: No    Sexual activity: Yes     Partners: Male     Birth control/protection: None, Condom Male   Other Topics Concern    Not on file   Social History Narrative    Not on file     Social Determinants of Health     Financial Resource Strain: High Risk (2023)    Received from [facility]    Overall Financial Resource Strain (CARDIA)     Difficulty of Paying Living Expenses: Hard   Food Insecurity: No Food Insecurity (2023)    Received from [facility]    Hunger Vital Sign     Worried About Running Out of Food in the Last Year: Never true     Ran Out of  Food in the Last Year: Never true   Transportation Needs: No Transportation Needs (6/13/2023)    Received from [facility]    PRAPARE - Transportation     Lack of Transportation (Medical): No     Lack of Transportation (Non-Medical): No   Physical Activity: Not on file   Stress: Stress Concern Present (6/13/2023)    Received from [facility]    [employer] - Occupational Stress Questionnaire     Feeling of Stress : Rather much   Social Connections: Socially Integrated (6/13/2023)    Received from [facility]    Social Connection and Isolation Panel [NHANES]     Frequency of Communication with Friends and Family: More than three times a week     Frequency of Social Gatherings with Friends and Family: Once a week     Attends Sikh Services: More than 4 times per year     Active Member of Clubs or Organizations: Yes     Attends Club or Organization Meetings: More than 4 times per year     Marital Status: Living with partner   Intimate Partner Violence: Not At Risk (6/13/2023)    Received from [facility]    Humiliation, Afraid, Rape, and Kick questionnaire     Fear of Current or Ex-Partner: No     Emotionally Abused: No     Physically Abused: No     Sexually Abused: No   Housing Stability: Low Risk  (6/13/2023)    Received from [facility]    Housing Stability Vital Sign     Unable to Pay for Housing in the Last Year: No     Number of Places Lived in the Last Year: 1     Unstable Housing in the Last Year: No         Current Outpatient Medications:     MULTIPLE VITAMIN PO, Take by mouth, Disp: , Rfl:     LORazepam (Ativan) 0.5 mg tablet, Take 1 tablet (0.5 mg total) by mouth every 8 (eight) hours as needed for anxiety for up to 10 days, Disp: 8 tablet, Rfl: 0    No Known Allergies    Objective   Vitals:    04/09/24 1001   BP: 102/66   BP Location: Left arm   Patient Position: Sitting   Cuff Size:  "Standard   Weight: 70.3 kg (155 lb)   Height: 5' 3\" (1.6 m)     Physical Exam  Vitals reviewed.   Constitutional:       General: She is awake. She is not in acute distress.     Appearance: Normal appearance. She is well-developed and well-groomed. She is not ill-appearing, toxic-appearing or diaphoretic.   HENT:      Head: Normocephalic and atraumatic.   Eyes:      Conjunctiva/sclera: Conjunctivae normal.   Neck:      Thyroid: No thyroid mass, thyromegaly or thyroid tenderness.   Cardiovascular:      Rate and Rhythm: Normal rate and regular rhythm.      Heart sounds: Normal heart sounds. No murmur heard.  Pulmonary:      Effort: Pulmonary effort is normal. No tachypnea, bradypnea or respiratory distress.      Breath sounds: Normal breath sounds. No stridor or decreased air movement. No wheezing.   Chest:   Breasts:     Breasts are symmetrical.      Right: Normal. No swelling, bleeding, inverted nipple, mass, nipple discharge, skin change or tenderness.      Left: Normal. No swelling, bleeding, inverted nipple, mass, nipple discharge, skin change or tenderness.   Abdominal:      General: There is no distension.      Palpations: Abdomen is soft. There is no hepatomegaly, splenomegaly or mass.      Tenderness: There is no abdominal tenderness.      Hernia: No hernia is present. There is no hernia in the left inguinal area or right inguinal area.   Genitourinary:     General: Normal vulva.      Exam position: Supine.      Pubic Area: No rash or pubic lice.       Labia:         Right: No rash, tenderness, lesion or injury.         Left: No rash, tenderness, lesion or injury.       Urethra: No prolapse, urethral pain, urethral swelling or urethral lesion.      Vagina: Normal. No signs of injury and foreign body. No vaginal discharge, erythema, tenderness, bleeding, lesions or prolapsed vaginal walls.      Cervix: No cervical motion tenderness, discharge, friability, lesion, erythema or cervical bleeding.      Uterus: Not " deviated, not enlarged, not fixed, not tender and no uterine prolapse.       Adnexa:         Right: No mass, tenderness or fullness.          Left: No mass, tenderness or fullness.     Lymphadenopathy:      Cervical: No cervical adenopathy.      Upper Body:      Right upper body: No supraclavicular or axillary adenopathy.      Left upper body: No supraclavicular or axillary adenopathy.      Lower Body: No right inguinal adenopathy. No left inguinal adenopathy.   Skin:     General: Skin is warm and dry.   Neurological:      Mental Status: She is alert and oriented to person, place, and time.   Psychiatric:         Mood and Affect: Mood and affect normal.         Speech: Speech normal.         Behavior: Behavior normal. Behavior is cooperative.         Thought Content: Thought content normal.         Judgment: Judgment normal.

## 2024-04-09 ENCOUNTER — ANNUAL EXAM (OUTPATIENT)
Dept: OBGYN CLINIC | Facility: CLINIC | Age: 29
End: 2024-04-09
Payer: COMMERCIAL

## 2024-04-09 VITALS
SYSTOLIC BLOOD PRESSURE: 102 MMHG | HEIGHT: 63 IN | BODY MASS INDEX: 27.46 KG/M2 | WEIGHT: 155 LBS | DIASTOLIC BLOOD PRESSURE: 66 MMHG

## 2024-04-09 DIAGNOSIS — Z01.419 ENCOUNTER FOR GYNECOLOGICAL EXAMINATION (GENERAL) (ROUTINE) WITHOUT ABNORMAL FINDINGS: Primary | ICD-10-CM

## 2024-04-09 PROCEDURE — S0612 ANNUAL GYNECOLOGICAL EXAMINA: HCPCS | Performed by: PHYSICIAN ASSISTANT

## 2024-07-03 DIAGNOSIS — Z00.6 ENCOUNTER FOR EXAMINATION FOR NORMAL COMPARISON OR CONTROL IN CLINICAL RESEARCH PROGRAM: ICD-10-CM

## 2025-02-27 ENCOUNTER — TELEPHONE (OUTPATIENT)
Dept: OBGYN CLINIC | Facility: CLINIC | Age: 30
End: 2025-02-27

## 2025-04-28 ENCOUNTER — TELEPHONE (OUTPATIENT)
Dept: OBGYN CLINIC | Facility: CLINIC | Age: 30
End: 2025-04-28

## 2025-05-05 ENCOUNTER — TELEPHONE (OUTPATIENT)
Dept: OBGYN CLINIC | Facility: CLINIC | Age: 30
End: 2025-05-05

## 2025-05-05 NOTE — PROGRESS NOTES
Assessment & Plan  Women's annual routine gynecological examination         Cervical cancer screening    Orders:    Thinprep Tis and HPV mRNA E6/E7    HPV vaccine counseling           ASSESSMENT & PLAN: Yulisa is a 30 y.o.  with normal gynecologic exam.    1.  Routine well woman exam done today.  2.  Cervical Cancer Screening: Pap was done today.  Current ASCCP Guidelines reviewed.   3.  Yulisa declined STD testing in a mutually monogamous relationship.   4.  Gardasil is recommended for patients from 9-45 years of age. Yulisa has not had the Gardasil vaccine series. She is not interested in vaccination, information given.   5. She declined contraception. Pregnancy welcomed, advised to initiate a prenatal vitamin with folic acid to reduce the risk of neural tube defects.   6. I have discussed the importance of monthly self-breast exams, exercise a minimum of 150 minutes each week including weight bearing exercises while maintaining a healthy diet including adequate intake of Calcium and Vitamin D.   7. Return to office in 12 months for annual exam.   8. Call your insurance company to verify coverage prior to completing any ordered tests.     Subjective     HPI   Yulisa Mast is a 30 y.o.  female who presents for annual well woman exam.     Menses are regular, monthly lasting four days. denies intermenstrual bleeding, or spotting.   denies vaginal discharge, labial erythema or lesions.   denies vaginal itching, irritation and dryness  Contraception: pregnancy welcomed .  Patient is sexually active with male partner/ .  Monogamous and feels safe in this relationship. Denies problems with intercourse.   She has no urinary concerns, does not have incontinence. denies breast concerns.   denies gynecologic surgeries.  Patient does have a family history of breast, endometrial, colon, or ovarian ca. Cancer-related family history includes Colon cancer in her cousin; Liver cancer in her paternal  grandfather. Maternal grandmother recently dx with breast cancer in her 70's.     OB:  OB History    Para Term  AB Living   3 2 2  1 2   SAB IAB Ectopic Multiple Live Births      0 2      # Outcome Date GA Lbr Jeff/2nd Weight Sex Type Anes PTL Lv   3 Term 02/10/18 40w3d / 00:06 3005 g (6 lb 10 oz) F Vag-Spont EPI N JG   2 Term 13 40w0d   M Vag-Spont EPI  JG   1 AB               Birth Comments: surgical      Obstetric Comments   : 2013  M; 2018  F; 1 VIP - surgical   Age of menarche 10   Age at first live birth 18       Health Maintenance:    Active lifestyle   She does follow a well balanced diet.    She does follow with a PCP.    Screening:  Cervical cancer: last pap smear in 2022. Results were NILM. Patient has not received Gardasil vaccine.   History of abnormal pap smears:   Breast cancer: Will begin at age 40 per ACOG guidelines.   Colon cancer: Will begin at age 45.   STD screening: declined.    Social History:  Social History     Socioeconomic History    Marital status: Single     Spouse name: Not on file    Number of children: Not on file    Years of education: Not on file    Highest education level: Not on file   Occupational History    Not on file   Tobacco Use    Smoking status: Some Days     Current packs/day: 0.00     Average packs/day: 0.3 packs/day for 15.0 years (3.8 ttl pk-yrs)     Types: Cigarettes     Last attempt to quit: 10/1/2019     Years since quittin.6    Smokeless tobacco: Never   Vaping Use    Vaping status: Never Used   Substance and Sexual Activity    Alcohol use: Yes     Comment: social     Drug use: No    Sexual activity: Yes     Partners: Male     Birth control/protection: None, Condom Male   Other Topics Concern    Not on file   Social History Narrative    Not on file     Social Drivers of Health     Financial Resource Strain: Low Risk  (2024)    Received from [facility]    Overall Financial Resource Strain (CARDIA)      Difficulty of Paying Living Expenses: Not hard at all   Food Insecurity: No Food Insecurity (2024)    Received from [facility]    Hunger Vital Sign     Worried About Running Out of Food in the Last Year: Never true     Ran Out of Food in the Last Year: Never true   Transportation Needs: No Transportation Needs (2024)    Received from [facility]    PRAPARE - Transportation     Lack of Transportation (Medical): No     Lack of Transportation (Non-Medical): No   Physical Activity: Not on file   Stress: Stress Concern Present (2023)    Received from [facility]    [employer] - Occupational Stress Questionnaire     Feeling of Stress : Rather much   Social Connections: Feeling Socially Integrated (2024)    Received from [facility]    [place] : Social Isolation     How often do you feel lonely or isolated from those around you?: Never   Intimate Partner Violence: Not At Risk (2024)    Received from [facility]    Humiliation, Afraid, Rape, and Kick questionnaire     Fear of Current or Ex-Partner: No     Emotionally Abused: No     Physically Abused: No     Sexually Abused: No   Housing Stability: Low Risk  (2024)    Received from [facility]    Housing Stability Vital Sign     Unable to Pay for Housing in the Last Year: No     Number of Times Moved in the Last Year: 0     Homeless in the Last Year: No       Social History     Tobacco Use    Smoking status: Some Days     Current packs/day: 0.00     Average packs/day: 0.3 packs/day for 15.0 years (3.8 ttl pk-yrs)     Types: Cigarettes     Last attempt to quit: 10/1/2019     Years since quittin.6    Smokeless tobacco: Never   Vaping Use    Vaping status: Never Used   Substance Use Topics    Alcohol use: Yes     Comment: social     Drug use: No       Review of Systems   Constitutional: Negative.  Negative for  activity change, appetite change and fever.   Respiratory:  Negative for shortness of breath.    Cardiovascular:  Negative for chest pain.   Gastrointestinal:  Negative for abdominal pain, constipation, diarrhea and vomiting.   Genitourinary:  Negative for dyspareunia, dysuria, frequency, menstrual problem, pelvic pain, vaginal bleeding, vaginal discharge and vaginal pain.   Skin:  Negative for color change.   Psychiatric/Behavioral: Negative.     All other systems reviewed and are negative.      The following portions of the patient's history were reviewed and updated as appropriate: allergies, current medications, past family history, past medical history, past social history, past surgical history, and problem list.         OB History          3    Para   2    Term   2            AB   1    Living   2         SAB        IAB        Ectopic        Multiple   0    Live Births   2           Obstetric Comments   : 2013  M; 2018  F; 1 VIP - surgical  Age of menarche 10  Age at first live birth 18               Past Medical History:   Diagnosis Date    Anxiety     last assessed - 2018    Chest pain     last assessed - [id number]    Continuous RUQ abdominal pain     last assessed - 2018    External hemorrhoid     last assessed - 2018    Finger injury     last assessed - [id number]    Varicella     childhood    Yeast vaginitis     last assessed - [id number]       Past Surgical History:   Procedure Laterality Date    COLONOSCOPY      DENTAL SURGERY      INDUCED       WISDOM TOOTH EXTRACTION         Family History   Problem Relation Age of Onset    No Known Problems Mother     No Known Problems Father     No Known Problems Sister     No Known Problems Maternal Grandmother     No Known Problems Maternal Grandfather     No Known Problems Paternal Grandmother     Liver cancer Paternal Grandfather     No Known Problems Daughter     No Known Problems Maternal Aunt     No Known Problems  Maternal Aunt     No Known Problems Paternal Aunt     No Known Problems Paternal Aunt     No Known Problems Paternal Aunt     Colon cancer Cousin     Diabetes Other          Current Outpatient Medications:     MULTIPLE VITAMIN PO, Take by mouth, Disp: , Rfl:     LORazepam (Ativan) 0.5 mg tablet, Take 1 tablet (0.5 mg total) by mouth every 8 (eight) hours as needed for anxiety for up to 10 days, Disp: 8 tablet, Rfl: 0    No Known Allergies    Objective   Vitals:    05/07/25 1048   BP: 100/64   BP Location: Left arm   Patient Position: Sitting   Cuff Size: Standard   Weight: 72.4 kg (159 lb 9.6 oz)     Physical Exam  Vitals and nursing note reviewed.   Constitutional:       General: She is not in acute distress.     Appearance: Normal appearance. She is not ill-appearing.   HENT:      Head: Normocephalic.   Neck:      Thyroid: No thyromegaly or thyroid tenderness.   Cardiovascular:      Rate and Rhythm: Normal rate and regular rhythm.      Heart sounds: Normal heart sounds.   Pulmonary:      Effort: Pulmonary effort is normal. No respiratory distress.      Breath sounds: Normal breath sounds.   Chest:   Breasts:     Right: Normal. No inverted nipple, nipple discharge, skin change or tenderness.      Left: Normal. No inverted nipple, nipple discharge, skin change or tenderness.   Abdominal:      General: Abdomen is flat.      Palpations: Abdomen is soft.      Tenderness: There is no abdominal tenderness. There is no guarding.   Genitourinary:     General: Normal vulva.      Exam position: Lithotomy position.      Labia:         Right: No rash, tenderness or lesion.         Left: No rash, tenderness or lesion.       Urethra: No prolapse.      Vagina: Normal. No vaginal discharge, erythema, tenderness or lesions.      Cervix: Normal. No cervical motion tenderness, discharge or lesion.      Uterus: Not tender and no uterine prolapse.       Adnexa:         Right: No tenderness.          Left: No tenderness.      Musculoskeletal:      Cervical back: Neck supple.   Lymphadenopathy:      Cervical: No cervical adenopathy.   Skin:     General: Skin is warm and dry.      Capillary Refill: Capillary refill takes less than 2 seconds.   Neurological:      General: No focal deficit present.      Mental Status: She is alert and oriented to person, place, and time.   Psychiatric:         Mood and Affect: Mood normal.         Behavior: Behavior normal.         Thought Content: Thought content normal.       Jen BATISTA  OB/GYN  5/7/2025  11:49 AM

## 2025-05-07 ENCOUNTER — ANNUAL EXAM (OUTPATIENT)
Dept: OBGYN CLINIC | Facility: CLINIC | Age: 30
End: 2025-05-07

## 2025-05-07 VITALS — BODY MASS INDEX: 28.27 KG/M2 | WEIGHT: 159.6 LBS | SYSTOLIC BLOOD PRESSURE: 100 MMHG | DIASTOLIC BLOOD PRESSURE: 64 MMHG

## 2025-05-07 DIAGNOSIS — Z71.85 HPV VACCINE COUNSELING: ICD-10-CM

## 2025-05-07 DIAGNOSIS — Z12.4 CERVICAL CANCER SCREENING: ICD-10-CM

## 2025-05-07 DIAGNOSIS — Z01.419 WOMEN'S ANNUAL ROUTINE GYNECOLOGICAL EXAMINATION: Primary | ICD-10-CM

## 2025-05-13 ENCOUNTER — RESULTS FOLLOW-UP (OUTPATIENT)
Dept: OBGYN CLINIC | Facility: CLINIC | Age: 30
End: 2025-05-13

## 2025-05-13 LAB
CLINICAL INFO: NORMAL
CYTO CVX: NORMAL
CYTOLOGY CMNT CVX/VAG CYTO-IMP: NORMAL
DATE PREVIOUS BX: NORMAL
HPV E6+E7 MRNA CVX QL NAA+PROBE: NOT DETECTED
LMP START DATE: NORMAL
SL AMB PREV. PAP:: NORMAL
SPECIMEN SOURCE CVX/VAG CYTO: NORMAL